# Patient Record
Sex: MALE | Race: WHITE | NOT HISPANIC OR LATINO | Employment: OTHER | URBAN - METROPOLITAN AREA
[De-identification: names, ages, dates, MRNs, and addresses within clinical notes are randomized per-mention and may not be internally consistent; named-entity substitution may affect disease eponyms.]

---

## 2019-08-15 ENCOUNTER — OFFICE VISIT (OUTPATIENT)
Dept: GASTROENTEROLOGY | Facility: CLINIC | Age: 69
End: 2019-08-15
Payer: OTHER GOVERNMENT

## 2019-08-15 ENCOUNTER — PREP FOR PROCEDURE (OUTPATIENT)
Dept: GASTROENTEROLOGY | Facility: CLINIC | Age: 69
End: 2019-08-15

## 2019-08-15 VITALS
SYSTOLIC BLOOD PRESSURE: 132 MMHG | WEIGHT: 219.6 LBS | BODY MASS INDEX: 34.47 KG/M2 | DIASTOLIC BLOOD PRESSURE: 86 MMHG | HEIGHT: 67 IN | HEART RATE: 91 BPM

## 2019-08-15 DIAGNOSIS — K21.9 GASTROESOPHAGEAL REFLUX DISEASE, ESOPHAGITIS PRESENCE NOT SPECIFIED: ICD-10-CM

## 2019-08-15 DIAGNOSIS — R19.5 OCCULT BLOOD IN STOOLS: Primary | ICD-10-CM

## 2019-08-15 PROCEDURE — 99203 OFFICE O/P NEW LOW 30 MIN: CPT | Performed by: PHYSICIAN ASSISTANT

## 2019-08-15 RX ORDER — AMLODIPINE BESYLATE AND BENAZEPRIL HYDROCHLORIDE 2.5; 1 MG/1; MG/1
1 CAPSULE ORAL DAILY
COMMUNITY

## 2019-08-15 RX ORDER — FLUOXETINE HYDROCHLORIDE 20 MG/5ML
LIQUID ORAL DAILY
COMMUNITY

## 2019-08-15 RX ORDER — SIMETHICONE 80 MG
80 TABLET,CHEWABLE ORAL EVERY 6 HOURS PRN
COMMUNITY

## 2019-08-15 RX ORDER — OMEPRAZOLE 40 MG/1
40 CAPSULE, DELAYED RELEASE ORAL DAILY
COMMUNITY

## 2019-08-15 RX ORDER — AMOXICILLIN 500 MG/1
CAPSULE ORAL
Refills: 0 | COMMUNITY
Start: 2019-08-13 | End: 2019-08-27

## 2019-08-15 NOTE — PROGRESS NOTES
[facility] - Outpatient Consultation  Debora Hernandez 71 y o  male MRN: [id number]  Encounter: [id number]          ASSESSMENT AND PLAN:      1  Occult blood in stools  Will plan EGD and colonoscopy for further investigation  He does suffer from acid reflux for which she takes omeprazole 40 mg daily with reasonable relief  He has never had either test in the past   ______________________________________________________________________    HPI:  31-year-old male presents for evaluation of heme-positive stools  He denies any overt rectal bleeding  He denies any abdominal pain, diarrhea, constipation or unexpected weight loss  He has never had an EGD or colonoscopy  He also reports that he suffers from acid reflux for which he takes omeprazole 40 mg daily with some relief in symptoms  He admits that he still occasionally gets heartburn symptoms  REVIEW OF SYSTEMS:    CONSTITUTIONAL: Denies any fever, chills, rigors, and weight loss  HEENT: No earache or tinnitus  Denies hearing loss or visual disturbances  CARDIOVASCULAR: No chest pain or palpitations  RESPIRATORY: Denies any cough, hemoptysis, shortness of breath or dyspnea on exertion  GASTROINTESTINAL: As noted in the History of Present Illness  GENITOURINARY: No problems with urination  Denies any hematuria or dysuria  NEUROLOGIC: No dizziness or vertigo, denies headaches  MUSCULOSKELETAL: Denies any muscle or joint pain  SKIN: Denies skin rashes or itching  ENDOCRINE: Denies excessive thirst  Denies intolerance to heat or cold  PSYCHOSOCIAL: Denies depression or anxiety  Denies any recent memory loss         Historical Information   Past Medical History:   Diagnosis Date    Hypertension      Past Surgical History:   Procedure Laterality Date    KNEE SURGERY       Social History   Social History     Substance and Sexual Activity   Alcohol Use Not Currently     Social History     Substance and Sexual Activity Drug Use Never     Social History     Tobacco Use   Smoking Status Former Smoker   Smokeless Tobacco Never Used     Family History   Problem Relation Age of Onset    Heart disease Mother     Heart disease Father        Meds/Allergies       Current Outpatient Medications:     amLODIPine-benazepril (LOTREL 2 5-10) 2 5-10 MG per capsule    FLUoxetine (PROzac) 20 mg/5 mL solution    omeprazole (PriLOSEC) 40 MG capsule    simethicone (MYLICON) 80 mg chewable tablet    amoxicillin (AMOXIL) 500 mg capsule    No Known Allergies        Objective     Blood pressure 132/86, pulse 91, height 5' 7" (1 702 m), weight 99 6 kg (219 lb 9 6 oz)  Body mass index is 34 39 kg/m²  PHYSICAL EXAM:      General Appearance:   Alert, cooperative, no distress   HEENT:   Normocephalic, atraumatic, anicteric      Neck:  Supple, symmetrical, trachea midline   Lungs:   Clear to auscultation bilaterally; no rales, rhonchi or wheezing; respirations unlabored    Heart[de-identified]   Regular rate and rhythm; no murmur, rub, or gallop  Abdomen:   Soft, non-tender, non-distended; normal bowel sounds; no masses, no organomegaly    Genitalia:   Deferred    Rectal:   Deferred    Extremities:  No cyanosis, clubbing or edema    Pulses:  2+ and symmetric    Skin:  No jaundice, rashes, or lesions    Lymph nodes:  No palpable cervical lymphadenopathy        Lab Results:   No visits with results within 1 Day(s) from this visit  Latest known visit with results is:   No results found for any previous visit  Radiology Results:   No results found

## 2019-08-26 ENCOUNTER — ANESTHESIA EVENT (OUTPATIENT)
Dept: GASTROENTEROLOGY | Facility: HOSPITAL | Age: 69
End: 2019-08-26

## 2019-08-27 ENCOUNTER — ANESTHESIA (OUTPATIENT)
Dept: GASTROENTEROLOGY | Facility: HOSPITAL | Age: 69
End: 2019-08-27

## 2019-08-27 ENCOUNTER — HOSPITAL ENCOUNTER (OUTPATIENT)
Dept: GASTROENTEROLOGY | Facility: HOSPITAL | Age: 69
Setting detail: OUTPATIENT SURGERY
Discharge: HOME | End: 2019-08-27
Attending: INTERNAL MEDICINE | Admitting: INTERNAL MEDICINE
Payer: OTHER GOVERNMENT

## 2019-08-27 VITALS
OXYGEN SATURATION: 94 % | TEMPERATURE: 97.8 F | DIASTOLIC BLOOD PRESSURE: 78 MMHG | HEART RATE: 72 BPM | RESPIRATION RATE: 18 BRPM | SYSTOLIC BLOOD PRESSURE: 116 MMHG

## 2019-08-27 DIAGNOSIS — R19.5 OCCULT BLOOD IN STOOLS: ICD-10-CM

## 2019-08-27 DIAGNOSIS — K21.9 GASTROESOPHAGEAL REFLUX DISEASE, ESOPHAGITIS PRESENCE NOT SPECIFIED: ICD-10-CM

## 2019-08-27 PROCEDURE — 45381 COLONOSCOPY SUBMUCOUS NJX: CPT | Performed by: INTERNAL MEDICINE

## 2019-08-27 PROCEDURE — 45385 COLONOSCOPY W/LESION REMOVAL: CPT | Performed by: INTERNAL MEDICINE

## 2019-08-27 PROCEDURE — 43235 EGD DIAGNOSTIC BRUSH WASH: CPT | Performed by: INTERNAL MEDICINE

## 2019-08-27 PROCEDURE — 88305 TISSUE EXAM BY PATHOLOGIST: CPT | Performed by: PATHOLOGY

## 2019-08-27 PROCEDURE — NC001 PR NO CHARGE: Performed by: INTERNAL MEDICINE

## 2019-08-27 RX ORDER — PROPOFOL 10 MG/ML
INJECTION, EMULSION INTRAVENOUS AS NEEDED
Status: DISCONTINUED | OUTPATIENT
Start: 2019-08-27 | End: 2019-08-27 | Stop reason: SURG

## 2019-08-27 RX ORDER — LIDOCAINE HYDROCHLORIDE 10 MG/ML
INJECTION, SOLUTION INFILTRATION; PERINEURAL AS NEEDED
Status: DISCONTINUED | OUTPATIENT
Start: 2019-08-27 | End: 2019-08-27 | Stop reason: SURG

## 2019-08-27 RX ORDER — SODIUM CHLORIDE, SODIUM LACTATE, POTASSIUM CHLORIDE, CALCIUM CHLORIDE 600; 310; 30; 20 MG/100ML; MG/100ML; MG/100ML; MG/100ML
125 INJECTION, SOLUTION INTRAVENOUS CONTINUOUS
Status: DISCONTINUED | OUTPATIENT
Start: 2019-08-27 | End: 2019-08-31 | Stop reason: HOSPADM

## 2019-08-27 RX ADMIN — PROPOFOL 50 MG: 10 INJECTION, EMULSION INTRAVENOUS at 12:25

## 2019-08-27 RX ADMIN — PROPOFOL 100 MG: 10 INJECTION, EMULSION INTRAVENOUS at 12:16

## 2019-08-27 RX ADMIN — PROPOFOL 25 MG: 10 INJECTION, EMULSION INTRAVENOUS at 12:33

## 2019-08-27 RX ADMIN — PROPOFOL 50 MG: 10 INJECTION, EMULSION INTRAVENOUS at 12:21

## 2019-08-27 RX ADMIN — PROPOFOL 50 MG: 10 INJECTION, EMULSION INTRAVENOUS at 12:19

## 2019-08-27 RX ADMIN — LIDOCAINE HYDROCHLORIDE 70 MG: 10 INJECTION, SOLUTION INFILTRATION; PERINEURAL at 12:16

## 2019-08-27 RX ADMIN — PROPOFOL 50 MG: 10 INJECTION, EMULSION INTRAVENOUS at 12:29

## 2019-08-27 RX ADMIN — SODIUM CHLORIDE, SODIUM LACTATE, POTASSIUM CHLORIDE, AND CALCIUM CHLORIDE 125 ML/HR: .6; .31; .03; .02 INJECTION, SOLUTION INTRAVENOUS at 11:27

## 2019-08-27 NOTE — ANESTHESIA PREPROCEDURE EVALUATION
Review of Systems/Medical History  Patient summary reviewed        Cardiovascular  Hypertension ,    Pulmonary  Negative pulmonary ROS        GI/Hepatic    GERD ,        Negative  ROS        Endo/Other  Negative endo/other ROS      GYN  Negative gynecology ROS          Hematology  Negative hematology ROS      Musculoskeletal  Negative musculoskeletal ROS        Neurology  Negative neurology ROS      Psychology   Psychiatric history,                   Anesthesia Plan  ASA Score- 2     Anesthesia Type- IV sedation with anesthesia with ASA Monitors  Additional Monitors:   Airway Plan:         Plan Factors-    Induction- intravenous  Postoperative Plan-     Informed Consent- Anesthetic plan and risks discussed with patient  I personally reviewed this patient with the CRNA  Discussed and agreed on the Anesthesia Plan with the CRNA  Charlie Calderon

## 2019-08-27 NOTE — ANESTHESIA POSTPROCEDURE EVALUATION
Post-Op Assessment Note    CV Status:  Stable  Pain Score: 0    Pain management: adequate     Mental Status:  Sleepy   Hydration Status:  Euvolemic   PONV Controlled:  Controlled   Airway Patency:  Patent   Post Op Vitals Reviewed: Yes      Staff: CRNA   Comments: vss sv nonobstructed uneventful          BP 97/52 (08/27/19 1238)    Temp 97 8 °F (36 6 °C) (08/27/19 1238)    Pulse 77 (08/27/19 1238)   Resp 18 (08/27/19 1238)    SpO2 96 % (08/27/19 1238)

## 2019-08-27 NOTE — DISCHARGE INSTRUCTIONS

## 2019-08-27 NOTE — H&P
History and Physical -  [facility]  Haidee Sever 71 y o  male MRN: [id number]      HPI: Haidee Sever is a 71y o  year old male who presents for  Hemoccult-positive stool      REVIEW OF SYSTEMS: Per the HPI, and otherwise unremarkable  Historical Information   Past Medical History:   Diagnosis Date    Hypertension      Past Surgical History:   Procedure Laterality Date    KNEE SURGERY       Social History   Social History     Substance and Sexual Activity   Alcohol Use Not Currently     Social History     Substance and Sexual Activity   Drug Use Never     Social History     Tobacco Use   Smoking Status Former Smoker   Smokeless Tobacco Never Used     Family History   Problem Relation Age of Onset    Heart disease Mother     Heart disease Father        Meds/Allergies       (Not in a hospital admission)    No Known Allergies    Objective     Blood pressure 126/86, pulse 83, temperature 97 6 °F (36 4 °C), temperature source Oral, resp  rate 17, SpO2 94 %  PHYSICAL EXAM    Gen: NAD  CV: RRR  CHEST: Clear  ABD: soft, NT/ND  EXT: no edema      ASSESSMENT/PLAN:  This is a 71y o  year old male here for esophagogastroduodenoscopy, colonoscopy, and he is stable and optimized for his procedure

## 2019-09-12 ENCOUNTER — TELEPHONE (OUTPATIENT)
Dept: GASTROENTEROLOGY | Facility: CLINIC | Age: 69
End: 2019-09-12

## 2020-02-21 ENCOUNTER — TRANSCRIBE ORDERS (OUTPATIENT)
Dept: ADMINISTRATIVE | Facility: HOSPITAL | Age: 70
End: 2020-02-21

## 2020-02-21 DIAGNOSIS — G57.31 LESION OF RIGHT LATERAL POPLITEAL NERVE: Primary | ICD-10-CM

## 2020-02-21 DIAGNOSIS — N50.9 LESION OF SCROTUM: Primary | ICD-10-CM

## 2020-02-24 ENCOUNTER — HOSPITAL ENCOUNTER (OUTPATIENT)
Dept: RADIOLOGY | Facility: MEDICAL CENTER | Age: 70
Discharge: HOME | End: 2020-02-24
Payer: COMMERCIAL

## 2020-02-24 DIAGNOSIS — G57.31 LESION OF RIGHT LATERAL POPLITEAL NERVE: ICD-10-CM

## 2020-02-24 PROCEDURE — 76705 ECHO EXAM OF ABDOMEN: CPT

## 2020-03-16 ENCOUNTER — TELEPHONE (OUTPATIENT)
Dept: SURGERY | Facility: CLINIC | Age: 70
End: 2020-03-16

## 2020-03-16 NOTE — TELEPHONE ENCOUNTER
Called pt to see if he had a referral with him to hand in tomorrow- no answer  Left detailed msg for pt to call back re: [place] Referral

## 2020-03-17 ENCOUNTER — CONSULT (OUTPATIENT)
Dept: SURGERY | Facility: CLINIC | Age: 70
End: 2020-03-17
Payer: COMMERCIAL

## 2020-03-17 VITALS
SYSTOLIC BLOOD PRESSURE: 126 MMHG | WEIGHT: 219.8 LBS | DIASTOLIC BLOOD PRESSURE: 82 MMHG | TEMPERATURE: 97.8 F | HEART RATE: 82 BPM | HEIGHT: 67 IN | BODY MASS INDEX: 34.5 KG/M2

## 2020-03-17 DIAGNOSIS — M79.89 SOFT TISSUE MASS: Primary | ICD-10-CM

## 2020-03-17 PROCEDURE — 99203 OFFICE O/P NEW LOW 30 MIN: CPT | Performed by: SURGERY

## 2020-03-17 RX ORDER — SODIUM CHLORIDE, SODIUM LACTATE, POTASSIUM CHLORIDE, CALCIUM CHLORIDE 600; 310; 30; 20 MG/100ML; MG/100ML; MG/100ML; MG/100ML
125 INJECTION, SOLUTION INTRAVENOUS
Status: CANCELLED | OUTPATIENT
Start: 2020-03-17 | End: 2020-03-18

## 2020-03-17 NOTE — PATIENT INSTRUCTIONS
Deep Vein Thrombosis Prevention   WHAT YOU NEED TO KNOW:   Deep vein thrombosis (DVT) is a blood clot that forms in a deep vein of the body  The deep veins in the legs, thighs, and hips are the most common sites for DVT  DVT can also occur in your arms  The clot prevents the normal flow of blood in the vein  The blood backs up and causes pain and swelling  The DVT can break into smaller pieces and travel to your lungs and cause a blockage called a pulmonary embolism  A pulmonary embolism can become life-threatening  DISCHARGE INSTRUCTIONS:   Call 911 for any of the following:   · You feel lightheaded, short of breath, and have chest pain  · You cough up blood  Return to the emergency department if:   · Your arm or leg feels warm, tender, and painful  It may look swollen and red  Contact your healthcare provider if:   · You have questions or concerns about your condition or care  Risk factors for DVT:  A DVT can happen to anybody, but certain things can increase your risk  You may be at higher risk if you have had DVT in the past  You may also be at risk if you have a family member who has had blood clots  The following conditions also increase your risk:  · Limited activity caused by bed rest, a leg cast, or sitting for long periods    · Injury to a deep vein, or surgery    · A blood disorder that makes your blood clot faster than normal, such as factor V Leiden mutation    · Age older than 60 years    · Use of hormone replacement therapy or some types of birth control medicine    · Pregnancy, and for 6 weeks after childbirth     · Cancer or heart failure     · A catheter placed in a large vein    · Smoking    · Obesity or varicose veins  Prevent DVT:   · Guidelines for everyone:      ¨ Maintain a healthy weight  Ask your healthcare provider how much you should weigh  Ask him to help you create a weight loss plan if you are overweight  ¨ Do not smoke    Nicotine and other chemicals in cigarettes and cigars can damage blood vessels and increase your risk for a DVT  Ask your healthcare provider for information if you currently smoke and need help to quit  E-cigarettes or smokeless tobacco still contain nicotine  Talk to your healthcare provider before you use these products  ¨ Move regularly if you sit for long periods of time  If you travel by car or work at a desk, move and stretch in your seat several times each hour  In an airplane, get up and walk every hour  Exercise your legs while sitting by raising and lowering your heels  Keep your toes on the floor while you do this  You can also raise and lower your toes while keeping your heels on the floor  Also tighten and release your leg muscles while sitting  ¨ Exercise regularly  to help increase your blood flow  Walking is a good low-impact exercise  Talk to your healthcare provider about the best exercise plan for you  · Guidelines for people at high risk for DVT:      ¨ Take blood thinner medicines as directed  Your healthcare provider may recommend blood thinners and other medicines to help prevent blood clots  ¨ Wear pressure stockings as directed  The stockings are tight and put pressure on your legs  This improves blood flow and helps prevent clots  Wear the stockings during the day  Do not wear them when you sleep  ¨ Elevate your legs  above the level of your heart as often as you can  This will help decrease swelling and pain  Prop your legs on pillows or blankets to keep them elevated comfortably  ¨ Get up and move as directed after surgery or an injury, or during an illness  Early and regular movement can help decrease your risk for DVT by helping to increase your blood flow  Ask your healthcare provider what type of activity you need and how often you should do it  ¨ Change body positions often if you are bedridden  Ask for help to change your position every 1 to 2 hours    Follow up with your healthcare provider as directed:  Write down your questions so you remember to ask them during your visits  © 2017 [employer] Information is for End User's use only and may not be sold, redistributed or otherwise used for commercial purposes  All illustrations and images included in CareNotes® are the copyrighted property of A [employer]  or [employer]  The above information is an  only  It is not intended as medical advice for individual conditions or treatments  Talk to your doctor, nurse or pharmacist before following any medical regimen to see if it is safe and effective for you

## 2020-03-17 NOTE — PROGRESS NOTES
[address] Garvin 71 y o  male MRN: [id number]  Unit/Bed#:  Encounter: [id number]    Assessment/Plan     Assessment:  Soft tissue mass right buttock  Hypertension  Plan:  I advised the patient to undergo excision of soft tissue mass from the right buttock in an appropriate time due to the corona virus  I discussed the operative procedure, risks, benefits and alternatives with the patient, he understood and agreed to proceed  History of Present Illness     HPI:  Emilia Hobbs is a 71 y o  male who presents to my office for evaluation of soft tissue mass from the right buttock  The patient had had the lump for quite some time, he stated having it for over a year, has increased in size, causing occasional discomfort, no drainage, no skin color changes or any other constitutional symptoms  He went to see his primary care physician an ultrasound was performed, CC report below  He denied having any chills, fever or any other constitutional symptoms  Review of Systems   Constitutional: Negative for chills, fatigue and unexpected weight change  HENT: Negative for nosebleeds and sore throat  Eyes: Negative for pain, discharge and redness  Respiratory: Negative for cough and shortness of breath  Cardiovascular: Negative for chest pain and palpitations  Gastrointestinal: Negative for abdominal pain, blood in stool, constipation, diarrhea and nausea  Endocrine: Negative for cold intolerance and heat intolerance  Genitourinary: Negative for dysuria and hematuria  Neurological: Negative for seizures and headaches  Hematological: Negative for adenopathy  Does not bruise/bleed easily  Psychiatric/Behavioral: Negative for confusion  The patient is not nervous/anxious          Historical Information   Past Medical History:   Diagnosis Date    Hypertension      Past Surgical History:   Procedure Laterality Date    KNEE SURGERY       Social History   Social History     Substance and Sexual Activity   Alcohol Use Not Currently     Social History     Substance and Sexual Activity   Drug Use Never     Social History     Tobacco Use   Smoking Status Former Smoker   Smokeless Tobacco Never Used     Family History: non-contributory    Meds/Allergies   all medications and allergies reviewed     Current Outpatient Medications:     amLODIPine-benazepril (LOTREL 2 5-10) 2 5-10 MG per capsule, Take 1 capsule by mouth daily, Disp: , Rfl:     FLUoxetine (PROzac) 20 mg/5 mL solution, Take by mouth daily, Disp: , Rfl:     omeprazole (PriLOSEC) 40 MG capsule, Take 40 mg by mouth daily, Disp: , Rfl:     simethicone (MYLICON) 80 mg chewable tablet, Chew 80 mg every 6 (six) hours as needed for flatulence, Disp: , Rfl:   No Known Allergies    Objective     Current Vitals:   Blood Pressure: 126/82 (03/17/20 1008)  Pulse: 82 (03/17/20 1008)  Temperature: 97 8 °F (36 6 °C) (03/17/20 1008)  Temp Source: Oral (03/17/20 1008)  Height: 5' 7" (170 2 cm) (03/17/20 1008)  Weight - Scale: 99 7 kg (219 lb 12 8 oz) (03/17/20 1008)      Invasive Devices     None                 Physical Exam   Constitutional: He is oriented to person, place, and time  He appears well-developed and well-nourished  No distress  HENT:   Head: Normocephalic  Mouth/Throat: No oropharyngeal exudate  Cardiovascular: Normal rate and regular rhythm  No murmur heard  Pulmonary/Chest: Effort normal and breath sounds normal  No respiratory distress  Abdominal: Soft  He exhibits no mass  There is no tenderness  Neurological: He is alert and oriented to person, place, and time  No cranial nerve deficit  Skin: No erythema  No pallor  There is a 2 5 x 1 cm soft tissue mass on the right buttock, not adhered to deeper structures, no skin color changes  Psychiatric: He has a normal mood and affect  His behavior is normal    Nursing note and vitals reviewed  Imaging: I have personally reviewed pertinent reports      Procedure: Us Superficial Lump (non Extremity)    Result Date: 2/28/2020  Narrative: SUPERFICIAL SOFT TISSUE ULTRASOUND INDICATION:   G57 31: Lesion of lateral popliteal nerve, right lower limb  Right gluteal lump for last year  The lump has increased in size  COMPARISON:  None TECHNIQUE:   Real-time ultrasound of the right gluteal palpable abnormality was performed with a linear transducer with both volumetric sweeps and still imaging techniques  FINDINGS:  In the superficial subcutaneous soft tissues in the region of palpable abnormality in the right gluteal region there is a 1 3 x 1 1 x 0 8 cm subcutaneous lesion in the superficial soft tissues approximately a half a centimeter to 1 cm deep to the skin surface, having indeterminant sonographic characteristics  There is a predominantly solid lesion with a few small internal cystic spaces  No definite internal vascularity  Impression: Indeterminant 1 3 cm mixed solid and partially cystic mass in the subcutaneous soft tissues in the right gluteal region  Differential diagnosis includes an abnormal appearing lymph node versus perhaps sebaceous cyst   Other mass lesions not excluded  Further clinical assessment recommended  Consider tissue sampling/excision given the history of enlargement   Workstation performed: [id number]     EKG, Pathology, and Other Studies: I have personally reviewed pertinent films in PACS

## 2020-03-17 NOTE — PROGRESS NOTES
Assessment/Plan:    No problem-specific Assessment & Plan notes found for this encounter  Subjective: Consult Abscess on right buttox     Patient ID: Jaun Ignacio is a 71 y o  male  HPI        Review of Systems      Objective: There were no vitals taken for this visit           Physical Exam

## 2020-05-21 ENCOUNTER — TELEPHONE (OUTPATIENT)
Dept: SURGERY | Facility: CLINIC | Age: 70
End: 2020-05-21

## 2020-05-21 DIAGNOSIS — Z01.818 PRE-OPERATIVE CLEARANCE: Primary | ICD-10-CM

## 2020-05-22 ENCOUNTER — ANESTHESIA EVENT (OUTPATIENT)
Dept: PERIOP | Facility: HOSPITAL | Age: 70
End: 2020-05-22
Payer: COMMERCIAL

## 2020-05-22 DIAGNOSIS — Z01.818 PRE-OPERATIVE CLEARANCE: ICD-10-CM

## 2020-05-22 PROCEDURE — U0003 INFECTIOUS AGENT DETECTION BY NUCLEIC ACID (DNA OR RNA); SEVERE ACUTE RESPIRATORY SYNDROME CORONAVIRUS 2 (SARS-COV-2) (CORONAVIRUS DISEASE [COVID-19]), AMPLIFIED PROBE TECHNIQUE, MAKING USE OF HIGH THROUGHPUT TECHNOLOGIES AS DESCRIBED BY CMS-2020-01-R: HCPCS

## 2020-05-22 RX ORDER — ASPIRIN 81 MG/1
81 TABLET, CHEWABLE ORAL DAILY
COMMUNITY
End: 2021-09-15

## 2020-05-22 RX ORDER — ROSUVASTATIN CALCIUM 10 MG/1
5 TABLET, COATED ORAL DAILY
COMMUNITY

## 2020-05-23 LAB — SARS-COV-2 RNA SPEC QL NAA+PROBE: NOT DETECTED

## 2020-05-27 ENCOUNTER — HOSPITAL ENCOUNTER (OUTPATIENT)
Facility: HOSPITAL | Age: 70
Setting detail: OUTPATIENT SURGERY
Discharge: HOME | End: 2020-05-27
Attending: SURGERY | Admitting: SURGERY
Payer: COMMERCIAL

## 2020-05-27 ENCOUNTER — ANESTHESIA (OUTPATIENT)
Dept: PERIOP | Facility: HOSPITAL | Age: 70
End: 2020-05-27
Payer: COMMERCIAL

## 2020-05-27 VITALS
WEIGHT: 220.9 LBS | BODY MASS INDEX: 34.67 KG/M2 | RESPIRATION RATE: 20 BRPM | HEIGHT: 67 IN | SYSTOLIC BLOOD PRESSURE: 120 MMHG | HEART RATE: 64 BPM | OXYGEN SATURATION: 94 % | TEMPERATURE: 97.6 F | DIASTOLIC BLOOD PRESSURE: 75 MMHG

## 2020-05-27 DIAGNOSIS — M79.89 SOFT TISSUE MASS: ICD-10-CM

## 2020-05-27 PROBLEM — L72.3 SEBACEOUS CYST: Chronic | Status: ACTIVE | Noted: 2020-05-27

## 2020-05-27 PROCEDURE — NC001 PR NO CHARGE: Performed by: SURGERY

## 2020-05-27 PROCEDURE — 88304 TISSUE EXAM BY PATHOLOGIST: CPT | Performed by: PATHOLOGY

## 2020-05-27 PROCEDURE — 11402 EXC TR-EXT B9+MARG 1.1-2 CM: CPT | Performed by: SURGERY

## 2020-05-27 PROCEDURE — 11042 DBRDMT SUBQ TIS 1ST 20SQCM/<: CPT | Performed by: CLINICAL NURSE SPECIALIST

## 2020-05-27 RX ORDER — ACETAMINOPHEN 325 MG/1
975 TABLET ORAL EVERY 8 HOURS PRN
Status: DISCONTINUED | OUTPATIENT
Start: 2020-05-27 | End: 2020-05-27 | Stop reason: HOSPADM

## 2020-05-27 RX ORDER — SODIUM CHLORIDE, SODIUM LACTATE, POTASSIUM CHLORIDE, CALCIUM CHLORIDE 600; 310; 30; 20 MG/100ML; MG/100ML; MG/100ML; MG/100ML
50 INJECTION, SOLUTION INTRAVENOUS CONTINUOUS
Status: DISCONTINUED | OUTPATIENT
Start: 2020-05-27 | End: 2020-05-27 | Stop reason: HOSPADM

## 2020-05-27 RX ORDER — CEFAZOLIN SODIUM 2 G/50ML
2000 SOLUTION INTRAVENOUS
Status: COMPLETED | OUTPATIENT
Start: 2020-05-27 | End: 2020-05-27

## 2020-05-27 RX ORDER — PROMETHAZINE HYDROCHLORIDE 25 MG/ML
12.5 INJECTION, SOLUTION INTRAMUSCULAR; INTRAVENOUS ONCE AS NEEDED
Status: CANCELLED | OUTPATIENT
Start: 2020-05-27

## 2020-05-27 RX ORDER — SODIUM CHLORIDE, SODIUM LACTATE, POTASSIUM CHLORIDE, CALCIUM CHLORIDE 600; 310; 30; 20 MG/100ML; MG/100ML; MG/100ML; MG/100ML
125 INJECTION, SOLUTION INTRAVENOUS
Status: COMPLETED | OUTPATIENT
Start: 2020-05-27 | End: 2020-05-27

## 2020-05-27 RX ORDER — HYDROMORPHONE HCL/PF 1 MG/ML
0.4 SYRINGE (ML) INJECTION
Status: CANCELLED | OUTPATIENT
Start: 2020-05-27

## 2020-05-27 RX ORDER — FENTANYL CITRATE/PF 50 MCG/ML
50 SYRINGE (ML) INJECTION
Status: CANCELLED | OUTPATIENT
Start: 2020-05-27

## 2020-05-27 RX ORDER — KETAMINE HYDROCHLORIDE 50 MG/ML
INJECTION, SOLUTION, CONCENTRATE INTRAMUSCULAR; INTRAVENOUS AS NEEDED
Status: DISCONTINUED | OUTPATIENT
Start: 2020-05-27 | End: 2020-05-27 | Stop reason: SURG

## 2020-05-27 RX ORDER — BUPIVACAINE HYDROCHLORIDE 2.5 MG/ML
INJECTION, SOLUTION EPIDURAL; INFILTRATION; INTRACAUDAL AS NEEDED
Status: DISCONTINUED | OUTPATIENT
Start: 2020-05-27 | End: 2020-05-27 | Stop reason: HOSPADM

## 2020-05-27 RX ORDER — ALBUTEROL SULFATE 2.5 MG/3ML
2.5 SOLUTION RESPIRATORY (INHALATION) ONCE AS NEEDED
Status: CANCELLED | OUTPATIENT
Start: 2020-05-27

## 2020-05-27 RX ORDER — PROPOFOL 10 MG/ML
INJECTION, EMULSION INTRAVENOUS CONTINUOUS PRN
Status: DISCONTINUED | OUTPATIENT
Start: 2020-05-27 | End: 2020-05-27 | Stop reason: SURG

## 2020-05-27 RX ORDER — ONDANSETRON 2 MG/ML
4 INJECTION INTRAMUSCULAR; INTRAVENOUS ONCE AS NEEDED
Status: CANCELLED | OUTPATIENT
Start: 2020-05-27

## 2020-05-27 RX ORDER — MIDAZOLAM HYDROCHLORIDE 2 MG/2ML
INJECTION, SOLUTION INTRAMUSCULAR; INTRAVENOUS AS NEEDED
Status: DISCONTINUED | OUTPATIENT
Start: 2020-05-27 | End: 2020-05-27 | Stop reason: SURG

## 2020-05-27 RX ORDER — METOCLOPRAMIDE HYDROCHLORIDE 5 MG/ML
10 INJECTION INTRAMUSCULAR; INTRAVENOUS ONCE AS NEEDED
Status: CANCELLED | OUTPATIENT
Start: 2020-05-27

## 2020-05-27 RX ORDER — PROPOFOL 10 MG/ML
INJECTION, EMULSION INTRAVENOUS AS NEEDED
Status: DISCONTINUED | OUTPATIENT
Start: 2020-05-27 | End: 2020-05-27 | Stop reason: SURG

## 2020-05-27 RX ADMIN — CEFAZOLIN SODIUM 2000 MG: 2 SOLUTION INTRAVENOUS at 10:52

## 2020-05-27 RX ADMIN — KETAMINE HYDROCHLORIDE 20 MG: 50 INJECTION INTRAMUSCULAR; INTRAVENOUS at 11:04

## 2020-05-27 RX ADMIN — SODIUM CHLORIDE, SODIUM LACTATE, POTASSIUM CHLORIDE, AND CALCIUM CHLORIDE: .6; .31; .03; .02 INJECTION, SOLUTION INTRAVENOUS at 10:52

## 2020-05-27 RX ADMIN — KETAMINE HYDROCHLORIDE 20 MG: 50 INJECTION INTRAMUSCULAR; INTRAVENOUS at 10:58

## 2020-05-27 RX ADMIN — MIDAZOLAM HYDROCHLORIDE 2 MG: 1 INJECTION, SOLUTION INTRAMUSCULAR; INTRAVENOUS at 10:58

## 2020-05-27 RX ADMIN — PROPOFOL 40 MG: 10 INJECTION, EMULSION INTRAVENOUS at 10:58

## 2020-05-27 RX ADMIN — PROPOFOL 80 MCG/KG/MIN: 10 INJECTION, EMULSION INTRAVENOUS at 10:58

## 2020-06-09 ENCOUNTER — OFFICE VISIT (OUTPATIENT)
Dept: SURGERY | Facility: CLINIC | Age: 70
End: 2020-06-09

## 2020-06-09 VITALS
TEMPERATURE: 97.9 F | HEART RATE: 69 BPM | HEIGHT: 67 IN | BODY MASS INDEX: 34.97 KG/M2 | SYSTOLIC BLOOD PRESSURE: 128 MMHG | DIASTOLIC BLOOD PRESSURE: 72 MMHG | WEIGHT: 222.8 LBS

## 2020-06-09 DIAGNOSIS — Z48.89 POSTOPERATIVE VISIT: Primary | ICD-10-CM

## 2020-06-09 PROCEDURE — 99024 POSTOP FOLLOW-UP VISIT: CPT | Performed by: SURGERY

## 2021-05-21 ENCOUNTER — TELEPHONE (OUTPATIENT)
Dept: DERMATOLOGY | Facility: CLINIC | Age: 71
End: 2021-05-21

## 2021-05-21 NOTE — TELEPHONE ENCOUNTER
Referral received from the [facility]   Pt scheduled with Dr Beverly Valverde on 6/14/21 @ 12:50PM

## 2021-06-14 ENCOUNTER — CONSULT (OUTPATIENT)
Dept: DERMATOLOGY | Facility: CLINIC | Age: 71
End: 2021-06-14
Payer: COMMERCIAL

## 2021-06-14 VITALS — TEMPERATURE: 97.3 F | HEIGHT: 67 IN | WEIGHT: 227 LBS | BODY MASS INDEX: 35.63 KG/M2

## 2021-06-14 DIAGNOSIS — L72.0 EPIDERMAL INCLUSION CYST: Primary | ICD-10-CM

## 2021-06-14 PROCEDURE — 99203 OFFICE O/P NEW LOW 30 MIN: CPT | Performed by: DERMATOLOGY

## 2021-06-14 NOTE — PROGRESS NOTES
[facility] Note     Patient Name: Felipa Lewis  Encounter Date: 6/14/2021     Have you been cared for by a [facility] Dermatologist in the last 3 years and, if so, which one? No    · Have you traveled outside of the [address] [place] [address] in the past 3 months or outside of the [facility] area in the last 2 weeks? No     May we call your Preferred Phone number to discuss your specific medical information? Yes     May we leave a detailed message that includes your specific medical information? Yes      Today's Chief Concerns:   Concern #1:  Spot of concern   Concern #2:      Past Medical History:  Have you personally ever had or currently have any of the following? · Skin cancer (such as Melanoma, Basal Cell Carcinoma, Squamous Cell Carcinoma? (If Yes, please provide more detail)- No  · Eczema: No  · Psoriasis: No  · HIV/AIDS: No  · Hepatitis B or C: No  · Tuberculosis: No  · Systemic Immunosuppression such as Diabetes, Biologic or Immunotherapy, Chemotherapy, Organ Transplantation, Bone Marrow Transplantation (If YES, please provide more detail): No  · Radiation Treatment (If YES, please provide more detail): No  · Any other major medical conditions/concerns? (If Yes, which types)- YES, PTSD    Social History:     What is/was your primary occupation? Retired     What are your hobbies/past-times? Family History:  Have any of your "first degree relatives" (parent, brother, sister, or child) had any of the following       · Skin cancer such as Melanoma or Merkel Cell Carcinoma or Pancreatic Cancer? No  · Eczema, Asthma, Hay Fever or Seasonal Allergies: No  · Psoriasis or Psoriatic Arthritis: No  · Do any other medical conditions seem to run in your family? If Yes, what condition and which relatives?   YES, History of heart and repository problems    Current Medications:   (please update all dermatological medications before printing patient's AVS!)      Current Outpatient Medications:     amLODIPine-benazepril (LOTREL 2 5-10) 2 5-10 MG per capsule, Take 1 capsule by mouth daily, Disp: , Rfl:     aspirin 81 mg chewable tablet, Chew 81 mg daily, Disp: , Rfl:     FLUoxetine (PROzac) 20 mg/5 mL solution, Take by mouth daily, Disp: , Rfl:     omeprazole (PriLOSEC) 40 MG capsule, Take 40 mg by mouth daily, Disp: , Rfl:     rosuvastatin (CRESTOR) 10 MG tablet, Take 5 mg by mouth daily, Disp: , Rfl:     simethicone (MYLICON) 80 mg chewable tablet, Chew 80 mg every 6 (six) hours as needed for flatulence, Disp: , Rfl:       Review of Systems:  Have you recently had or currently have any of the following? If YES, what are you doing for the problem? · Fever, chills or unintended weight loss: No  · Sudden loss or change in your vision: No  · Nausea, vomiting or blood in your stool: No  · Painful or swollen joints: No  · Wheezing or cough: No  · Changing mole or non-healing wound: YES, Right clavicle  · Nosebleeds: No  · Excessive sweating: No  · Easy or prolonged bleeding? YES, 81 mg ASA  · Over the last 2 weeks, how often have you been bothered by the following problems? · Taking little interest or pleasure in doing things: 2 - More than half the days  On medication  · Feeling down, depressed, or hopeless: 2 - More than half the days  On medication  · Rapid heartbeat with epinephrine:  No    · FEMALES ONLY:    · Are you pregnant or planning to become pregnant? N/A  · Are you currently or planning to be nursing or breast feeding? N/A    · Any known allergies? · No Known Allergies      Physical Exam:     Was a chaperone (Derm Clinical Assistant) present throughout the entire Physical Exam? Yes     Did the Dermatology Team specifically  the patient on the importance of a Full Skin Exam to be sure that nothing is missed clinically?  Yes}  o Did the patient ultimately request or accept a Full Skin Exam?  NO  o Did the patient specifically refuse to have the areas "under-the-bra" examined by the Dermatologist? No  o Did the patient specifically refuse to have the areas "under-the-underwear" examined by the Dermatologist? No    CONSTITUTIONAL:   Vitals:    06/14/21 1242   Temp: (!) 97 3 °F (36 3 °C)   Weight: 103 kg (227 lb)   Height: 5' 7" (1 702 m)         PSYCH: Normal mood and affect  EYES: Normal conjunctiva  ENT: Normal lips and oral mucosa  CARDIOVASCULAR: No edema  RESPIRATORY: Normal respirations  HEME/LYMPH/IMMUNO:  No regional lymphadenopathy except as noted below in "ASSESSMENT AND PLAN BY DIAGNOSIS"    SKIN:  FULL ORGAN SYSTEM EXAM  Hair, Scalp, Ears, Face Normal except as noted below in Assessment   Neck, Cervical Chain Nodes Normal except as noted below in Assessment   Right Arm/Hand/Fingers Normal except as noted below in Assessment   Left Arm/Hand/Fingers Normal except as noted below in Assessment   Chest/Breasts/Axillae Viewed areas Normal except as noted below in Assessment   Abdomen, Umbilicus Normal except as noted below in Assessment   Back/Spine Normal except as noted below in Assessment   Groin/Genitalia/Buttocks NOT EXAMINED   Right Leg, Foot, Toes    Left Leg, Foot, Toes         Assessment and Plan by Diagnosis:    History of Present Condition:     Duration:  How long has this been an issue for you? o  6 months   Location Affected:  Where on the body is this affecting you?    o  right clavicle   Quality:  Is there any bleeding, pain, itch, burning/irritation, or redness associated with the skin lesion?    o  Changes color   Severity:  Describe any bleeding, pain, itch, burning/irritation, or redness on a scale of 1 to 10 (with 10 being the worst)    o  N/A   Timing:  Does this condition seem to be there pretty constantly or do you notice it more at specific times throughout the day?    o  Constantly   Context:  Have you ever noticed that this condition seems to be associated with specific activities you do?    o  No   Modifying Factors: o Anything that seems to make the condition worse?    -  Picking at it  o What have you tried to do to make the condition better?    -  No   Associated Signs and Symptoms:  Does this skin lesion seem to be associated with any of the following:  o  changes color    1  EPIDERMAL INCLUSION CYST    Physical Exam:   Anatomic Location Affected:  Right clavicle   Morphological Description:  Subcutaneous nodule with punctum and overlaying brown pigment    Pertinent Positives:   Pertinent Negatives: Additional History of Present Condition:  Present for 6 months  Does not bother patient, no pain  Patient does pick at and it will get irritated and some white matter will come out  Assessment and Plan:  Based on a thorough discussion of this condition and the management approach to it (including a comprehensive discussion of the known risks, side effects and potential benefits of treatment), the patient (family) agrees to implement the following specific plan:   Monitor for any changes   Benign, assurance provided  What are epidermal inclusion cysts? Epidermal inclusion cysts are the most common, benign cutaneous cysts  There are many different names for epidermal inclusion cysts, including epidermoid cyst, epidermal cyst, infundibular cyst, inclusion cyst, and keratin cyst  These cysts can occur anywhere on the body and typically present as nodules directly underneath the skin  There is often a visible pore or opening in the center  The cysts are freely moveable and can range from a few millimeters to several centimeters in diameter  The center of epidermoid cysts almost always contains keratin, which has a cheesy appearance, and not sebum  They also do not originate from sebaceous glands  Therefore, epidermal inclusion cysts are not the same as sebaceous cysts  Cysts may remain stable or progressively enlarge over time   There are no reliable predictive factors to tell if an epidermal inclusion cyst will enlarge, become inflamed, or remain quiescent  Infected cysts tend to become larger, turn red, and are more noticeable to the patient  There may be accompanying pain and discomfort  What causes epidermal inclusion cysts? Epidermal inclusion cysts often appear out of the blue and are not contagious  They are due to a proliferation of epidermal cells within the dermis and are more common in men than women  They occur more frequently in patients in their 20s to 45s  Epidermal inclusion cysts by themselves are usually not inherited, but they can be hereditary in rare syndromes such as Muñoz syndrome, nodular elastosis with cysts and comedones (Favre-Racouchot syndrome), and basal cell nevus syndrome (Gorlin syndrome)  Elderly patients with chronic sun-damaged skin areas have a higher likelihood of developing epidermoid cysts  They often occur in areas where hair follicles have been inflamed or repeatedly irritated are more frequent in patients with acne vulgaris  In the  period, they are called milia  Patients on BRAF inhibitors such as imiquimod and cyclosporine have a higher incidence of epidermoid cysts of the face  How do we diagnose an epidermal inclusion cyst?  Epidermoid inclusion cysts are often diagnosed by history and physical exam  There is usually no need for biopsy prior to removal   Radiographic and laboratory exams, such as ultrasound studies, are unnecessary and not typically ordered unless the practitioner suspects a genetic condition  What is the treatment for an epidermal inclusion cyst?  Inflamed, uninfected epidermal inclusion cysts rarely resolve spontaneously without therapy or surgical intervention  Treatment is not emergent unless desired by the patient  Definitive treatment is via surgical excision with walls intact  This method will prevent recurrence  This is best done when the cyst is not inflamed, to decrease the probability of rupture during surgery     - A local anesthetic will be injected around the cyst  - A small incision is made in the skin overlying the cyst, and contents are expressed  - The incision is repaired with sutures    Another option is to use a 4mm punch biopsy with cyst extraction through the defect  Incision and drainage is often needed if the cyst is infected or inflamed  If there is surrounding cellulitis, oral antibiotic therapy may be necessary  The common agents used target methicillin sensitive Staphylococcal aureus and methicillin resistant S aureus in areas of high prevalence               Scribe Attestation    I,:  Anderson Beckham am acting as a scribe while in the presence of the attending physician :       I,:  Edy Andrade MD personally performed the services described in this documentation    as scribed in my presence :

## 2021-06-14 NOTE — PATIENT INSTRUCTIONS
EPIDERMAL INCLUSION CYST      Assessment and Plan:  Based on a thorough discussion of this condition and the management approach to it (including a comprehensive discussion of the known risks, side effects and potential benefits of treatment), the patient (family) agrees to implement the following specific plan:   Monitor for any changes   Benign, assurance provided  What are epidermal inclusion cysts? Epidermal inclusion cysts are the most common, benign cutaneous cysts  There are many different names for epidermal inclusion cysts, including epidermoid cyst, epidermal cyst, infundibular cyst, inclusion cyst, and keratin cyst  These cysts can occur anywhere on the body and typically present as nodules directly underneath the skin  There is often a visible pore or opening in the center  The cysts are freely moveable and can range from a few millimeters to several centimeters in diameter  The center of epidermoid cysts almost always contains keratin, which has a cheesy appearance, and not sebum  They also do not originate from sebaceous glands  Therefore, epidermal inclusion cysts are not the same as sebaceous cysts  Cysts may remain stable or progressively enlarge over time  There are no reliable predictive factors to tell if an epidermal inclusion cyst will enlarge, become inflamed, or remain quiescent  Infected cysts tend to become larger, turn red, and are more noticeable to the patient  There may be accompanying pain and discomfort  What causes epidermal inclusion cysts? Epidermal inclusion cysts often appear out of the blue and are not contagious  They are due to a proliferation of epidermal cells within the dermis and are more common in men than women  They occur more frequently in patients in their 20s to 45s   Epidermal inclusion cysts by themselves are usually not inherited, but they can be hereditary in rare syndromes such as Muñoz syndrome, nodular elastosis with cysts and comedones (Favre-Racouchot syndrome), and basal cell nevus syndrome (Gorlin syndrome)  Elderly patients with chronic sun-damaged skin areas have a higher likelihood of developing epidermoid cysts  They often occur in areas where hair follicles have been inflamed or repeatedly irritated are more frequent in patients with acne vulgaris  In the  period, they are called milia  Patients on BRAF inhibitors such as imiquimod and cyclosporine have a higher incidence of epidermoid cysts of the face  How do we diagnose an epidermal inclusion cyst?  Epidermoid inclusion cysts are often diagnosed by history and physical exam  There is usually no need for biopsy prior to removal   Radiographic and laboratory exams, such as ultrasound studies, are unnecessary and not typically ordered unless the practitioner suspects a genetic condition  What is the treatment for an epidermal inclusion cyst?  Inflamed, uninfected epidermal inclusion cysts rarely resolve spontaneously without therapy or surgical intervention  Treatment is not emergent unless desired by the patient  Definitive treatment is via surgical excision with walls intact  This method will prevent recurrence  This is best done when the cyst is not inflamed, to decrease the probability of rupture during surgery  - A local anesthetic will be injected around the cyst  - A small incision is made in the skin overlying the cyst, and contents are expressed  - The incision is repaired with sutures    Another option is to use a 4mm punch biopsy with cyst extraction through the defect  Incision and drainage is often needed if the cyst is infected or inflamed  If there is surrounding cellulitis, oral antibiotic therapy may be necessary  The common agents used target methicillin sensitive Staphylococcal aureus and methicillin resistant S aureus in areas of high prevalence

## 2021-09-15 ENCOUNTER — OFFICE VISIT (OUTPATIENT)
Dept: DERMATOLOGY | Facility: CLINIC | Age: 71
End: 2021-09-15
Payer: COMMERCIAL

## 2021-09-15 VITALS — BODY MASS INDEX: 34.37 KG/M2 | HEIGHT: 67 IN | WEIGHT: 219 LBS | TEMPERATURE: 97.3 F

## 2021-09-15 DIAGNOSIS — L70.9 ACNE, UNSPECIFIED ACNE TYPE: ICD-10-CM

## 2021-09-15 DIAGNOSIS — L82.1 SEBORRHEIC KERATOSIS: Primary | ICD-10-CM

## 2021-09-15 PROCEDURE — 11900 INJECT SKIN LESIONS </W 7: CPT | Performed by: DERMATOLOGY

## 2021-09-15 PROCEDURE — 99214 OFFICE O/P EST MOD 30 MIN: CPT | Performed by: DERMATOLOGY

## 2021-09-15 RX ORDER — CLINDAMYCIN PHOSPHATE 11.9 MG/ML
SOLUTION TOPICAL 2 TIMES DAILY
Qty: 30 ML | Refills: 2 | Status: SHIPPED | OUTPATIENT
Start: 2021-09-15

## 2021-09-20 DIAGNOSIS — L70.9 ACNE, UNSPECIFIED ACNE TYPE: ICD-10-CM

## 2021-09-20 NOTE — TELEPHONE ENCOUNTER
Patient called again asking if he could have his prescription sent to [address] in [facility] instead of [facility]

## 2021-09-20 NOTE — TELEPHONE ENCOUNTER
Patient called stating that his prescription from his appointment on 9/15 was not sent to pharmacy  Called pharmacy and they confirmed it was not sent  Prescription was faxed 9/20 to [address] [place] [address] in [facility]

## 2021-09-21 NOTE — TELEPHONE ENCOUNTER
Please call and let him know that we faxed to the [facility] last week as he requested  Was not sent to [facility]

## 2021-09-22 RX ORDER — CLINDAMYCIN PHOSPHATE 11.9 MG/ML
SOLUTION TOPICAL 2 TIMES DAILY
Qty: 30 ML | Refills: 2 | OUTPATIENT
Start: 2021-09-22

## 2023-08-17 ENCOUNTER — TELEPHONE (OUTPATIENT)
Dept: DERMATOLOGY | Facility: CLINIC | Age: 73
End: 2023-08-17

## 2023-08-17 NOTE — TELEPHONE ENCOUNTER
Received referral from the [place]. Called and spoke with pt. Pt is scheduled 8/25 with Dr. Luci Gordon.  Thanks!!

## 2023-08-17 NOTE — TELEPHONE ENCOUNTER
Patient is an est pt. Sees Dr Julio Mendez. [facility] said [facility], I dont see it in the chart or under media. Would you be able to reach out to the [place] for referral.  Pt stated he could "die" or he will cut it out himself. I advised pt against this.

## 2023-08-25 ENCOUNTER — OFFICE VISIT (OUTPATIENT)
Dept: DERMATOLOGY | Facility: CLINIC | Age: 73
End: 2023-08-25
Payer: COMMERCIAL

## 2023-08-25 VITALS — TEMPERATURE: 97.8 F | BODY MASS INDEX: 34.53 KG/M2 | HEIGHT: 67 IN | WEIGHT: 220 LBS

## 2023-08-25 DIAGNOSIS — L71.9 ROSACEA: ICD-10-CM

## 2023-08-25 DIAGNOSIS — L82.1 SEBORRHEIC KERATOSIS: ICD-10-CM

## 2023-08-25 DIAGNOSIS — D18.01 CHERRY ANGIOMA: ICD-10-CM

## 2023-08-25 DIAGNOSIS — L29.9 PRURITUS: ICD-10-CM

## 2023-08-25 DIAGNOSIS — D48.5 NEOPLASM OF UNCERTAIN BEHAVIOR OF SKIN: ICD-10-CM

## 2023-08-25 DIAGNOSIS — L28.1 PRURIGO NODULARIS: ICD-10-CM

## 2023-08-25 DIAGNOSIS — D22.9 NEVUS: Primary | ICD-10-CM

## 2023-08-25 PROCEDURE — 99214 OFFICE O/P EST MOD 30 MIN: CPT | Performed by: DERMATOLOGY

## 2023-08-25 PROCEDURE — 11102 TANGNTL BX SKIN SINGLE LES: CPT | Performed by: DERMATOLOGY

## 2023-08-25 PROCEDURE — 88305 TISSUE EXAM BY PATHOLOGIST: CPT | Performed by: PATHOLOGY

## 2023-08-25 RX ORDER — TRIAMCINOLONE ACETONIDE 1 MG/G
CREAM TOPICAL
Qty: 80 G | Refills: 1 | Status: SHIPPED | OUTPATIENT
Start: 2023-08-25

## 2023-08-25 RX ORDER — METRONIDAZOLE 7.5 MG/G
GEL TOPICAL 2 TIMES DAILY
Qty: 45 G | Refills: 2 | Status: SHIPPED | OUTPATIENT
Start: 2023-08-25

## 2023-08-25 RX ORDER — METRONIDAZOLE 7.5 MG/G
GEL TOPICAL 2 TIMES DAILY
Qty: 45 G | Refills: 2 | Status: SHIPPED | OUTPATIENT
Start: 2023-08-25 | End: 2023-08-25

## 2023-08-25 NOTE — PATIENT INSTRUCTIONS
MELANOCYTIC NEVI ("Moles")      Assessment and Plan:  Based on a thorough discussion of this condition and the management approach to it (including a comprehensive discussion of the known risks, side effects and potential benefits of treatment), the patient (family) agrees to implement the following specific plan:  Provided handout with information regarding the ABCDE's of moles   Recommend routine skin exams every year   Sun avoidance, protective clothing (known as UPF clothing), and the use of at least SPF 30 sunscreens is advised. Sunscreen should be reapplied every two hours when outside. SEBORRHEIC KERATOSIS; NON-INFLAMED    Assessment and Plan:  Based on a thorough discussion of this condition and the management approach to it (including a comprehensive discussion of the known risks, side effects and potential benefits of treatment), the patient (family) agrees to implement the following specific plan:  Reassured benign        ANGIOMA ("SCHREIBER ANGIOMA")    . Assessment and Plan:  Reassured benign    NEOPLASM OF UNCERTAIN BEHAVIOR OF SKIN    Assessment and Plan:  I have discussed with the patient that a sample of skin via a "skin biopsy” would be potentially helpful to further make a specific diagnosis under the microscope. Based on a thorough discussion of this condition and the management approach to it (including a comprehensive discussion of the known risks, side effects and potential benefits of treatment), the patient (family) agrees to implement the following specific plan:    Procedure:  Skin Biopsy. After a thorough discussion of treatment options and risk/benefits/alternatives (including but not limited to local pain, scarring, dyspigmentation, blistering, possible superinfection, and inability to confirm a diagnosis via histopathology), verbal and written consent were obtained and portion of the rash was biopsied for tissue sample.   See below for consent that was obtained from patient and subsequent Procedure Note. PROCEDURE TANGENTIAL (SHAVE) BIOPSY NOTE:        After obtaining informed consent  at which time there was a discussion about the purpose of biopsy  and low risks of infection and bleeding. The area was prepped and draped in the usual fashion. Anesthesia was obtained with 1% lidocaine with epinephrine. A shave biopsy to an appropriate sampling depth was obtained by Shave (Dermablade or 15 blade) The resulting wound was covered with surgical ointment and bandaged appropriately. The patient tolerated the procedure well without complications and was without signs of functional compromise. Specimen has been sent for review by Dermatopathology. Standard post-procedure care has been explained and has been included in written form within the patient's copy of Informed Consent. INFORMED CONSENT DISCUSSION AND POST-OPERATIVE INSTRUCTIONS FOR PATIENT    I.  RATIONALE FOR PROCEDURE  I understand that a skin biopsy allows the Dermatologist to test a lesion or rash under the microscope to obtain a diagnosis. It usually involves numbing the area with numbing medication and removing a small piece of skin; sometimes the area will be closed with sutures. In this specific procedure, sutures are not usually needed. If any sutures are placed, then they are usually need to be removed in 2 weeks or less. I understand that my Dermatologist recommends that a skin "shave" biopsy be performed today. A local anesthetic, similar to the kind that a dentist uses when filling a cavity, will be injected with a very small needle into the skin area to be sampled. The injected skin and tissue underneath "will go to sleep” and become numb so no pain should be felt afterwards.   An instrument shaped like a tiny "razor blade" (shave biopsy instrument) will be used to cut a small piece of tissue and skin from the area so that a sample of tissue can be taken and examined more closely under the microscope. A slight amount of bleeding will occur, but it will be stopped with direct pressure and a pressure bandage and any other appropriate methods. I understands that a scar will form where the wound was created. Surgical ointment will be applied to help protect the wound. Sutures are not usually needed. II.  RISKS AND POTENTIAL COMPLICATIONS   I understand the risks and potential complications of a skin biopsy include but are not limited to the following:  Bleeding  Infection  Pain  Scar/keloid  Skin discoloration  Incomplete Removal  Recurrence  Nerve Damage/Numbness/Loss of Function  Allergic Reaction to Anesthesia  Biopsies are diagnostic procedures and based on findings additional treatment or evaluation may be required  Loss or destruction of specimen resulting in no additional findings    My Dermatologist has explained to me the nature of the condition, the nature of the procedure, and the benefits to be reasonably expected compared with alternative approaches. My Dermatologist has discussed the likelihood of major risks or complications of this procedure including the specific risks listed above, such as bleeding, infection, and scarring/keloid. I understand that a scar is expected after this procedure. I understand that my physician cannot predict if the scar will form a "keloid," which extends beyond the borders of the wound that is created. A keloid is a thick, painful, and bumpy scar. A keloid can be difficult to treat, as it does not always respond well to therapy, which includes injecting cortisone directly into the keloid every few weeks. While this usually reduces the pain and size of the scar, it does not eliminate it. I understand that photographs may be taken before and after the procedure. These will be maintained as part of the medical providers confidential records and may not be made available to me.   I further authorize the medical provider to use the photographs for teaching purposes or to illustrate scientific papers, books, or lectures if in his/her judgment, medical research, education, or science may benefit from its use. I have had an opportunity to fully inquire about the risks and benefits of this procedure and its alternatives. I have been given ample time and opportunity to ask questions and to seek a second opinion if I wished to do so. I acknowledge that there have specifically been no guarantees as to the cosmetic results from the procedure. I am aware that with any procedure there is always the possibility of an unexpected complication. III. POST-PROCEDURAL CARE (WHAT YOU WILL NEED TO DO "AFTER THE BIOPSY" TO OPTIMIZE HEALING)    Keep the area clean and dry. Try NOT to remove the bandage or get it wet for the first 24 hours. Gently clean the area and apply surgical ointment (such as Vaseline petrolatum ointment, which is available "over the counter" and not a prescription) to the biopsy site for up to 2 weeks straight. This acts to protect the wound from the outside world. Sutures are not usually placed in this procedure. If any sutures were placed, return for suture removal as instructed (generally 1 week for the face, 2 weeks for the body). Take Acetaminophen (Tylenol) for discomfort, if no contraindications. Ibuprofen or aspirin could make bleeding worse. Call our office immediately for signs of infection: fever, chills, increased redness, warmth, tenderness, discomfort/pain, or pus or foul smell coming from the wound. WHAT TO DO IF THERE IS ANY BLEEDING? If a small amount of bleeding is noticed, place a clean cloth over the area and apply firm pressure for ten minutes. Check the wound after 10 minutes of direct pressure. If bleeding persists, try one more time for an additional 10 minutes of direct pressure on the area.   If the bleeding becomes heavier or does not stop after the second attempt, or if you have any other questions about this procedure, then please call your [facility] by calling [phone] (SKIN). I hereby acknowledge that I have reviewed and verified the site with my Dermatologist and have requested and authorized my Dermatologist to proceed with the procedure. ROSACEA      Assessment and Plan:  Based on a thorough discussion of this condition and the management approach to it (including a comprehensive discussion of the known risks, side effects and potential benefits of treatment), the patient (family) agrees to implement the following specific plan:      Rosacea is a chronic rash affecting the mid-face including the nose, cheeks, chin, forehead, and eyelids. The incidence is usually greatest between the ages of 30-60 years and is more common in people with fair skin. Common characteristics include redness, telangiectasias, papules and pustules over affected areas. Rosacea may look similar to acne, but there is a lack of comedones. Occasionally the eyes may also be involved in ocular rosacea. In advanced disease, enlargement of the sebaceous glands in the nose, termed rhinophyma, may be present. Rosacea results in red spots (papules) and sometimes pustules over the face, but unlike acne there are no blackheads, whiteheads, or cystic nodules. Patients often experience increased facial flushing with prominent blood vessels (erythematotelangiectatic rosacea) and dry, sensitive skin. These symptoms are exacerbated by sun exposure, hot or spicy foods, topical steroids and oil-based facial products. In ocular rosacea, eyelids may be red and sore due to conjunctivitis, keratitis, and episcleritis. If rhinophyma develops due to enlargement of sebaceous glands, the patient may have an enlarged and irregularly shaped nose with prominent pores.  In rosacea that is refractory to treatment, patients can develop persistent redness and swelling of the face due to lymphatic obstruction Pat Bishop disease). Distribution around the cheeks may be confused with the malar or “butterfly rash” of lupus. However, the rash of lupus spares the nasal creases and lacks papules and pustules. If signs of photosensitivity, oral ulcers, arthritis, and kidney dysfunction are present then consider referral to a rheumatologist.     There are many potential causes of rosacea including genetic, environmental, vascular, and inflammatory factors.  These include, but are not limited to:  Chronic exposure to ultraviolet radiation   Increased immune responses in the form of cathelicidins that promote vessel dilation and infiltration with white blood cells (neutrophils) into the dermis  Increased matrix metalloproteinases such as collagen and elastase that remodel normal tissue may contribute to inflammation of the skin making it thicker and harder  There is some evidence to suggest that increased numbers of demodex mites on patient skin may contribute to rosacea papules     General Treatment Approach   Avoid exacerbating factors such as heat, spicy foods, and alcohol   Use daily SPF30+ sunscreen and other methods of coverage for sun protection  Use water-based make-up   Avoid applying topical steroids to affected areas as they can cause perioral dermatitis and exacerbate rosacea     Topical Treatment Approach  Metronidazole cream or gel by itself or in combination with oral antibiotics for more severe cases  Azelaic acid cream or lotion is effective for mild inflammatory rosacea when applied twice daily to affected areas  Brimonidine gel and oxymetazoline hydrochloride cream can reduce facial redness temporarily   Ivermectin cream can treat papulopustular rosacea by controlling demodex mites and inflammation   Pimecrolimus cream or tacrolimus ointment twice a day for 2-3 months can help reduce inflammation    Oral Treatment Approach  Antibiotics such as doxycycline, minocycline, or erythromycin for 1-3 months  Clonidine and carvedilol can help reduce facial flushing and are generally well tolerated. Common side effects include low blood pressure, gastrointestinal upset, dry eyes, blurred vision and low heart rate. Isotretinoin at low doses can be effective for long term treatment when antibiotics fail. Side effects may make it unsuitable for some patients. NSAIDs such as diclofenac can help reduce discomfort and redness in the skin. Procedural/Surgical Treatment Approach   Vascular lasers or intense pulsed light treatment may be used to treat persistent telangiectasia and papulopustular rosacea  Plastic surgery and carbon dioxide lasers may be used to treat rhinophyma     PRURITUS            What is pruritus? Pruritus is the medical term for itch. Itch is an unpleasant sensation on the skin that provokes the desire to rub or scratch the area to obtain relief. Itch can cause discomfort and frustration; in severe cases it can lead to disturbed sleep, anxiety and depression. Constant scratching to obtain relief can damage the skin (excoriation, lichenification) and reduce its effectiveness as a major protective barrier. Pruritus is often a symptom of an underlying disease process such as a skin problem, a systemic disease, or abnormal nerve impulses. What are skin signs of pruritus? There are no specific skin signs associated with pruritus, apart from scratch marks (excoriations) and signs of the underlying condition. Persistent scratching over a period of time may lead to:  Lichenification (thickened skin, lichen simplex)   Prurigo papules and nodules. Who gets pruritus? The epidemiology of pruritus depends on its underlying cause or causes. However, in general, the incidence of chronic pruritus increases with age, it is more common in women, and in those of  background.      Mechanisms underlying pruritus  Itch, like pain, can originate anywhere along the neural itch pathway, from the central nervous system (brain and spinal cord) to the peripheral nervous system and the skin. Mechanisms underlying pruritus are complex. The itch signal is transmitted mainly through small, itch-selective C-fibers in the skin in addition to histamine-triggered and non-histaminergic neurons. These connect with secondary neurons which cross the opposite side of the spinothalamic tract and ascend to parts of the brain involved in sensation, emotion, reward and memory. These areas overlap with those activated by pain. Patients with chronic pruritus usually have both peripheral and central hypersensitization (heightened reaction) which means they tend to overreact to noxious stimuli which normally inhibit itch (such as heat and scratching) and also misinterpret non-noxious stimuli as an itch (eg, light touch)    The way scratching stops itching has been explained by an interaction with pain pathways within the dorsal horn of the spinal cord. Localized pruritus  Localized pruritus is pruritus that is confined to a certain part of the body. It can occur in association with a primary rash (eg dermatitis) or may occur because of hypersensitive nerves in the skin (neuropathic pruritus). Neuropathic pruritus is due to compression or degeneration of nerves in the skin, on route to the spine or in the spine itself. Neuropathic itch is sometimes associated with reduced or absent sweating in the affected area of skin.   Typical causes of localized itchy rashes  Scalp: seborrhoeic dermatitis, head lice   Back: Chris disease   Hands: pompholyx, irritant and/or allergic contact dermatitis   Genitals: vulvovaginal Candida albicans infection, lichen sclerosus   Legs: venous eczema   Feet: tinea pedis    Neuropathic causes of localized pruritus without primary rash  Face: trigeminal trophic syndrome   Hand: cheiralgia paraesthetica   Arm: brachioradial pruritus   Back: notalgia paraesthetica/topics/brachioradial-pruritus/   Genital: pruritus vulvae, pruritus ani   Dermatomal: herpes zoster (shingles) during recovery phase  Scratching a localised itch may lead to lichen simplex, prurigo or prurigo nodularis. Systemic causes of pruritus  Sytemic diseases may cause generalised pruritus. This is sometimes called metabolic itch. There is nothing wrong with the skin itself, at least until it's been scratched. Metabolic disorders include chronic renal failure (dialysis) and liver disease (with or without cholestasis). Uraemic pruritus arises in patients undergoing dialysis is due to a combination of xerosis (dry skin), secondary hyperparathyroidism, peripheral neuropathy (nerve changes) and inflammation. Secondary hyperparathyroidism which also occurs in dialysis patients leads to microprecipitation (deposition) of calcium and magnesium salts in the skin, triggering mast cell degeneration, releasing serotonin and histamine. Once chronic pruritus has occurred, there may be secondary changes in the nerves in the skin and central nervous system which heighten the sensation of itch. Hepatogenic pruritus is more common in intrahepatic than extrahepatic cholestasis. Examples of intrahepatic cholestasis is associated with chronic viral hepatitis, primary biliary cirrhosis, pregnancy-related cholestasis. Extra-hepatic cholestasis is associated with pressure on the bile ducts, eg from pancreatic tumours or pseudocysts. Cholestasis is thought to release toxic substances from the liver, which stimulates neural itch fibres in the skin. Characteristically, cholestatic pruritus is most severe at night; it tends to affect the hands, feet and areas where clothes are rubbing on the skin. Haematological disorders include iron deficiency anaemia and polycythaemia vera. Generalized pruritus along with glossitis (tongue inflammation) and angular cheilitis (inflammation of mouth corners) are seen in iron deficiency anaemia.    In polycythaemia vera, itch is usually precipitated by contact with water (aquagenic pruritus), eg after a shower. This is thought to be mediated by the effect of platelets, serotonin and prostaglandins. Endocrine disorders include thyroid disease and diabetes mellitus. In Graves' disease (thyrotoxicosis), increased blood flow, skin temperature and decreased itch threshold mediated by the increase in thyroid hormones, lead to the itch. Pruritus associated with myxoedema and hypothyroidism is rare, and if present, is more likely the result of xerosis (dry skin). In diabetes mellitus, localized itch tends to occur in the perianal/genital region usually due to Candida albicans or dermatophyte infections. It is unclear if metabolic abnormalities such as renal impairment, autonomic failure or diabetic neuropathy contribute to this. Paraneoplastic itch is associated with lymphoma, especially Hodgkin lymphoma, leukemia or a solid organ tumor (eg lung, colon, brain). In Hodgkin lymphoma, pruritus is thought to be caused by histamine release, which may be related to eosinophilia. Infections causing itch include human immunodeficiency virus infection (HIV) and hepatitis C virus. Patients with HIV commonly complain of itch. This may be associated with skin infections/infestations, dry skin, drug reactions, hyperoesinophilia (increased eosinophil levels) and cutaneous T cell lymphoma. There is a possible correlation between intractable pruritus and increased HIV viral load. In chronic hepatitis C infection, the mechanisms responsible for itch remain unclear. In the absence of cholestasis, pruritus may be related to antiviral therapy; it has been noted to occur in patients treated with combination therapy (interferon pascual and ribavirin). Pruritic skin diseases  Pruritus is often a symptom of many skin diseases.  Some of these are included in the following list.  Allergic contact dermatitis   Dry skin   Urticaria   Psoriasis   Atopic dermatitis Folliculitis   Dermatitis herpetiformis   Lichen simplex   Lichen planus   Bullous pemphigoid   Lice   Scabies   Miliaria   Sunburn   Pityriasis rosea   Mycosis fungoides    Exposure-related pruritus  Pruritus may arise as a result of exposure to certain external factors. Allergens or irritants   Cold, which can cause 'winter itch'   A physical urticaria, such as dermographism   Aquagenic pruritus (itch on exposure to water)   Insects and infestations, eg scabies   Medications (topical or systemic) eg opioids, aspirin    Hormonal reasons for pruritus  About 2% of pregnant women have pruritus without any obvious dermatological cause. In some cases the itch is due to cholestasis (pooling of bile in the gall bladder and liver). It usually occurs in the 3rd trimester and is relieved after giving birth. Generalized itch is also a common symptom of menopause. How is pruritus diagnosed? The first steps of evaluation of an itchy patient are medical history and examination. A thorough history can identify constitutional symptoms that may point towards an underlying systemic disease. Drug triggers such as opioids may be identified, especially if the commencement of the drug relates to the itch. A careful examination can identify dermatological causes for the itch (eg scabies, lichen simplex, pemphigoid) or evidence of chronic skin changes related to the itch. In dermatological causes of pruritus, primary skin lesions will usually suggest the diagnosis. Patients without primary skin lesions and little evidence of chronic scratching should be investigated for systemic, neuropathic and psychogenic causes. The panel of investigations could include:  Full/complete blood count   Creatinine and renal function tests   Liver function tests   Thyroid function tests   Erythrocyte sedimentation rate   Chest radiography   HIV serology    What treatment is available for itch?   The management of pruritus relies on establishing the cause and then either removing or treating the cause to prevent further itching. In many cases, tests are necessary to determine the cause; while these are in progress, treatment to provide symptomatic relief of pruritus may be given. Topical treatments  In addition to specific therapy for any underlying skin or internal disease, topical treatment may include:  Wet dressings or tepid shower to cool the skin   Calamine lotion (contains phenol, which cools the skin): avoid on dry skin and limit use to a few days   Menthol/camphor lotion: gives a chilling sensation   Local anesthetics, such as pramoxine (also called pramocaine), applied to small itchy spots such as insect bites   Regular use of emollients, especially if skin is dry   Mild topical corticosteroids for short periods   Topical calcineurin inhibitors are also used to reduce itch associated with inflammatory skin conditions   Topical doxepin, a tricyclic antidepressant and antihistamine, is an antipruritic used in eczema. Other measures that can be useful in preventing pruritus include avoiding precipitating factors such as rough clothing or fabrics, overheating, and vasodilators if they provoke itching (eg, caffeine, alcohol, spices). Fingernails should be kept short and clean. If the urge to scratch is irresistible then rub the area with your palm. Topical antihistamines should not be used for chronic itch, as they may sensitize the skin and result in allergic contact dermatitis. Systemic therapy  If pruritus is severe and sleep is disturbed, then treatment with oral medication may be necessary. Some drugs may help to relieve the itch whilst others are given solely for their sedative effects. Antihistamines are most useful in urticaria, in which histamine is released. Use for other pruritic conditions is not supported by randomized control trials. Sedating antihistamines may be used for their sedative effects.    Doxepin and amitriptyline are tricyclic antidepressants have antipruritic action and act on the central and peripheral nervous systems. Tetracyclic antidepressants such as mirtazepine and selective serotonin reuptake inhibitors (paroxetine, sertraline, fluoxetine) may also help some patients with severe itch including when it is caused by cholestasis, T-cell lymphoma, malignancy or a neuropathic condition. Anti-epileptic drugs such as sodium valproate, gabapentin and pregabalin may also be of benefit to some patients, e.g., those with itch associated with renal failure or neuropathic itch. The mechanism of action is uncertain. Opioid antagonists such as butorphanol intranasal spray, naltrexone tablets, and naloxone have been effective in patients suffering from intractable pruritus in association with liver disease, atopic eczema and chronic urticaria. Nalfurafine, which is a kappa-opioid agonist has also been studied and shown to reduce itch associated with chronic renal impairment, however it is not widely available. Aspirin is sometimes effective if pruritus is mediated by kinins or prostaglandins and is noted to be effective in patients with pruritus due to polycythaemia vera. Note: aspirin may cause or aggravate itch in some patients. Thalidomide has been successful in treating nodular prurigo and chronic pruritus of various kinds but is rarely used because of serious adverse effects and expense. Rifampicin is effective for patients with pruritus associated with cholestasis (some forms of liver disease). Isolated case reports in severe itch associated with malignancy have reported success with the NKR1 antagonist, aprepitant (normally used short-term for postoperative or chemotherapy-induced nausea). This is under investigation for neuropathic itch and nodular prurigo.     Phototherapy  Broadband ultraviolet B or narrow-band UVB phototherapy alone, or in conjunction with UVA, has been shown to be helpful for pruritus associated with chronic kidney disease, psoriasis, atopic eczema and cutaneous T-cell lymphoma. Behavioral therapy  Behavioral therapy may be used in conjunction with pharmacotherapy to modify behaviours such as coping mechanisms and stress reduction, which help interrupt the itch-scratch cycle. One randomized controlled trial showed short-term benefits in reduction in itch frequency and scratching as well as improvement in coping mechanisms. What is the outcome for pruritus? The management of chronic severe itch is difficult and often requires the use of combination therapy over a long period of time. Identification and treatment of underlying conditions causing pruritus may help in this process. The symptom may quickly disappear or persist for long periods of time.

## 2023-08-25 NOTE — PROGRESS NOTES
[facility] Note     Patient Name: Angela Pizano  Encounter Date: 8/25/23     Have you been cared for by a [facility] Dermatologist in the last 3 years and, if so, which description applies to you? Yes. I have been here within the last 3 years, and my medical history has NOT changed since that time. I am MALE/not capable of bearing children. REVIEW OF SYSTEMS:  Have you recently had or currently have any of the following? · No changes in my recent health. PAST MEDICAL HISTORY:  Have you personally ever had or currently have any of the following? If "YES," then please provide more detail. · No changes in my medical history. FAMILY HISTORY:  Any "first degree relatives" (parent, brother, sister, or child) with the following? • No changes in my family's known health. PATIENT EXPERIENCE:    • Do you want the Dermatologist to perform a COMPLETE skin exam today including a clinical examination under the "bra and underwear" areas? Yes  • If necessary, do we have your permission to call and leave a detailed message on your Preferred Phone number that includes your specific medical information? Yes      Allergies   Allergen Reactions   • Atorvastatin Other (See Comments)   • Simvastatin Other (See Comments)      Current Outpatient Medications:   •  amLODIPine-benazepril (LOTREL 2.5-10) 2.5-10 MG per capsule, Take 1 capsule by mouth daily, Disp: , Rfl:   •  clindamycin (CLEOCIN T) 1 % external solution, Apply topically 2 (two) times a day To face.  Angela Pizano, [facility], # [id number], Disp: 30 mL, Rfl: 2  •  FLUoxetine (PROzac) 20 mg/5 mL solution, Take by mouth daily, Disp: , Rfl:   •  omeprazole (PriLOSEC) 40 MG capsule, Take 40 mg by mouth daily, Disp: , Rfl:   •  rosuvastatin (CRESTOR) 10 MG tablet, Take 5 mg by mouth daily, Disp: , Rfl:   •  simethicone (MYLICON) 80 mg chewable tablet, Chew 80 mg every 6 (six) hours as needed for flatulence, Disp: , Rfl:           • Whom besides the patient is providing clinical information about today's encounter?   o NO ADDITIONAL HISTORIAN (patient alone provided history)    Physical Exam and Assessment/Plan by Diagnosis:  MELANOCYTIC NEVI ("Moles")    Physical Exam:  • Anatomic Location Affected: Mostly on sun-exposed areas of the trunk and extremeties  • Morphological Description:  Scattered, 1-4mm round to ovoid, symmetrical-appearing, even bordered, skin colored to dark brown macules/papules, mostly in sun-exposed areas  • Pertinent Positives:  • Pertinent Negatives:    Assessment and Plan:  Based on a thorough discussion of this condition and the management approach to it (including a comprehensive discussion of the known risks, side effects and potential benefits of treatment), the patient (family) agrees to implement the following specific plan:  • Provided handout with information regarding the ABCDE's of moles   • Recommend routine skin exams every year   • Sun avoidance, protective clothing (known as UPF clothing), and the use of at least SPF 30 sunscreens is advised. Sunscreen should be reapplied every two hours when outside. SEBORRHEIC KERATOSIS; NON-INFLAMED    Physical Exam:  • Anatomic Location Affected:  scattered across trunk, extremities  • Morphological Description:  Flat and raised, waxy, smooth to warty textured, yellow to brownish-grey to dark brown to blackish, discrete, "stuck-on" appearing papules. • Pertinent Positives:  • Pertinent Negatives: Additional History of Present Condition:  Patient reports new bumps on the skin. Denies itch, burn, pain, bleeding or ulceration. Present constantly; nothing seems to make it worse or better. No prior treatment.       Assessment and Plan:  Based on a thorough discussion of this condition and the management approach to it (including a comprehensive discussion of the known risks, side effects and potential benefits of treatment), the patient (family) agrees to implement the following specific plan:  • Reassured benign    ANGIOMA ("CHERRY ANGIOMA")    Physical Exam:  • Anatomic Location: scattered across sun exposed areas of the trunk and extremities   • Morphologic Description: Firm red to reddish-blue discrete papules  • Pertinent Positives:  • Pertinent Negatives: Additional History of Present Condition:  Present on exam.     Assessment and Plan:  • Reassured benign    NEOPLASM OF UNCERTAIN BEHAVIOR OF SKIN    Physical Exam:  • (Anatomic Location); (Size and Morphological Description); (Differential Diagnosis):  o Left upper back; 7x8 mm red macule ddx-superficial basal cell carcinoma vs lichenoid keratosis  o   o   o   • Pertinent Positives:  • Pertinent Negatives: Additional History of Present Condition:  Found on skin check    Assessment and Plan:  • I have discussed with the patient that a sample of skin via a "skin biopsy” would be potentially helpful to further make a specific diagnosis under the microscope. • Based on a thorough discussion of this condition and the management approach to it (including a comprehensive discussion of the known risks, side effects and potential benefits of treatment), the patient (family) agrees to implement the following specific plan:    o Procedure:  Skin Biopsy. After a thorough discussion of treatment options and risk/benefits/alternatives (including but not limited to local pain, scarring, dyspigmentation, blistering, possible superinfection, and inability to confirm a diagnosis via histopathology), verbal and written consent were obtained and portion of the rash was biopsied for tissue sample. See below for consent that was obtained from patient and subsequent Procedure Note.     PROCEDURE TANGENTIAL (SHAVE) BIOPSY NOTE:    • Performing Physician: Dr.[place] Dr. Usha Sullivan  • Anatomic Location; Clinical Description with size (cm); Pre-Op Diagnosis:   Left upper back; skin; shave biopsy 7x 8 mm red macule ddx-superficial basal cell carcinoma vs lichenoid keratosis  • Post-op diagnosis: Same     • Local anesthesia: 1% xylocaine with epi      • Topical anesthesia: None    • Hemostasis: Aluminum chloride       After obtaining informed consent  at which time there was a discussion about the purpose of biopsy  and low risks of infection and bleeding. The area was prepped and draped in the usual fashion. Anesthesia was obtained with 1% lidocaine with epinephrine. A shave biopsy to an appropriate sampling depth was obtained by Shave (Dermablade or 15 blade) The resulting wound was covered with surgical ointment and bandaged appropriately. The patient tolerated the procedure well without complications and was without signs of functional compromise. Specimen has been sent for review by Dermatopathology. Standard post-procedure care has been explained and has been included in written form within the patient's copy of Informed Consent. ROSACEA    Physical Exam:  • Anatomic Location Affected:   face  • Morphological Description:  Pink patches and papules  • Pertinent Positives:  • Pertinent Negatives: Additional History of Present Condition: Present for a long time     Assessment and Plan:  Based on a thorough discussion of this condition and the management approach to it (including a comprehensive discussion of the known risks, side effects and potential benefits of treatment), the patient (family) agrees to implement the following specific plan:  • Metronidazole gel 0.75% apply topically on face daily    PRURIGO NODULARIS    Physical Exam:2  • Anatomic Location Affected:  Left shoulder and left forearm  • Morphological Description:  Purple 0.4cm papule (2)  • Pertinent Positives:  • Pertinent Negatives:     Additional History of Present Condition:  Present for months to years, picks at spots    Assessment and Plan:  Based on a thorough discussion of this condition and the management approach to it (including a comprehensive discussion of the known risks, side effects and potential benefits of treatment), the patient (family) agrees to implement the following specific plan:  • Triamicinolone 1% apply topically to affected spots twice a day for two weeks then off 1 week.   • Avoid scratching or picking    Scribe Attestation    I,:  Raleigh Lovelady, MA am acting as a scribe while in the presence of the attending physician.:       I,:  Dori Shetty MD personally performed the services described in this documentation    as scribed in my presence.:

## 2023-08-30 PROCEDURE — 88305 TISSUE EXAM BY PATHOLOGIST: CPT | Performed by: PATHOLOGY

## 2023-09-01 ENCOUNTER — TELEPHONE (OUTPATIENT)
Dept: DERMATOLOGY | Facility: CLINIC | Age: 73
End: 2023-09-01

## 2023-09-01 NOTE — TELEPHONE ENCOUNTER
----- Message from Rhianna Douglas MD sent at 8/31/2023  2:30 PM EDT -----  DERMATOPATHOLOGY RESULT NOTE    Results reviewed by ordering physician. Called patient to personally discuss results. Discussed results with patient. Pt agreeable to [address]. Instructions for Clinical Derm Team:   (remember to route Result Note to appropriate staff):    Call patient and schedule for [address] with Dr. Lefty Casper in resident excision clinic with Dr. Mo Kim by Specimen:    Specimen A: malignant, bcc superficial  Plan: electrodessication and curretage    Tissue Exam: [id number]  Order: [id number]  Status: Final result     Visible to patient: No (inaccessible in [address] [place] [address])     Dx: Neoplasm of uncertain behavior of skin     1 Result Note     Component   Case Report  Surgical Pathology Report                         Case: [id number]                                   Authorizing Provider: Jaden Eldridge MD              Collected:           08/25/2023 [facility]              Ordering Location:     [facility]      Received:            08/25/2023 [address] [place] [address]                                                                       Pathologist:           Cuauhtemoc Castellanos MD                                                             Specimen:    Skin, Other, Specimen A-left upper back                                                  Final Diagnosis  A.  Skin, left upper back, shave biopsy:  Basal cell carcinoma, superficial type.     Electronically signed by Cuauhtemoc Castellanos MD on 8/30/2023 at  2:22 PM  Additional Information   All reported additional testing was performed with appropriately reactive controls.  These tests were developed and their performance characteristics determined by [facility] or appropriate performing facility, though some tests may be performed on tissues which have not been validated for performance characteristics (such as staining performed on alcohol exposed cell blocks and decalcified tissues).  Results should be interpreted with caution and in the context of the patients' clinical condition. These tests may not be cleared or approved by the U.S. Food and Drug Administration, though the FDA has determined that such clearance or approval is not necessary. These tests are used for clinical purposes and they should not be regarded as investigational or for research. This laboratory has been approved by [facility], designated as a high-complexity laboratory and is qualified to perform these tests. Marlen Fernandez Description   A. The specimen is received in formalin, labeled with the patient's name and hospital number, and is designated " left upper back". The specimen consists of a 1.1 x 1.0 x 0.1 cm shave biopsy of tan-pink skin. The presumed epithelial surface appears keratotic and is inked red and the margin of resection is inked green. The specimen is serially sectioned revealing grossly unremarkable cut surfaces. The specimen is entirely submitted between sponges, 1 cassette.     Note: The estimated total formalin fixation time based upon information provided by the submitting clinician and the standard processing schedule is under 72 hours. -[facility]  Clinical Information   ATTENTION: [facility]     SPECIMEN A; Skin;  Anatomic Location: left upper back; Procedure shave biopsy   68y.o. year old  Male with a Morphological Description:Left upper back; 7x 8 mm red macule dxx-superficial basal cell carcinoma vs lichenoid keratosis

## 2023-09-20 ENCOUNTER — PROCEDURE VISIT (OUTPATIENT)
Dept: DERMATOLOGY | Facility: CLINIC | Age: 73
End: 2023-09-20
Payer: COMMERCIAL

## 2023-09-20 VITALS — BODY MASS INDEX: 34.46 KG/M2 | HEIGHT: 67 IN

## 2023-09-20 DIAGNOSIS — C44.619 BASAL CELL CARCINOMA (BCC) OF SKIN OF LEFT UPPER EXTREMITY INCLUDING SHOULDER: Primary | ICD-10-CM

## 2023-09-20 PROCEDURE — 88305 TISSUE EXAM BY PATHOLOGIST: CPT | Performed by: STUDENT IN AN ORGANIZED HEALTH CARE EDUCATION/TRAINING PROGRAM

## 2023-09-20 PROCEDURE — 11603 EXC TR-EXT MAL+MARG 2.1-3 CM: CPT | Performed by: DERMATOLOGY

## 2023-09-20 PROCEDURE — 12032 INTMD RPR S/A/T/EXT 2.6-7.5: CPT | Performed by: DERMATOLOGY

## 2023-09-20 NOTE — PROGRESS NOTES
[facility] Note     Patient Name: Jennifer Delacruz  Encounter Date: 09/20/2023     Have you been cared for by a [facility] in the last 3 years and, if so, which description applies to you? Yes. I have been here within the last 3 years, and my medical history has NOT changed since that time. I am MALE/not capable of bearing children. REVIEW OF SYSTEMS:  Have you recently had or currently have any of the following? · No changes in my recent health. PAST MEDICAL HISTORY:  Have you personally ever had or currently have any of the following? If "YES," then please provide more detail. · No changes in my medical history. FAMILY HISTORY:  Any "first degree relatives" (parent, brother, sister, or child) with the following? • No changes in my family's known health. PATIENT EXPERIENCE:    • Do you want the Dermatologist to perform a COMPLETE skin exam today including a clinical examination under the "bra and underwear" areas? NO  • If necessary, do we have your permission to call and leave a detailed message on your Preferred Phone number that includes your specific medical information? Yes      Allergies   Allergen Reactions   • Atorvastatin Other (See Comments)   • Simvastatin Other (See Comments)      Current Outpatient Medications:   •  amLODIPine-benazepril (LOTREL 2.5-10) 2.5-10 MG per capsule, Take 1 capsule by mouth daily, Disp: , Rfl:   •  clindamycin (CLEOCIN T) 1 % external solution, Apply topically 2 (two) times a day To face.  Jennifer Delacruz, [address] [facility] [id number], Disp: 30 mL, Rfl: 2  •  FLUoxetine (PROzac) 20 mg/5 mL solution, Take by mouth daily, Disp: , Rfl:   •  metroNIDAZOLE (METROGEL) 0.75 % gel, Apply topically 2 (two) times a day, Disp: 45 g, Rfl: 2  •  omeprazole (PriLOSEC) 40 MG capsule, Take 40 mg by mouth daily, Disp: , Rfl:   •  rosuvastatin (CRESTOR) 10 MG tablet, Take 5 mg by mouth daily, Disp: , Rfl:   •  simethicone (MYLICON) 80 mg chewable tablet, Chew 80 mg every 6 (six) hours as needed for flatulence, Disp: , Rfl:   •  triamcinolone (KENALOG) 0.1 % cream, Apply to affected spots twice a day for 2 weeks on the 1 week off., Disp: 80 g, Rfl: 1          • Whom besides the patient is providing clinical information about today's encounter?   o NO ADDITIONAL HISTORIAN (patient alone provided history)    Physical Exam and Assessment/Plan by Diagnosis:    PROCEDURE:  [address]     Attending: Aracelis Parker  Assistant: Marty Fuentes    Pre-Op Diagnosis: basal cell carcinoma  Post-Op Diagnosis: Same   Benign versus Malignant malignant      Lesion Anatomic Location: left upper back (Previous Accession Number: [id number])  Pre-op size: 1.5 cm x 0.9  Size of defect:  2.5 cm x 1.9 cm (with 0.5 centimeter margins)  Final repaired wound length:  5.7 cm    Written and verbal, witnessed informed consent was obtained. I discussed that excision is a method of removing lesions both benign and malignant lesions. A portion of normal skin is often taken to ensure completeness of removal.  I reviewed that procedure will include numbing the area,  cutting around and under defect, undermining tissue, and closing the wound with sutures both inside and out. These sutures are usually removed in 7 to 14 days. Risks (bleeding, pain, infection, scarring, recurrence) and benefits discussed. It was discussed with patient that every effort is made to minimize scar, but scarring is influenced also by extrinsic factor such as location, age and genetics. Time Out: performed:  yes  Correct patient: yes. Correct site per Clinic Report: yes  Correct site per Patient Report: yes    LOCAL ANESTHESIA: 3:1 1% xylocaine with epi and 1-100,000 buffered    DESCRIPTION OF PROCEDURE: The patient was brought back into the procedure room, anesthetized locally, prepped and draped in the usual fashion.  Using a #15 blade with a scalpel, the lesion was excised in elliptical fashion. The wound was  undermined in the  fascial plane. Hemostasis was achieved with light electrocoagulation. Purpose of undermining was to decrease wound tension and facilitate closure. The wound was closed with subcutaneous sutures as follows:    Deep suture:3-0 Vicryl      Epidermal edge closure was accomplished with superficial sutures as follows:    Superficial suture: 3-0 Prolene  Superficial suture type: interrupted    Estimated blood loss less than 3ml. The patient tolerated the procedure well without any complications. The wound was cleaned with sterile saline, dried off, surgical vaseline ointment was applied, and the wound was covered. A pressure dressing was applied for stabilization and light pressure. The patient was given detailed oral and written instructions on postoperative care as detailed in consent. The patient left in good medical condition. POSTOP DISCUSSION DISCUSSION AND INSTRUCTIONS FOR PATIENT      Rationale for Procedure  A skin excision allows the dermatologist to remove a lesion. The procedure involves a local numbing medication and removing the entire lesion. Typically, the lesion is being removed because it is atypical, traumatized, or for significant appearance reasons. The area will be open like a brush burn and allowed to heal.   There will be no sutures. Tissue is sent to pathologist who will reconfirm diagnosis and verify completeness of lesion removal.    Description of Procedure  We would like to perform a skin excision today. A local anesthetic, similar to the kind that a dentist uses when filling a cavity, will be injected with a very small needle into the skin area to be sampled. The injected skin and tissue underneath should “go to sleep” and become numbed so that no further pain should be felt. A scalpel will be used to cut around the lesion and tissue will be submitted to pathology for examination.   Depending on the diagnosis the lesion will be excised with a certain amount of normal skin to help assure completeness of lesion removal.  The physician will discuss in advance the anticipated size and extent of removal.   Bleeding will occur, but it will stopped with direct pressure, sutures, and electrocautery. Surgical “Vaseline-type” ointment will also applied after the procedure to help create a barrier between the wound and the outside world. Risks and Potential Complications  The advantage of a skin excision is that it allows us to remove a problem lesion quickly. Although this usually permits the lesion to heal as soon as possible with the least scarring, there are some risks and potential complications that include but are not limited to the following:  - Some bleeding is normal at time of procedure and some bleeding on gauze is normal  the first few days after surgery. Profuse bleeding and bleeding with swelling and pain should be reported as detailed  below  - Infection is uncommon in skin surgery. Infection should be reported and is indicated by pain, redness, and discharge of purulent material.  - Some dull to at time sharp pain could occur initially the day after surgery. Persistent pain or increasing pain days after surgery is not expected and should be reported. - Every effort is made to minimize scar, but location, size, and genetics do play a role in scar appearance. A surgical wound does not achieve its optimal appearance until 6 months. There are several treatments available if scarring would be problematic including scar creams, silicone pad, laser and scar revision.  - Skin discoloration can occur especially in people of color. Its important to avoid sun on wound in first 6 months after surgery.   Treatment is available if pigment is problematic.  - Incomplete removal of the lesion or recurrence of lesion can occur and this would then require further treatment and more surgery.  - Nerve Damage/Numbness/Loss of Function is very rare, but is most likely to occur if lesion is large or if it is in a high risk location  - Allergic Reaction to lidocaine is rare. More commonly,  epinephrine is used  with the lidocaine. Occasionally, epinephrine (aka adrenalin) may cause a brief  feeling of anxiety or jitteriness. - The person at the microscope  (pathologist) may provide additional information that was unexpected. This unexpected finding could provoke the need for additional treatment or evaluation. What You Will Need to Do After the Procedure  1. Keep the area clean and dry the first day. Try NOT to remove the bandage for the first day. 2. Gently clean the area with soap and water and apply Vaseline ointment (this is over the counter and not a prescription) to the excision  site for up to 2 weeks. 3. Apply a clean appropriately sized bandage to area. Gauze and paper tape are recommended for sensitive skin. 4. Return for suture removal as instructed (generally 1 week for the face, 2 weeks for the body). 5. Take Acetaminophen (Tylenol) for discomfort, if no contraindications. Do NOT take Ibuprofen or aspirin unless specifically told to do so by your Dermatologist because these medications can make bleeding worse. 6. Call our office immediately for signs of infection: fever, chills, increased redness, warmth, tenderness, discomfort/pain, or pus or foul smell coming from the wound. If bleeding is noticed, place a clean cloth over the area and apply firm pressure for thirty minutes. Check the wound ONLY after 30 minutes of direct pressure; do not cheat and sneak a peak, as that does not count. If bleeding persists after 30 minutes of legitimate direct pressure, then try one more round of direct pressure for an additional 10 minutes to the area.   Should the bleeding become heavier or not stop after the second attempt, call [facility] directly at [phone] (SKIN) or, if after hours, go to your local Emergency Room/Emergency Department. BCC excised with 0.5cm margins for 2.5cm excision and 5.7cm closure. Well tolerated. S/R in 2 weeks.   Scribe Attestation    I,:  Jesenia Barrow am acting as a scribe while in the presence of the attending physician.:       I,:  Selene Moralez MD personally performed the services described in this documentation    as scribed in my presence.:

## 2023-09-20 NOTE — PATIENT INSTRUCTIONS
POSTOP DISCUSSION DISCUSSION AND INSTRUCTIONS FOR PATIENT      Rationale for Procedure  A skin excision allows the dermatologist to remove a lesion. The procedure involves a local numbing medication and removing the entire lesion. Typically, the lesion is being removed because it is atypical, traumatized, or for significant appearance reasons. The area will be open like a brush burn and allowed to heal.   There will be no sutures. Tissue is sent to pathologist who will reconfirm diagnosis and verify completeness of lesion removal.    Description of Procedure  We would like to perform a skin excision today. A local anesthetic, similar to the kind that a dentist uses when filling a cavity, will be injected with a very small needle into the skin area to be sampled. The injected skin and tissue underneath should “go to sleep” and become numbed so that no further pain should be felt. A scalpel will be used to cut around the lesion and tissue will be submitted to pathology for examination. Depending on the diagnosis the lesion will be excised with a certain amount of normal skin to help assure completeness of lesion removal.  The physician will discuss in advance the anticipated size and extent of removal.   Bleeding will occur, but it will stopped with direct pressure, sutures, and electrocautery. Surgical “Vaseline-type” ointment will also applied after the procedure to help create a barrier between the wound and the outside world. Risks and Potential Complications  The advantage of a skin excision is that it allows us to remove a problem lesion quickly. Although this usually permits the lesion to heal as soon as possible with the least scarring, there are some risks and potential complications that include but are not limited to the following:  Some bleeding is normal at time of procedure and some bleeding on gauze is normal  the first few days after surgery.   Profuse bleeding and bleeding with swelling and pain should be reported as detailed  below  Infection is uncommon in skin surgery. Infection should be reported and is indicated by pain, redness, and discharge of purulent material.  Some dull to at time sharp pain could occur initially the day after surgery. Persistent pain or increasing pain days after surgery is not expected and should be reported. Every effort is made to minimize scar, but location, size, and genetics do play a role in scar appearance. A surgical wound does not achieve its optimal appearance until 6 months. There are several treatments available if scarring would be problematic including scar creams, silicone pad, laser and scar revision. Skin discoloration can occur especially in people of color. Its important to avoid sun on wound in first 6 months after surgery. Treatment is available if pigment is problematic. Incomplete removal of the lesion or recurrence of lesion can occur and this would then require further treatment and more surgery. Nerve Damage/Numbness/Loss of Function is very rare, but is most likely to occur if lesion is large or if it is in a high risk location  Allergic Reaction to lidocaine is rare. More commonly,  epinephrine is used  with the lidocaine. Occasionally, epinephrine (aka adrenalin) may cause a brief  feeling of anxiety or jitteriness. The person at the microscope  (pathologist) may provide additional information that was unexpected. This unexpected finding could provoke the need for additional treatment or evaluation. What You Will Need to Do After the Procedure  Keep the area clean and dry the first day. Try NOT to remove the bandage for the first day. Gently clean the area with soap and water and apply Vaseline ointment (this is over the counter and not a prescription) to the excision  site for up to 2 weeks. Apply a clean appropriately sized bandage to area. Gauze and paper tape are recommended for sensitive skin.   Return for suture removal as instructed (generally 1 week for the face, 2 weeks for the body). Take Acetaminophen (Tylenol) for discomfort, if no contraindications. Do NOT take Ibuprofen or aspirin unless specifically told to do so by your Dermatologist because these medications can make bleeding worse. Call our office immediately for signs of infection: fever, chills, increased redness, warmth, tenderness, discomfort/pain, or pus or foul smell coming from the wound. If bleeding is noticed, place a clean cloth over the area and apply firm pressure for thirty minutes. Check the wound ONLY after 30 minutes of direct pressure; do not cheat and sneak a peak, as that does not count. If bleeding persists after 30 minutes of legitimate direct pressure, then try one more round of direct pressure for an additional 10 minutes to the area. Should the bleeding become heavier or not stop after the second attempt, call [facility] directly at [phone] (SKIN) or, if after hours, go to your local Emergency Room/Emergency Department.

## 2023-09-25 PROCEDURE — 88305 TISSUE EXAM BY PATHOLOGIST: CPT | Performed by: STUDENT IN AN ORGANIZED HEALTH CARE EDUCATION/TRAINING PROGRAM

## 2023-09-27 ENCOUNTER — TELEPHONE (OUTPATIENT)
Age: 73
End: 2023-09-27

## 2023-09-27 NOTE — TELEPHONE ENCOUNTER
Patient calling to ask if he can stop applying the bandage to his biopsy site. He is becoming itchy and he cant reach to change the bandage so he has to have his neighbor assist him. I advised the patient to continue applying a fresh bandage until after his sutures are removed. The sutures are on his left upper back and we do not want to risk having his shirt get caught on the sutures and ripping them out which can then lead to an infection. Patient verbally understood and agreed to keep applying a fresh a bandage after his shower.

## 2023-10-04 ENCOUNTER — OFFICE VISIT (OUTPATIENT)
Dept: DERMATOLOGY | Facility: CLINIC | Age: 73
End: 2023-10-04

## 2023-10-04 DIAGNOSIS — Z48.02 ENCOUNTER FOR REMOVAL OF SUTURES: Primary | ICD-10-CM

## 2023-10-04 PROCEDURE — RECHECK: Performed by: STUDENT IN AN ORGANIZED HEALTH CARE EDUCATION/TRAINING PROGRAM

## 2023-10-04 NOTE — PATIENT INSTRUCTIONS
Patient instructed to follow up in 6 months for a full body skin exam. Pt is scheduled 2/2/2024. POST-SURGERY SCAR CARE    We advise you start silicone scar gel to scar lines with firm massage to the scar after 2 weeks. [employer] brand makes a gel that you can buy and is available over the counter. You can find this in the band-aid aisle. Please remember to use sunscreen daily after your wound has healed.  We recommend a broad spectrum sunscreen with SPF of at least 30 and sun protective clothing, shade, etc.

## 2023-10-04 NOTE — PROGRESS NOTES
Suture removal    Date/Time: 10/4/2023 11:00 AM    Performed by: Kristin Gaston RN  Authorized by: Kellie Puentes MD  Universal Protocol:  Consent: Verbal consent obtained. Risks and benefits: risks, benefits and alternatives were discussed  Consent given by: patient  Time out: Immediately prior to procedure a "time out" was called to verify the correct patient, procedure, equipment, support staff and site/side marked as required. Timeout called at: 10/4/2023 10:48 AM.  Patient understanding: patient states understanding of the procedure being performed  Patient consent: the patient's understanding of the procedure matches consent given  Procedure consent: procedure consent matches procedure scheduled  Relevant documents: relevant documents present and verified  Test results: test results available and properly labeled  Site marked: the operative site was marked  Radiology Images displayed and confirmed. If images not available, report reviewed: imaging studies not available  Patient identity confirmed: verbally with patient        Patient location:  Clinic  Location:     Laterality:  Left    Location:  Trunk    Trunk location:  Back (Left upper back)  Procedure details: Tools used:  Suture removal kit    Wound appearance:  No sign(s) of infection, good wound healing, nontender and pink (Erythema around the area from the adhesive)    Number of sutures removed:  10  Post-procedure details:     Post-removal:  No dressing applied (Vaseline applied)    Patient tolerance of procedure: Tolerated well, no immediate complications  Comments:      Patient instructed to follow up in 6 months for a full body skin exam. Pt scheduled 2/2/2024. POST-SURGERY SCAR CARE    We advise you start silicone scar gel to scar lines with firm massage to the scar after 2 weeks. [employer] brand makes a gel that you can buy and is available over the counter. You can find this in the band-aid aisle.      Please remember to use sunscreen daily after your wound has healed.  We recommend a broad spectrum sunscreen with SPF of at least 30 and sun protective clothing, shade, etc.

## 2024-02-02 ENCOUNTER — OFFICE VISIT (OUTPATIENT)
Dept: DERMATOLOGY | Facility: CLINIC | Age: 74
End: 2024-02-02
Payer: COMMERCIAL

## 2024-02-02 VITALS — WEIGHT: 222 LBS | BODY MASS INDEX: 34.84 KG/M2 | TEMPERATURE: 97.8 F | HEIGHT: 67 IN

## 2024-02-02 DIAGNOSIS — L57.8 OTHER SKIN CHANGES DUE TO CHRONIC EXPOSURE TO NONIONIZING RADIATION: ICD-10-CM

## 2024-02-02 DIAGNOSIS — Z85.828 HISTORY OF BASAL CELL CARCINOMA: Primary | ICD-10-CM

## 2024-02-02 DIAGNOSIS — D18.01 CHERRY ANGIOMA: ICD-10-CM

## 2024-02-02 DIAGNOSIS — L82.1 SEBORRHEIC KERATOSIS: ICD-10-CM

## 2024-02-02 DIAGNOSIS — L81.4 SOLAR LENTIGO: ICD-10-CM

## 2024-02-02 DIAGNOSIS — L28.1 PRURIGO NODULARIS: ICD-10-CM

## 2024-02-02 DIAGNOSIS — D22.72 MULTIPLE BENIGN MELANOCYTIC NEVI OF UPPER AND LOWER EXTREMITIES AND TRUNK: ICD-10-CM

## 2024-02-02 DIAGNOSIS — D22.5 MULTIPLE BENIGN MELANOCYTIC NEVI OF UPPER AND LOWER EXTREMITIES AND TRUNK: ICD-10-CM

## 2024-02-02 DIAGNOSIS — D22.71 MULTIPLE BENIGN MELANOCYTIC NEVI OF UPPER AND LOWER EXTREMITIES AND TRUNK: ICD-10-CM

## 2024-02-02 DIAGNOSIS — D22.62 MULTIPLE BENIGN MELANOCYTIC NEVI OF UPPER AND LOWER EXTREMITIES AND TRUNK: ICD-10-CM

## 2024-02-02 DIAGNOSIS — D22.61 MULTIPLE BENIGN MELANOCYTIC NEVI OF UPPER AND LOWER EXTREMITIES AND TRUNK: ICD-10-CM

## 2024-02-02 PROCEDURE — 17110 DESTRUCTION B9 LES UP TO 14: CPT | Performed by: DERMATOLOGY

## 2024-02-02 PROCEDURE — 99213 OFFICE O/P EST LOW 20 MIN: CPT | Performed by: DERMATOLOGY

## 2024-02-02 NOTE — PATIENT INSTRUCTIONS
PRURIGO NODULARIS    Based on a thorough discussion of this condition and the management approach to it (including a comprehensive discussion of the known risks, side effects and potential benefits of treatment), the patient (family) agrees to implement the following specific plan:  Cryotherapy performed in the office. Consent obtained      What is nodular prurigo?  Nodular prurigo is a skin condition characterised by very itchy firm lumps. It is the most severe form of prurigo. It is not known why these lumps appear. It is also called ‘prurigo nodularis’.  Nodular prurigo is very difficult to treat effectively.    Who gets nodular prurigo?  Nodular prurigo can occur at all ages but mainly in adults aged 20-60 years. Both sexes are equally affected.  Up to 80% of patients have a personal or family history of atopic dermatitis, asthma or hay fever (compared to about 25% of the normal population). It has been associated with internal disease including iron deficiency anaemia, chronic renal failure, gluten enteropathy, HIV infection and many other diverse conditions.    What is the cause of nodular prurigo?  The cause of nodular prurigo is unknown. It is uncertain whether scratching leads to the nodules or if the nodules appear before they are scratched. The reason for the nodules, the inflammation and the increased activity and size of nerves in the skin is under investigation but remains unknown.  Nodular prurigo may commence as an insect bite reaction or another form of dermatitis.     In some cases, nodular prurigo has been associated with brachioradial pruritus, which is due to compression or traction of spinal nerves. This theory may explain why local treatment is not always successful. It has been speculated that widespread nodular prurigo may also follow sensitisation of the spinal nerves and that the nodules appear because of scratching.    What are the clinical features of nodular prurigo?  The individual  prurigo nodule is a firm lump, 1-3 cm in diameter, often with a raised warty surface. The early lesion may start as a smaller red itchy bump. Crusting and scaling may cover recently scratched lesions. Older lesions may be darker or paler than surrounding skin. The skin in between the nodules is often dry. The itch is often very intense, often for hours on end, leading to vigorous scratching and sometimes secondary infection.    Nodular prurigo lesions are usually grouped and numerous but may vary in number from 2-200. Nodular prurigo tends to be symmetrically distributed. They usually start on the lower arms and legs and are worse on the outer aspects. The trunk, face and even palms can also be affected. Sometimes the prurigo nodules are most obvious on the cape area (neck, shoulders and upper arms).    New nodules appear from time to time, but existing nodules may regress spontaneously to leave scars. Nodular prurigo often runs a long course and can lead to significant stress and depression.    How is nodular prurigo diagnosed?  In most cases, the diagnosis is made clinically due to the degree of itch and the typical appearance of the nodules.  A skin biopsy may be useful to confirm the diagnosis of nodular prurigo. Under the microscope, the skin is enormously thickened and may appear quite abnormal, sometimes resembling squamous cell skin cancer. The nerve fibres and nerve endings in the skin are markedly increased in size. The skin is inflamed and there is an increased number of neural mediators known to cause itching and nerve growth.    Direct immunofluorescence looking for antibody deposition in the skin is usually negative. Rarely, the blistering disease bullous pemphigoid can present as nodular prurigo (pemphigoid nodularis). In this case, immunofluorescence reveals immunoglobulins in the basement membrane zone below the epidermis. The prurigo nodules can be present for weeks or months before any blisters  appear.    It is important to identify underlying diseases that are associated with nodular prurigo; blood tests may include full blood count, liver, kidney and thyroid function tests. Patch testing may be worthwhile if contact allergy is considered possible.    What is the treatment for nodular prurigo?  Unfortunately, nodular prurigo is one of the more resistant conditions skin specialists are called upon to treat. Local treatments that may be tried include:  Emollients applied liberally and frequently to cool and soothe itchy skin - menthol or phenol may be added.   Oral antihistamines at night to reduce itch and allow sleep.   Ultrapotent topical steroid creams. To enhance their effect, apply under occlusion; cover with a plastic or hydrocolloid adhesive dressing and leave this in place for several days.   Corticosteroid injections (triamcinolone acetonide 10-40 mg/mL) into thicker nodules.   Coal tar ointment as a steroid alternative.   Calcipotriol ointment (topical vitamin D3), usually used for psoriasis, can be applied twice daily.   Capsaicin cream, which induces itching and burning until eventually, the itch stops completely -- it requires repeated applications four to six times daily.   Cryotherapy, which may shrink the nodules and reduce their itch.   Treatments with pulsed dye laser, which may reduce the vascularity of individual lesions.    Antiseptic or antibiotic ointment may be used on infected nodules, and oral antibiotics (usually flucloxacillin) are indicated for significant secondary bacterial infection (cellulitis).    Systemic treatments that may be helpful for more severe disease are listed below, in no particular order. Combination treatment is frequently recommended.  Phototherapy (UVB and PUVA)   Tricyclic antidepressants such as amitriptyline or doxepin   Anticonvulsants used for neuropathic pain and itch, such as gabapentin or pregabalin   Naltrexone, an opiate antagonist (this  counteracts the narcotic effect of morphine, heroin and similar drugs), has been reported to reduce itching in some subjects.   Oral steroids   Methotrexate   Thalidomide, which is reserved for very severe cases and may be difficult to access.   Ciclosporin, which may reduce the lumps and the itching but its use is limited by side effects.   Systemic retinoids such as acitretin or isotretinoin, which may shrink the nodules and reduce the severity of the itch.         PROCEDURE:  DESTRUCTION OF BENIGN LESIONS WITH CRYOTHERAPY  After a thorough discussion of treatment options and risk/benefits/alternatives (including but not limited to local pain, scarring, dyspigmentation, blistering, and possible superinfection), verbal and written consent were obtained and the aforementioned lesions were treated on with cryotherapy using liquid nitrogen x 1 cycle for 5-10 seconds.      The patient tolerated the procedure well, and after-care instructions were provided.      What is skin cancer?  Skin cancer is unfortunately very common. That's why we are here to help you on your journey to healthy happy skin! There are two main types of skin cancer: melanoma and non-melanoma skin cancer. Melanoma is a form of skin cancer that often arises within an existing nevus or mole. However, this is not always the case. Melanoma can arise anywhere (not only where you have moles right now). Melanoma can run in families, so letting us know about your family history is important. Non-melanoma skin cancer is the most common type of cancer in the [place]. The two main types of non-melanoma skin cancers are basal cell carcinomas (BCC) and squamous cell carcinoma (SCC). These cancers tend to be less aggressive than melanomas but are still important to look for and treat.    What can I do to prevent skin cancer?  One of the largest risk factors for skin cancer is sun exposure or UV radiation. Therefore, sun protection is key! Here are some  great tips for protecting yourself!  Try to avoid direct sun exposure during peak sun hours (10 AM to 2 PM)  Remember you get A LOT of sun even under cloud coverage and through care windows!  When choosing a sunscreen, look for one that says “broad spectrum” sunscreen. This means it protects you from more of the harmful UV rays.   Choose a sunscreen that is SPF 30 or greater for best protection.   Apply sunscreen to all sun-exposed skin and reapply every 2 hours.   Consider sun protective clothing! Great additions to your sun protective clothing wardrobe include broad brimmed hats, sunglasses, UPF clothing.  Avoid tanning salons. These have been shown to be very harmful in terms of your risk of skin cancer.   Avoid “base tans”. We now know that tans are dangerous (not just sun burns). If you want to have a tan for a trip, consider a spray tan!    Should I check my skin at home between my dermatology appointments?  Yes! It's always a great idea to look at your skin on a regular basis. Here are some things to look for when monitoring your skin.   For melanoma, look for the ABCDE's!  A = Asymmetry. Look for a spot where one half does not match the other!  B = Borders. Look for a spot that has jagged, ragged or irregular borders.  C = Color. Look for a spot that is not evenly colored and often includes multiple colors, especially true black, red, white, blue, grey.   D = Diameter. Look for a spot that is larger than the size of a pencil eraser.  E = Evolution. If you ever have a spot that is changing in shape, color, size or symptoms (becomes itchy, painful or starts to bleed), always call us!  For non-melanoma skin cancers, look for a new, pink spot that is not going away, especially one that is itchy, painful or bleeding.     What should I do if I see a spot that is concerning for melanoma or non-melanoma skin cancer?  If you are ever concerned, call us! Do not wait for your next appointment. We want to help!

## 2024-02-02 NOTE — PROGRESS NOTES
"[facility] Note     Patient Name: Melchor Adrian  Encounter Date: 2024     Have you been cared for by a [facility] Dermatologist in the last 3 years and, if so, which description applies to you?    Yes.  I have been here within the last 3 years, and my medical history has NOT changed since that time.  I am MALE/not capable of bearing children.    REVIEW OF SYSTEMS:  Have you recently had or currently have any of the following? No changes in my recent health.   PAST MEDICAL HISTORY:  Have you personally ever had or currently have any of the following?  If \"YES,\" then please provide more detail. No changes in my medical history.   HISTORY OF IMMUNOSUPPRESSION: Do you have a history of any of the following:  Systemic Immunosuppression such as Diabetes, Biologic or Immunotherapy, Chemotherapy, Organ Transplantation, Bone Marrow Transplantation?  No     Answering \"YES\" requires the addition of the dotphrase \"IMMUNOSUPPRESSED\" as the first diagnosis of the patient's visit.   FAMILY HISTORY:  Any \"first degree relatives\" (parent, brother, sister, or child) with the following?    No changes in my family's known health.   PATIENT EXPERIENCE:    Do you want the Dermatologist to perform a COMPLETE skin exam today including a clinical examination under the \"bra and underwear\" areas?  Yes  If necessary, do we have your permission to call and leave a detailed message on your Preferred Phone number that includes your specific medical information?  Yes      Allergies   Allergen Reactions    Atorvastatin Other (See Comments)    Simvastatin Other (See Comments)      Current Outpatient Medications:     amLODIPine-benazepril (LOTREL 2.5-10) 2.5-10 MG per capsule, Take 1 capsule by mouth daily, Disp: , Rfl:     clindamycin (CLEOCIN T) 1 % external solution, Apply topically 2 (two) times a day To face. Melchor Adrian,  1950, # [id number], Disp: 30 mL, Rfl: 2    FLUoxetine (PROzac) 20 mg/5 mL solution, Take by mouth " daily, Disp: , Rfl:     metroNIDAZOLE (METROGEL) 0.75 % gel, Apply topically 2 (two) times a day, Disp: 45 g, Rfl: 2    omeprazole (PriLOSEC) 40 MG capsule, Take 40 mg by mouth daily, Disp: , Rfl:     rosuvastatin (CRESTOR) 10 MG tablet, Take 5 mg by mouth daily, Disp: , Rfl:     simethicone (MYLICON) 80 mg chewable tablet, Chew 80 mg every 6 (six) hours as needed for flatulence, Disp: , Rfl:     triamcinolone (KENALOG) 0.1 % cream, Apply to affected spots twice a day for 2 weeks on the 1 week off., Disp: 80 g, Rfl: 1          Whom besides the patient is providing clinical information about today's encounter?   NO ADDITIONAL HISTORIAN (patient alone provided history)    Physical Exam and Assessment/Plan by Diagnosis:      HISTORY OF BASAL CELL CARCINOMA    Physical Exam:  Anatomic Location Affected:  Left Upper back  Morphological Description of scar:  Well healed surgical scar  Suspected Recurrence: No    Additional History of Present Condition:  History of basal cell carcinoma, excised by Dr. Lopez on 9/20/2023, with no sign of recurrence.    Assessment and Plan:  Based on a thorough discussion of this condition and the management approach to it (including a comprehensive discussion of the known risks, side effects and potential benefits of treatment), the patient (family) agrees to implement the following specific plan:  Monitor for recurrence  When outside we recommend using a wide brim hat, sunglasses, long sleeve and pants, sunscreen with SPF 30+ with reapplication every 2 hours, or SPF specific clothing       How can basal cell carcinoma be prevented?  The most important way to prevent BCC is to avoid sunburn. This is especially important in childhood and early life. Fair skinned individuals and those with a personal or family history of BCC should protect their skin from sun exposure daily, year-round and lifelong.  Stay indoors or under the shade in the middle of the day   Wear covering clothing   Apply  "high protection factor SPF50+ broad-spectrum sunscreens generously to exposed skin if outdoors   Avoid indoor tanning (sun beds, solaria)  Oral nicotinamide (vitamin B3) in a dose of 500 mg twice daily may reduce the number and severity of BCCs.    What is the outlook for basal cell carcinoma?  Most BCCs are cured by treatment. Cure is most likely if treatment is undertaken when the lesion is small.  About 50% of people with BCC develop a second one within 3 years of the first. They are also at increased risk of other skin cancers, especially melanoma. Regular self-skin examinations and long-term annual skin checks by an experienced health professional are recommended.     SCHREIBER ANGIOMAS     Physical Exam:  Anatomic Location Affected:  Trunk and extremities  Morphological Description:  Scattered cherry red papules  Denies pain, itch, bleeding. No treatments tried. Present for years. Present constantly; no modifying factors which make it worse or better.     Assessment and Plan:  Based on a thorough discussion of this condition and the management approach to it (including a comprehensive discussion of the known risks, side effects and potential benefits of treatment), the patient (family) agrees to implement the following specific plan:  Reassure benign        SEBORRHEIC KERATOSIS; NON-INFLAMED     Physical Exam:  Anatomic Location Affected:  Trunk and extremities  Morphological Description:  Waxy, smooth to warty textured, yellow to brownish-grey to dark brown to blackish, discrete, \"stuck-on\" appearing papules.  Present for years. Denies pain, itch, bleeding.      Additional History of Present Condition:  Present constantly; no modifying factors which make it worse or better. No prior treatment.       Assessment and Plan:  Based on a thorough discussion of this condition and the management approach to it (including a comprehensive discussion of the known risks, side effects and potential benefits of treatment), " "the patient (family) agrees to implement the following specific plan:  Reassure benign  Use sun protection.  Apply SPF 30 or higher at least three times a day.  Wear sun protecting clothing and hats.        SOLAR LENTIGINES   OTHER SKIN CHANGES DUE TO CHRONIC EXPOSURE TO NONIONIZING RADIATION     Physical Exam:  Anatomic Location Affected:  Sun exposed areas of back, chest, arms, legs  Morphological Description:  Multiple scattered brown to tan evenly pigmented macules   Denies pain, itch, bleeding. No treatments tried. Present for months - years. Reports getting newer lesions with sun exposure.         Assessment and Plan:  Based on a thorough discussion of this condition and the management approach to it (including a comprehensive discussion of the known risks, side effects and potential benefits of treatment), the patient (family) agrees to implement the following specific plan:  Reassure benign  Use sun protection.  Apply SPF 30 or higher at least three times a day.  Wear sun protecting clothing and hats.         MULTIPLE MELANOCYTIC NEVI (\"Moles\")     Physical Exam:  Anatomic Location Affected: Trunk and extremities  Morphological Description:  Scattered, round to ovoid, symmetrical-appearing, even bordered, skin colored to dark brown macules/papules  Denies pain, itch, bleeding. No treatments tried. Present for years. Present constantly; no modifying factors which make it worse or better. Denies actively changing or growing moles.      Assessment and Plan:  Based on a thorough discussion of this condition and the management approach to it (including a comprehensive discussion of the known risks, side effects and potential benefits of treatment), the patient (family) agrees to implement the following specific plan:  Reassure benign  Monitor for changes  Use sun protection.  Apply SPF 30 or higher at least three times a day.  Wear sun protecting clothing and hats.      PRURIGO NODULE    Physical Exam:  Anatomic " Location Affected:  Left anterolateral forearm  Morphological Description: scaly pink papule       Additional History of Present Condition:  Patient reports lesion has been present for about 6-7 months.    Assessment and Plan:  Based on a thorough discussion of this condition and the management approach to it (including a comprehensive discussion of the known risks, side effects and potential benefits of treatment), the patient (family) agrees to implement the following specific plan:  Cryotherapy performed in the office. Consent obtained.         PROCEDURE:  DESTRUCTION OF BENIGN LESIONS WITH CRYOTHERAPY  After a thorough discussion of treatment options and risk/benefits/alternatives (including but not limited to local pain, scarring, dyspigmentation, blistering, and possible superinfection), verbal and written consent were obtained and the aforementioned lesions were treated on with cryotherapy using liquid nitrogen x 1 cycle for 5-10 seconds.    TOTAL NUMBER of 1 lesions were treated today on the ANATOMIC LOCATION: Left anterolateral forearm.    The patient tolerated the procedure well, and after-care instructions were provided.          Scribe Attestation      I,:  Maic Bosch am acting as a scribe while in the presence of the attending physician.:       I,:  Tito Jaramillo MD personally performed the services described in this documentation    as scribed in my presence.:             Patient was seen and discussed with Dr. Jaramillo.   SHELLY Whalen 02/02/24

## 2024-11-09 ENCOUNTER — OFFICE VISIT (OUTPATIENT)
Dept: URGENT CARE | Facility: CLINIC | Age: 74
End: 2024-11-09
Payer: COMMERCIAL

## 2024-11-09 VITALS
OXYGEN SATURATION: 95 % | SYSTOLIC BLOOD PRESSURE: 142 MMHG | HEART RATE: 92 BPM | DIASTOLIC BLOOD PRESSURE: 86 MMHG | TEMPERATURE: 98.3 F | RESPIRATION RATE: 18 BRPM

## 2024-11-09 DIAGNOSIS — R19.7 DIARRHEA, UNSPECIFIED TYPE: Primary | ICD-10-CM

## 2024-11-09 PROCEDURE — 99213 OFFICE O/P EST LOW 20 MIN: CPT

## 2024-11-09 NOTE — PROGRESS NOTES
[facility]        NAME: Melchor Adrian is a 74 y.o. male  : 1950    MRN: [id number]  DATE: 2024  TIME: 2:56 PM    Assessment and Plan   Diarrhea, unspecified type [R19.7]  1. Diarrhea, unspecified type              Patient Instructions     Avoid use of loperimide or pepto bismol (do not use pepto bismol in children with fevers)  Kaley tea for nausea  Avoid dairy while symptoms persist  Over the counter probiotics   Plenty of fluids (water and pedialyte) and rest  Broths and clear soups  Progress to BRAT Diet (bananas, rice, applesauce, and toast)  Progress to normal diet as tolerated    See PCP in 3-5 days.  Go to ED if symptoms worsen.    Chief Complaint     Chief Complaint   Patient presents with    Fatigue     Verbalizes having [place] on Thurs. States th night started with  n/v/d, chills, and feeling sweaty.   States taking Gatorade, ice tea, and increased water.  Verbalizes not eating since [facility] as [facility] not [facility] for 2 days.          History of Present Illness       The patient presents today with complaints of n/v/d that started on Wed. Earlier in the day on Wed he ate at [employer]. That night he started with GI symptoms, and chills/sweats. The n/v lasted until Thurs and then resolved. He has had diarrhea up until yesterday. He had a BM here in the clinic which was formed. [facility] and was told not to [facility] anything but drink water, Gatorade, or ice tea. He is feeling weak as he has not eaten any food for the past few days, but feels as though his symptoms are improving.         Review of Systems   Review of Systems   Constitutional:  Negative for chills and fever.   HENT:  Negative for congestion.    Respiratory:  Negative for cough.    Gastrointestinal:  Positive for abdominal pain, diarrhea, nausea and vomiting. Negative for abdominal distention and blood in stool.   Genitourinary:  Negative for difficulty urinating.   Musculoskeletal:  Negative  for myalgias.   Skin:  Negative for rash.   Neurological:  Positive for weakness.         Current Medications       Current Outpatient Medications:     amLODIPine-benazepril (LOTREL 2.5-10) 2.5-10 MG per capsule, Take 1 capsule by mouth daily, Disp: , Rfl:     FLUoxetine (PROzac) 20 mg/5 mL solution, Take by mouth daily, Disp: , Rfl:     metroNIDAZOLE (METROGEL) 0.75 % gel, Apply topically 2 (two) times a day, Disp: 45 g, Rfl: 2    omeprazole (PriLOSEC) 40 MG capsule, Take 40 mg by mouth daily, Disp: , Rfl:     rosuvastatin (CRESTOR) 10 MG tablet, Take 5 mg by mouth daily, Disp: , Rfl:     simethicone (MYLICON) 80 mg chewable tablet, Chew 80 mg every 6 (six) hours as needed for flatulence, Disp: , Rfl:     clindamycin (CLEOCIN T) 1 % external solution, Apply topically 2 (two) times a day To face. Melchor Adrian,  1950, # [id number] (Patient not taking: Reported on 2024), Disp: 30 mL, Rfl: 2    triamcinolone (KENALOG) 0.1 % cream, Apply to affected spots twice a day for 2 weeks on the 1 week off. (Patient not taking: Reported on 2024), Disp: 80 g, Rfl: 1    Current Allergies     Allergies as of 2024 - Reviewed 2024   Allergen Reaction Noted    Atorvastatin Other (See Comments) 10/16/2015    Simvastatin Other (See Comments) 01/15/2013            The following portions of the patient's history were reviewed and updated as appropriate: allergies, current medications, past family history, past medical history, past social history, past surgical history and problem list.     Past Medical History:   Diagnosis Date    Basal cell carcinoma     Left Upper back    GERD (gastroesophageal reflux disease)     Hyperlipidemia     Hypertension     PTSD (post-traumatic stress disorder)        Past Surgical History:   Procedure Laterality Date    COLONOSCOPY      KNEE SURGERY      MASS EXCISION N/A 2020    Procedure: EXCISION BIOPSY TISSUE LESION/MASS RIGHT BUTTOCK;  Surgeon: Tez Erickson MD;  Location:  [facility];  Service: General       Family History   Problem Relation Age of Onset    Heart disease Mother     Heart disease Father          Medications have been verified.        Objective   /86   Pulse 92   Temp 98.3 °F (36.8 °C)   Resp 18   SpO2 95%        Physical Exam     Physical Exam  Vitals and nursing note reviewed.   Constitutional:       General: He is not in acute distress.     Appearance: Normal appearance.   HENT:      Head: Normocephalic and atraumatic.      Right Ear: External ear normal.      Left Ear: External ear normal.      Mouth/Throat:      Mouth: Mucous membranes are moist.   Eyes:      Pupils: Pupils are equal, round, and reactive to light.   Cardiovascular:      Rate and Rhythm: Normal rate and regular rhythm.      Heart sounds: Normal heart sounds. No murmur heard.  Pulmonary:      Effort: Pulmonary effort is normal. No respiratory distress.      Breath sounds: Normal breath sounds. No decreased air movement. No decreased breath sounds, wheezing, rhonchi or rales.   Abdominal:      General: There is no distension.      Palpations: Abdomen is soft.      Tenderness: There is generalized abdominal tenderness. There is no guarding or rebound.   Skin:     General: Skin is warm and dry.   Neurological:      Mental Status: He is alert and oriented to person, place, and time. Mental status is at baseline.   Psychiatric:         Mood and Affect: Mood normal.         Behavior: Behavior normal.

## 2024-11-09 NOTE — PATIENT INSTRUCTIONS
Avoid use of loperimide or pepto bismol (do not use pepto bismol in children with fevers)  Kaley tea for nausea  Avoid dairy while symptoms persist  Over the counter probiotics   Plenty of fluids (water and pedialyte) and rest  Broths and clear soups  Progress to BRAT Diet (bananas, rice, applesauce, and toast)  Progress to normal diet as tolerated    See PCP in 3-5 days.  Go to ED if symptoms worsen.    Nutrition Tips for Relief of Diarrhea   WHAT YOU NEED TO KNOW:   There are diet changes you can make to help relieve or stop diarrhea. These changes include limiting or avoiding foods and liquids that are high in sugar, fat, fiber, and lactose. Lactose is a sugar found in milk products. Milk products can cause diarrhea in people who are lactose intolerant. You should also drink extra liquids to replace fluids that are lost when you have diarrhea. Diarrhea can lead to dehydration.   DISCHARGE INSTRUCTIONS:   Foods to limit or avoid:   Dairy:      Whole milk    Half-and-half, cream, and sour cream    Regular (whole milk) ice cream    Grains:      Whole wheat and whole grain breads, pasta, cereals, and crackers    Brown and wild rice    Breads and cereals with seeds or nuts    Popcorn    Fruit and vegetables:      All raw fruits, except bananas and melon    Dried fruits, including prunes and raisins    Canned fruit in heavy syrup    Prune juice and any fruit juice with pulp    Raw vegetables, except lettuce     Fried vegetables    Corn, raw and cooked broccoli, cabbage, cauliflower, and kenroy greens    Protein:      Fried meat, poultry, and fish    High-fat luncheon meats, such as bologna    Fatty meats, such as sausage, cordoba, and hot dogs    Beans and nuts    Liquids:      Sodas and fruit-flavored drinks    Drinks that contain caffeine, such as energy drinks, coffee, and tea     Drinks that contain alcohol or sugar alcohol, such as sorbitol    Foods and liquids you may eat or drink:  Most people can tolerate the  foods and liquids listed below. If any of them make your symptoms worse, stop eating or drinking them until you feel better. If you are lactose intolerant, avoid milk products.  Dairy:      Skim or low-fat milk or evaporated milk    Soy milk or buttermilk     Low-fat, part-skim, and aged cheese    Yogurt, low-fat ice cream, or sherbert    Grains:  (Choose foods with less than 2 grams of dietary fiber per serving.)     White or refined flour breads, bagels, pasta, and crackers    Cold or hot cereals made from white or refined flour such as puffed rice, cornflakes, or cream of wheat    White rice    Fruit and vegetables:      Bananas or melon    Fruit juice without pulp, except prune juice    Canned fruit in juice or light syrup    Lettuce and most well-cooked vegetables without seeds or skins     Strained vegetable juice    Protein:      Tender, well-cooked meat, poultry, or fish    Well-cooked eggs or soy foods (cooked without added fat)    Smooth nut butters    Fats:  (Limit fats to less than 8 teaspoons a day)     Oil, butter, or margarine, or mayonnaise    Cream cheese or salad dressings    Liquids:      For infants, breast milk or formula    Oral rehydration solution     Decaffeinated coffee or caffeine-free teas    Soft drinks without caffeine    Other guidelines to follow:   Drink liquids as directed.  You may need to drink more liquids than usual to prevent dehydration. Ask how much liquid to drink each day and which liquids are best for you. You may need to drink an oral rehydration solution (ORS). An ORS helps replace fluids and electrolytes that you lose when you have diarrhea.    Eat small meals or snacks every 3 to 4 hours  instead of large meals. Continue eating even if you still have diarrhea. Your diarrhea will continue for a few days but should gradually go away.    © Copyright [employer] 2022 Information is for End User's use only and may not be sold, redistributed or otherwise used for  commercial purposes. All illustrations and images included in CareNotes® are the copyrighted property of [employer] or [employer]  The above information is an  only. It is not intended as medical advice for individual conditions or treatments. Talk to your doctor, nurse or pharmacist before following any medical regimen to see if it is safe and effective for you.

## 2024-11-14 ENCOUNTER — ANESTHESIA EVENT (EMERGENCY)
Dept: PERIOP | Facility: HOSPITAL | Age: 74
DRG: 417 | End: 2024-11-14
Payer: COMMERCIAL

## 2024-11-14 ENCOUNTER — APPOINTMENT (EMERGENCY)
Dept: RADIOLOGY | Facility: HOSPITAL | Age: 74
DRG: 417 | End: 2024-11-14
Payer: COMMERCIAL

## 2024-11-14 ENCOUNTER — APPOINTMENT (EMERGENCY)
Dept: CT IMAGING | Facility: HOSPITAL | Age: 74
DRG: 417 | End: 2024-11-14
Payer: COMMERCIAL

## 2024-11-14 ENCOUNTER — APPOINTMENT (EMERGENCY)
Dept: ULTRASOUND IMAGING | Facility: HOSPITAL | Age: 74
DRG: 417 | End: 2024-11-14
Payer: COMMERCIAL

## 2024-11-14 ENCOUNTER — ANESTHESIA (EMERGENCY)
Dept: PERIOP | Facility: HOSPITAL | Age: 74
DRG: 417 | End: 2024-11-14
Payer: COMMERCIAL

## 2024-11-14 ENCOUNTER — HOSPITAL ENCOUNTER (INPATIENT)
Facility: HOSPITAL | Age: 74
LOS: 2 days | Discharge: HOME | DRG: 417 | End: 2024-11-17
Admitting: STUDENT IN AN ORGANIZED HEALTH CARE EDUCATION/TRAINING PROGRAM
Payer: COMMERCIAL

## 2024-11-14 DIAGNOSIS — I10 HYPERTENSION, UNSPECIFIED TYPE: ICD-10-CM

## 2024-11-14 DIAGNOSIS — K81.0 GANGRENOUS CHOLECYSTITIS: ICD-10-CM

## 2024-11-14 DIAGNOSIS — K81.0 ACUTE EMPHYSEMATOUS CHOLECYSTITIS: Primary | ICD-10-CM

## 2024-11-14 DIAGNOSIS — R78.81 POSITIVE BLOOD CULTURE: ICD-10-CM

## 2024-11-14 LAB
ALBUMIN SERPL BCG-MCNC: 3 G/DL (ref 3.5–5)
ALP SERPL-CCNC: 121 U/L (ref 34–104)
ALT SERPL W P-5'-P-CCNC: 105 U/L (ref 7–52)
ANION GAP SERPL CALCULATED.3IONS-SCNC: 11 MMOL/L (ref 4–13)
AST SERPL W P-5'-P-CCNC: 103 U/L (ref 13–39)
ATRIAL RATE: 79 BPM
ATRIAL RATE: 85 BPM
BACTERIA UR QL AUTO: ABNORMAL /HPF
BASOPHILS # BLD MANUAL: 0 THOUSAND/UL (ref 0–0.1)
BASOPHILS NFR MAR MANUAL: 0 % (ref 0–1)
BILIRUB DIRECT SERPL-MCNC: 0.33 MG/DL (ref 0–0.2)
BILIRUB SERPL-MCNC: 1.92 MG/DL (ref 0.2–1)
BILIRUB UR QL STRIP: NEGATIVE
BUN SERPL-MCNC: 22 MG/DL (ref 5–25)
CALCIUM SERPL-MCNC: 7.8 MG/DL (ref 8.4–10.2)
CHLORIDE SERPL-SCNC: 99 MMOL/L (ref 96–108)
CLARITY UR: CLEAR
CO2 SERPL-SCNC: 25 MMOL/L (ref 21–32)
COLOR UR: YELLOW
CREAT SERPL-MCNC: 1.23 MG/DL (ref 0.6–1.3)
EOSINOPHIL # BLD MANUAL: 0.18 THOUSAND/UL (ref 0–0.4)
EOSINOPHIL NFR BLD MANUAL: 1 % (ref 0–6)
ERYTHROCYTE [DISTWIDTH] IN BLOOD BY AUTOMATED COUNT: 14.3 % (ref 11.6–15.1)
GFR SERPL CREATININE-BSD FRML MDRD: 57 ML/MIN/1.73SQ M
GLUCOSE SERPL-MCNC: 116 MG/DL (ref 65–140)
GLUCOSE UR STRIP-MCNC: NEGATIVE MG/DL
HCT VFR BLD AUTO: 45.8 % (ref 36.5–49.3)
HGB BLD-MCNC: 14.7 G/DL (ref 12–17)
HGB UR QL STRIP.AUTO: ABNORMAL
KETONES UR STRIP-MCNC: NEGATIVE MG/DL
LACTATE SERPL-SCNC: 1 MMOL/L (ref 0.5–2)
LEUKOCYTE ESTERASE UR QL STRIP: NEGATIVE
LG PLATELETS BLD QL SMEAR: PRESENT
LYMPHOCYTES # BLD AUTO: 12 % (ref 14–44)
LYMPHOCYTES # BLD AUTO: 2.7 THOUSAND/UL (ref 0.6–4.47)
MAGNESIUM SERPL-MCNC: 2.2 MG/DL (ref 1.9–2.7)
MCH RBC QN AUTO: 27.4 PG (ref 26.8–34.3)
MCHC RBC AUTO-ENTMCNC: 32.1 G/DL (ref 31.4–37.4)
MCV RBC AUTO: 85 FL (ref 82–98)
MONOCYTES # BLD AUTO: 1.08 THOUSAND/UL (ref 0–1.22)
MONOCYTES NFR BLD: 6 % (ref 4–12)
NEUTROPHILS # BLD MANUAL: 14.05 THOUSAND/UL (ref 1.85–7.62)
NEUTS BAND NFR BLD MANUAL: 2 % (ref 0–8)
NEUTS SEG NFR BLD AUTO: 76 % (ref 43–75)
NITRITE UR QL STRIP: NEGATIVE
NON-SQ EPI CELLS URNS QL MICRO: ABNORMAL /HPF
P AXIS: 11 DEGREES
P AXIS: 4 DEGREES
PH UR STRIP.AUTO: 6 [PH]
PLATELET # BLD AUTO: 367 THOUSANDS/UL (ref 149–390)
PLATELET BLD QL SMEAR: ADEQUATE
PMV BLD AUTO: 10.5 FL (ref 8.9–12.7)
POTASSIUM SERPL-SCNC: 3.4 MMOL/L (ref 3.5–5.3)
PR INTERVAL: 128 MS
PR INTERVAL: 136 MS
PROT SERPL-MCNC: 6 G/DL (ref 6.4–8.4)
PROT UR STRIP-MCNC: ABNORMAL MG/DL
QRS AXIS: 50 DEGREES
QRS AXIS: 52 DEGREES
QRSD INTERVAL: 76 MS
QRSD INTERVAL: 76 MS
QT INTERVAL: 228 MS
QT INTERVAL: 376 MS
QTC INTERVAL: 261 MS
QTC INTERVAL: 447 MS
RBC # BLD AUTO: 5.36 MILLION/UL (ref 3.88–5.62)
RBC #/AREA URNS AUTO: ABNORMAL /HPF
RBC MORPH BLD: NORMAL
SODIUM SERPL-SCNC: 135 MMOL/L (ref 135–147)
SP GR UR STRIP.AUTO: >=1.05 (ref 1–1.03)
T WAVE AXIS: -41 DEGREES
T WAVE AXIS: 257 DEGREES
UROBILINOGEN UR STRIP-ACNC: 4 MG/DL
VARIANT LYMPHS # BLD AUTO: 3 %
VENTRICULAR RATE: 79 BPM
VENTRICULAR RATE: 85 BPM
WBC # BLD AUTO: 18.01 THOUSAND/UL (ref 4.31–10.16)
WBC #/AREA URNS AUTO: ABNORMAL /HPF

## 2024-11-14 PROCEDURE — 87154 CUL TYP ID BLD PTHGN 6+ TRGT: CPT

## 2024-11-14 PROCEDURE — 0FT44ZZ RESECTION OF GALLBLADDER, PERCUTANEOUS ENDOSCOPIC APPROACH: ICD-10-PCS | Performed by: STUDENT IN AN ORGANIZED HEALTH CARE EDUCATION/TRAINING PROGRAM

## 2024-11-14 PROCEDURE — 87076 CULTURE ANAEROBE IDENT EACH: CPT

## 2024-11-14 PROCEDURE — 87181 SC STD AGAR DILUTION PER AGT: CPT

## 2024-11-14 PROCEDURE — 83735 ASSAY OF MAGNESIUM: CPT

## 2024-11-14 PROCEDURE — 85007 BL SMEAR W/DIFF WBC COUNT: CPT

## 2024-11-14 PROCEDURE — 80048 BASIC METABOLIC PNL TOTAL CA: CPT

## 2024-11-14 PROCEDURE — 96375 TX/PRO/DX INJ NEW DRUG ADDON: CPT

## 2024-11-14 PROCEDURE — 99285 EMERGENCY DEPT VISIT HI MDM: CPT

## 2024-11-14 PROCEDURE — 96368 THER/DIAG CONCURRENT INF: CPT

## 2024-11-14 PROCEDURE — 99223 1ST HOSP IP/OBS HIGH 75: CPT | Performed by: STUDENT IN AN ORGANIZED HEALTH CARE EDUCATION/TRAINING PROGRAM

## 2024-11-14 PROCEDURE — 74177 CT ABD & PELVIS W/CONTRAST: CPT

## 2024-11-14 PROCEDURE — 76705 ECHO EXAM OF ABDOMEN: CPT

## 2024-11-14 PROCEDURE — 99284 EMERGENCY DEPT VISIT MOD MDM: CPT

## 2024-11-14 PROCEDURE — 96366 THER/PROPH/DIAG IV INF ADDON: CPT

## 2024-11-14 PROCEDURE — 87040 BLOOD CULTURE FOR BACTERIA: CPT

## 2024-11-14 PROCEDURE — 81001 URINALYSIS AUTO W/SCOPE: CPT

## 2024-11-14 PROCEDURE — 36415 COLL VENOUS BLD VENIPUNCTURE: CPT

## 2024-11-14 PROCEDURE — 96365 THER/PROPH/DIAG IV INF INIT: CPT

## 2024-11-14 PROCEDURE — 88304 TISSUE EXAM BY PATHOLOGIST: CPT | Performed by: PATHOLOGY

## 2024-11-14 PROCEDURE — 47562 LAPAROSCOPIC CHOLECYSTECTOMY: CPT | Performed by: PHYSICIAN ASSISTANT

## 2024-11-14 PROCEDURE — 83605 ASSAY OF LACTIC ACID: CPT

## 2024-11-14 PROCEDURE — 80076 HEPATIC FUNCTION PANEL: CPT

## 2024-11-14 PROCEDURE — 71045 X-RAY EXAM CHEST 1 VIEW: CPT

## 2024-11-14 PROCEDURE — 93005 ELECTROCARDIOGRAM TRACING: CPT

## 2024-11-14 PROCEDURE — 87205 SMEAR GRAM STAIN: CPT | Performed by: STUDENT IN AN ORGANIZED HEALTH CARE EDUCATION/TRAINING PROGRAM

## 2024-11-14 PROCEDURE — 93010 ELECTROCARDIOGRAM REPORT: CPT | Performed by: INTERNAL MEDICINE

## 2024-11-14 PROCEDURE — 47562 LAPAROSCOPIC CHOLECYSTECTOMY: CPT | Performed by: STUDENT IN AN ORGANIZED HEALTH CARE EDUCATION/TRAINING PROGRAM

## 2024-11-14 PROCEDURE — 87076 CULTURE ANAEROBE IDENT EACH: CPT | Performed by: STUDENT IN AN ORGANIZED HEALTH CARE EDUCATION/TRAINING PROGRAM

## 2024-11-14 PROCEDURE — 85027 COMPLETE CBC AUTOMATED: CPT

## 2024-11-14 PROCEDURE — 80074 ACUTE HEPATITIS PANEL: CPT

## 2024-11-14 PROCEDURE — 87075 CULTR BACTERIA EXCEPT BLOOD: CPT | Performed by: STUDENT IN AN ORGANIZED HEALTH CARE EDUCATION/TRAINING PROGRAM

## 2024-11-14 PROCEDURE — 87070 CULTURE OTHR SPECIMN AEROBIC: CPT | Performed by: STUDENT IN AN ORGANIZED HEALTH CARE EDUCATION/TRAINING PROGRAM

## 2024-11-14 RX ORDER — CEFAZOLIN SODIUM 2 G/50ML
2000 SOLUTION INTRAVENOUS
Status: COMPLETED | OUTPATIENT
Start: 2024-11-15 | End: 2024-11-14

## 2024-11-14 RX ORDER — DEXAMETHASONE SODIUM PHOSPHATE 10 MG/ML
INJECTION, SOLUTION INTRAMUSCULAR; INTRAVENOUS AS NEEDED
Status: DISCONTINUED | OUTPATIENT
Start: 2024-11-14 | End: 2024-11-14

## 2024-11-14 RX ORDER — HEPARIN SODIUM 5000 [USP'U]/ML
5000 INJECTION, SOLUTION INTRAVENOUS; SUBCUTANEOUS EVERY 8 HOURS SCHEDULED
Status: DISCONTINUED | OUTPATIENT
Start: 2024-11-14 | End: 2024-11-17 | Stop reason: HOSPADM

## 2024-11-14 RX ORDER — METRONIDAZOLE 500 MG/100ML
500 INJECTION, SOLUTION INTRAVENOUS
Status: COMPLETED | OUTPATIENT
Start: 2024-11-15 | End: 2024-11-14

## 2024-11-14 RX ORDER — OXYCODONE HYDROCHLORIDE 5 MG/1
5 TABLET ORAL EVERY 4 HOURS PRN
Status: DISCONTINUED | OUTPATIENT
Start: 2024-11-14 | End: 2024-11-17 | Stop reason: HOSPADM

## 2024-11-14 RX ORDER — FENTANYL CITRATE/PF 50 MCG/ML
50 SYRINGE (ML) INJECTION
Status: DISCONTINUED | OUTPATIENT
Start: 2024-11-14 | End: 2024-11-14 | Stop reason: HOSPADM

## 2024-11-14 RX ORDER — MAGNESIUM HYDROXIDE 1200 MG/15ML
LIQUID ORAL AS NEEDED
Status: DISCONTINUED | OUTPATIENT
Start: 2024-11-14 | End: 2024-11-14 | Stop reason: HOSPADM

## 2024-11-14 RX ORDER — HYDROMORPHONE HCL/PF 1 MG/ML
SYRINGE (ML) INJECTION AS NEEDED
Status: DISCONTINUED | OUTPATIENT
Start: 2024-11-14 | End: 2024-11-14

## 2024-11-14 RX ORDER — PROPOFOL 10 MG/ML
INJECTION, EMULSION INTRAVENOUS AS NEEDED
Status: DISCONTINUED | OUTPATIENT
Start: 2024-11-14 | End: 2024-11-14

## 2024-11-14 RX ORDER — ONDANSETRON 2 MG/ML
4 INJECTION INTRAMUSCULAR; INTRAVENOUS ONCE
Status: COMPLETED | OUTPATIENT
Start: 2024-11-14 | End: 2024-11-14

## 2024-11-14 RX ORDER — BUPIVACAINE HYDROCHLORIDE 2.5 MG/ML
INJECTION, SOLUTION EPIDURAL; INFILTRATION; INTRACAUDAL AS NEEDED
Status: DISCONTINUED | OUTPATIENT
Start: 2024-11-14 | End: 2024-11-14 | Stop reason: HOSPADM

## 2024-11-14 RX ORDER — PANTOPRAZOLE SODIUM 40 MG/10ML
40 INJECTION, POWDER, LYOPHILIZED, FOR SOLUTION INTRAVENOUS
Status: DISCONTINUED | OUTPATIENT
Start: 2024-11-14 | End: 2024-11-15

## 2024-11-14 RX ORDER — ONDANSETRON 2 MG/ML
INJECTION INTRAMUSCULAR; INTRAVENOUS AS NEEDED
Status: DISCONTINUED | OUTPATIENT
Start: 2024-11-14 | End: 2024-11-14

## 2024-11-14 RX ORDER — FENTANYL CITRATE 50 UG/ML
INJECTION, SOLUTION INTRAMUSCULAR; INTRAVENOUS AS NEEDED
Status: DISCONTINUED | OUTPATIENT
Start: 2024-11-14 | End: 2024-11-14

## 2024-11-14 RX ORDER — ROCURONIUM BROMIDE 10 MG/ML
INJECTION, SOLUTION INTRAVENOUS AS NEEDED
Status: DISCONTINUED | OUTPATIENT
Start: 2024-11-14 | End: 2024-11-14

## 2024-11-14 RX ORDER — SODIUM CHLORIDE, SODIUM GLUCONATE, SODIUM ACETATE, POTASSIUM CHLORIDE, MAGNESIUM CHLORIDE, SODIUM PHOSPHATE, DIBASIC, AND POTASSIUM PHOSPHATE .53; .5; .37; .037; .03; .012; .00082 G/100ML; G/100ML; G/100ML; G/100ML; G/100ML; G/100ML; G/100ML
1000 INJECTION, SOLUTION INTRAVENOUS ONCE
Status: COMPLETED | OUTPATIENT
Start: 2024-11-14 | End: 2024-11-14

## 2024-11-14 RX ORDER — SODIUM CHLORIDE, SODIUM LACTATE, POTASSIUM CHLORIDE, CALCIUM CHLORIDE 600; 310; 30; 20 MG/100ML; MG/100ML; MG/100ML; MG/100ML
125 INJECTION, SOLUTION INTRAVENOUS CONTINUOUS
Status: DISCONTINUED | OUTPATIENT
Start: 2024-11-14 | End: 2024-11-15

## 2024-11-14 RX ORDER — HYDROMORPHONE HCL/PF 1 MG/ML
0.5 SYRINGE (ML) INJECTION
Status: DISCONTINUED | OUTPATIENT
Start: 2024-11-14 | End: 2024-11-14 | Stop reason: HOSPADM

## 2024-11-14 RX ORDER — HYDROMORPHONE HCL/PF 1 MG/ML
0.5 SYRINGE (ML) INJECTION
Status: DISCONTINUED | OUTPATIENT
Start: 2024-11-14 | End: 2024-11-14

## 2024-11-14 RX ORDER — ACETAMINOPHEN 325 MG/1
650 TABLET ORAL EVERY 6 HOURS PRN
Status: DISCONTINUED | OUTPATIENT
Start: 2024-11-14 | End: 2024-11-17 | Stop reason: HOSPADM

## 2024-11-14 RX ORDER — ONDANSETRON 2 MG/ML
4 INJECTION INTRAMUSCULAR; INTRAVENOUS ONCE AS NEEDED
Status: DISCONTINUED | OUTPATIENT
Start: 2024-11-14 | End: 2024-11-14 | Stop reason: HOSPADM

## 2024-11-14 RX ORDER — TRAMADOL HYDROCHLORIDE 50 MG/1
50 TABLET ORAL EVERY 6 HOURS PRN
Status: DISCONTINUED | OUTPATIENT
Start: 2024-11-14 | End: 2024-11-17 | Stop reason: HOSPADM

## 2024-11-14 RX ORDER — HYDROMORPHONE HCL/PF 1 MG/ML
0.2 SYRINGE (ML) INJECTION EVERY 2 HOUR PRN
Status: DISCONTINUED | OUTPATIENT
Start: 2024-11-14 | End: 2024-11-17 | Stop reason: HOSPADM

## 2024-11-14 RX ORDER — ONDANSETRON 2 MG/ML
4 INJECTION INTRAMUSCULAR; INTRAVENOUS EVERY 6 HOURS PRN
Status: DISCONTINUED | OUTPATIENT
Start: 2024-11-14 | End: 2024-11-17 | Stop reason: HOSPADM

## 2024-11-14 RX ORDER — LIDOCAINE HYDROCHLORIDE 10 MG/ML
INJECTION, SOLUTION EPIDURAL; INFILTRATION; INTRACAUDAL; PERINEURAL AS NEEDED
Status: DISCONTINUED | OUTPATIENT
Start: 2024-11-14 | End: 2024-11-14

## 2024-11-14 RX ADMIN — ROCURONIUM BROMIDE 10 MG: 10 INJECTION, SOLUTION INTRAVENOUS at 16:23

## 2024-11-14 RX ADMIN — PROPOFOL 150 MG: 10 INJECTION, EMULSION INTRAVENOUS at 15:40

## 2024-11-14 RX ADMIN — SUGAMMADEX 200 MG: 100 INJECTION, SOLUTION INTRAVENOUS at 17:05

## 2024-11-14 RX ADMIN — FENTANYL CITRATE 50 MCG: 50 INJECTION INTRAMUSCULAR; INTRAVENOUS at 17:17

## 2024-11-14 RX ADMIN — HEPARIN SODIUM 5000 UNITS: 5000 INJECTION INTRAVENOUS; SUBCUTANEOUS at 22:51

## 2024-11-14 RX ADMIN — PIPERACILLIN AND TAZOBACTAM 4.5 G: 36; 4.5 INJECTION, POWDER, FOR SOLUTION INTRAVENOUS at 11:32

## 2024-11-14 RX ADMIN — SODIUM CHLORIDE, SODIUM LACTATE, POTASSIUM CHLORIDE, AND CALCIUM CHLORIDE: .6; .31; .03; .02 INJECTION, SOLUTION INTRAVENOUS at 15:33

## 2024-11-14 RX ADMIN — METRONIDAZOLE: 500 INJECTION, SOLUTION INTRAVENOUS at 15:46

## 2024-11-14 RX ADMIN — ONDANSETRON 4 MG: 2 INJECTION INTRAMUSCULAR; INTRAVENOUS at 15:45

## 2024-11-14 RX ADMIN — SODIUM CHLORIDE, SODIUM LACTATE, POTASSIUM CHLORIDE, AND CALCIUM CHLORIDE: .6; .31; .03; .02 INJECTION, SOLUTION INTRAVENOUS at 17:00

## 2024-11-14 RX ADMIN — DEXAMETHASONE SODIUM PHOSPHATE 10 MG: 10 INJECTION INTRAMUSCULAR; INTRAVENOUS at 15:50

## 2024-11-14 RX ADMIN — IOHEXOL 100 ML: 350 INJECTION, SOLUTION INTRAVENOUS at 10:57

## 2024-11-14 RX ADMIN — ONDANSETRON 4 MG: 2 INJECTION INTRAMUSCULAR; INTRAVENOUS at 09:25

## 2024-11-14 RX ADMIN — PROPOFOL 50 MG: 10 INJECTION, EMULSION INTRAVENOUS at 17:00

## 2024-11-14 RX ADMIN — PROPOFOL 50 MG: 10 INJECTION, EMULSION INTRAVENOUS at 17:12

## 2024-11-14 RX ADMIN — LIDOCAINE HYDROCHLORIDE 50 MG: 10 INJECTION, SOLUTION EPIDURAL; INFILTRATION; INTRACAUDAL at 15:40

## 2024-11-14 RX ADMIN — FENTANYL CITRATE 50 MCG: 50 INJECTION INTRAMUSCULAR; INTRAVENOUS at 15:40

## 2024-11-14 RX ADMIN — HEPARIN SODIUM 5000 UNITS: 5000 INJECTION INTRAVENOUS; SUBCUTANEOUS at 15:53

## 2024-11-14 RX ADMIN — FENTANYL CITRATE 50 MCG: 50 INJECTION INTRAMUSCULAR; INTRAVENOUS at 17:12

## 2024-11-14 RX ADMIN — ROCURONIUM BROMIDE 50 MG: 10 INJECTION, SOLUTION INTRAVENOUS at 15:41

## 2024-11-14 RX ADMIN — PIPERACILLIN AND TAZOBACTAM 4.5 G: 36; 4.5 INJECTION, POWDER, FOR SOLUTION INTRAVENOUS at 22:45

## 2024-11-14 RX ADMIN — SODIUM CHLORIDE, SODIUM GLUCONATE, SODIUM ACETATE, POTASSIUM CHLORIDE, MAGNESIUM CHLORIDE, SODIUM PHOSPHATE, DIBASIC, AND POTASSIUM PHOSPHATE 1000 ML: .53; .5; .37; .037; .03; .012; .00082 INJECTION, SOLUTION INTRAVENOUS at 09:26

## 2024-11-14 RX ADMIN — SODIUM CHLORIDE, SODIUM LACTATE, POTASSIUM CHLORIDE, AND CALCIUM CHLORIDE 125 ML/HR: .6; .31; .03; .02 INJECTION, SOLUTION INTRAVENOUS at 20:50

## 2024-11-14 RX ADMIN — CEFAZOLIN SODIUM 2000 MG: 2 SOLUTION INTRAVENOUS at 15:35

## 2024-11-14 RX ADMIN — FENTANYL CITRATE 50 MCG: 50 INJECTION INTRAMUSCULAR; INTRAVENOUS at 15:55

## 2024-11-14 RX ADMIN — HYDROMORPHONE HYDROCHLORIDE 0.5 MG: 1 INJECTION, SOLUTION INTRAMUSCULAR; INTRAVENOUS; SUBCUTANEOUS at 16:10

## 2024-11-14 NOTE — ED PROVIDER NOTES
Time reflects when diagnosis was documented in both MDM as applicable and the Disposition within this note       Time User Action Codes Description Comment    11/14/2024 11:58 AM Serene, Law Kimball [K81.0] Acute emphysematous cholecystitis     11/14/2024 11:59 AM Serene Law Add [K81.0] Gangrenous cholecystitis     11/14/2024  3:12 PM Guera Toro Rehana [I10] Hypertension, unspecified type     11/14/2024  4:06 PM Keith Mueller [K81.0] Acute emphysematous cholecystitis     11/14/2024  4:06 PM Kieth Mueller [K81.0] Gangrenous cholecystitis           ED Disposition       ED Disposition   Admit    Condition   Stable    Date/Time   Thu Nov 14, 2024 11:58 AM    Comment   --             Assessment & Plan       Medical Decision Making  Patient is a 74-year-old male presenting to the emergency department with complaint of abdominal pain, lack of appetite, generalized fatigue, in the context of recent multiple episodes of nausea, vomiting, watery diarrhea after eating [employer] several days ago.  History clinical exam documented below.  Differential diagnosis includes but is not limited to dehydration related to recent gastroenteritis, biliary colic, gastritis, pancreatitis, colitis, arrhythmia, or other concerning abdominal pathology.    Labs ordered and reviewed with the patient.  He remains hemodynamically stable and is afebrile.  Patient offered pain medication but declined. Fluids administered.  Upon reviewing patient's CT, which was concerning for emphysematous cholecystitis, I consulted surgical service prior to the formal radiology read being resulted.  Surgery will evaluate the patient and likely take him to the OR at some point today. I started Zosyn therapy.  An ultrasound of the right upper quadrant was also ordered for completion.  A lactic acid and blood cultures were added upon learning emphysematous cholecystitis diagnosis to evaluate for sepsis.  I reviewed the patient's CT scan and  results in detail, and patient is agreeable with the plan for OR intervention likely today.  Surgical service evaluated the patient at bedside and will arrange for admission and subsequent intervention.    Amount and/or Complexity of Data Reviewed  Labs: ordered.  Radiology: ordered and independent interpretation performed.    Risk  Prescription drug management.  Decision regarding hospitalization.        ED Course as of 11/15/24 0943   Thu Nov 14, 2024   0904 EKG shows normal sinus rhythm at 79 bpm, normal axis, normal intervals, no acute ischemic changes as interpreted by me.   0951 CXR negative for any acute cardiopulmonary disease as interpreted by me      1110 Reviewed CT; concerning for emphysematous cholecystitis. Zosyn ordered. Added lactic acid and blood cultures. I contacted surgical service; awaiting response.     1145 IMPRESSION:     Findings consistent with emphysematous/gangrenous cholecystitis complicated by perforation as evidenced by a 5.6 x 5.5 x 3.8 cm collection contiguous with the medial gallbladder wall and pneumoperitoneum.        1150 Discussed CT findings with patient.  He remains hemodynamically stable at this time.  He does not have that much pain in his abdomen.  Offered pain medication but declined.  I discussed with him that he would likely have to have a procedure on his gallbladder given his CT finding.  Patient agreeable with the plan.  He will make phone calls to family members to inform them.       Medications   piperacillin-tazobactam (ZOSYN) IVPB (EXTENDED INFUSION) 4.5 g (has no administration in time range)   multi-electrolyte (ISOLYTE-S PH 7.4) bolus 1,000 mL ( Intravenous Restarted 11/14/24 1027)   ondansetron (ZOFRAN) injection 4 mg (4 mg Intravenous Given 11/14/24 0925)   iohexol (OMNIPAQUE) 350 MG/ML injection (MULTI-DOSE) 100 mL (100 mL Intravenous Given 11/14/24 1057)   piperacillin-tazobactam (ZOSYN) IVPB 4.5 g (0 g Intravenous Stopped 11/14/24 1202)       ED Risk Strat  "Scores                           SBIRT 22yo+      Flowsheet Row Most Recent Value   Initial Alcohol Screen: US AUDIT-C     1. How often do you have a drink containing alcohol? 0 Filed at: 11/14/2024 0903   2. How many drinks containing alcohol do you have on a typical day you are drinking?  0 Filed at: 11/14/2024 0903   3a. Male UNDER 65: How often do you have five or more drinks on one occasion? 0 Filed at: 11/14/2024 0903   Audit-C Score 0 Filed at: 11/14/2024 0903   MANUELA: How many times in the past year have you...    Used an illegal drug or used a prescription medication for non-medical reasons? Never Filed at: 11/14/2024 0903                            History of Present Illness       Chief Complaint   Patient presents with    Abdominal Pain     R side abdominal pain that shoots up to R shoulder along w/ N/V/D x4 days. PT states 'It hurts to breath when the pain comes on.\" Was seen recently for the same.        Past Medical History:   Diagnosis Date    Basal cell carcinoma     Left Upper back    GERD (gastroesophageal reflux disease)     Hyperlipidemia     Hypertension     PTSD (post-traumatic stress disorder)       Past Surgical History:   Procedure Laterality Date    CHOLECYSTECTOMY LAPAROSCOPIC N/A 11/14/2024    Procedure: CHOLECYSTECTOMY LAPAROSCOPIC;  Surgeon: Law Lew DO;  Location: [facility];  Service: General    COLONOSCOPY      KNEE SURGERY      MASS EXCISION N/A 5/27/2020    Procedure: EXCISION BIOPSY TISSUE LESION/MASS RIGHT BUTTOCK;  Surgeon: Tez Erickson MD;  Location: [facility];  Service: General      Family History   Problem Relation Age of Onset    Heart disease Mother     Heart disease Father       Social History     Tobacco Use    Smoking status: Former    Smokeless tobacco: Never   Vaping Use    Vaping status: Never Used   Substance Use Topics    Alcohol use: Not Currently    Drug use: Never      E-Cigarette/Vaping    E-Cigarette Use Never User       E-Cigarette/Vaping " Substances    Nicotine No     THC No     CBD No     Flavoring No     Other No     Unknown No       I have reviewed and agree with the history as documented.     Patient is a 74-year-old male with past medical history of hypertension, hyperlipidemia, GERD, presenting to the ED for evaluation of abdominal pain, generalized fatigue, and lack of appetite.  Patient states about 6 days ago he had a meal at [employer], which was closely followed by onset of nausea, vomiting, abdominal cramping and watery diarrhea.  Patient states that he had diarrhea for about 2 days before presenting to urgent care on 11/9/2024.  Patient was provided with instructions for supportive care for gastroenteritis.  Shortly after urgent care visit, patient states that the diarrhea did stop, but he did have continued abdominal pain.  Patient notes discomfort in the upper and lower abdomen, yesterday experiencing a sharp pain in the mid abdomen with radiation to the right upper quadrant and to the right shoulder area.  Patient notes that the pain is intermittent, coming and strong waves at times.  He has been unable to tolerate anything p.o. for the last 2 days secondary to nausea and the abdominal pain.  His last bowel movement was 2 days ago.  He denies any vomiting, fevers, skin color changes, headache.  At times, patient does feel dizzy and lightheaded with exertion, but also attributes this to dehydration as he has not really had anything to drink in 2 days.    Denies fevers, chills, cough or congestions, chest pain, difficulty breathing, vomiting, urinary complaints, bloody stools.        Review of Systems   All other systems reviewed and are negative.          Objective       ED Triage Vitals [11/14/24 0901]   Temperature Pulse Blood Pressure Respirations SpO2 Patient Position - Orthostatic VS   98.3 °F (36.8 °C) 80 126/80 20 96 % Lying      Temp Source Heart Rate Source BP Location FiO2 (%) Pain Score    Oral Monitor Right arm -- 5       Vitals      Date and Time Temp Pulse SpO2 Resp BP Pain Score FACES Pain Rating User   11/15/24 0918 -- -- -- -- -- No Pain -- MB   11/15/24 0659 97.4 °F (36.3 °C) 64 91 % -- 112/70 -- -- DII   11/15/24 0452 -- 66 91 % -- 126/72 -- -- DII   11/14/24 2216 97.3 °F (36.3 °C) 65 94 % 17 119/73 -- -- DII   11/14/24 2044 -- -- 92 % -- -- 2 discomfort -- VS   11/14/24 1912 97.4 °F (36.3 °C) 70 92 % -- 109/70 -- -- DII   11/14/24 1838 -- 68 91 % 15 120/75 -- -- DII   11/14/24 1815 -- 67 94 % 16 100/58 No Pain -- PH   11/14/24 1800 98.1 °F (36.7 °C) 69 93 % 17 100/62 No Pain -- PH   11/14/24 1745 -- 74 90 % 19 101/60 No Pain -- PH   11/14/24 1730 98.3 °F (36.8 °C) 78 90 % 18 110/62 No Pain -- PH   11/14/24 1407 97.4 °F (36.3 °C) 77 94 % 18 110/66 3 -- AM   11/14/24 1315 -- 73 94 % 18 -- -- -- AM   11/14/24 1300 -- 75 93 % 19 101/57 -- -- SG   11/14/24 1145 -- 77 95 % 22 122/60 -- -- AM   11/14/24 1030 -- 67 94 % 22 117/71 -- -- AM   11/14/24 1000 -- 77 92 % 18 119/77 -- -- AM   11/14/24 0930 -- 83 98 % 18 158/78 -- -- SN   11/14/24 0901 98.3 °F (36.8 °C) 80 96 % 20 126/80 5 -- SG            Physical Exam  Vitals and nursing note reviewed.   Constitutional:       General: He is not in acute distress.     Appearance: He is well-developed and normal weight. He is not ill-appearing, toxic-appearing or diaphoretic.   HENT:      Head: Normocephalic and atraumatic.      Mouth/Throat:      Mouth: Mucous membranes are moist.   Eyes:      General: No scleral icterus.     Conjunctiva/sclera: Conjunctivae normal.   Cardiovascular:      Rate and Rhythm: Normal rate and regular rhythm.      Heart sounds: Normal heart sounds. No murmur heard.  Pulmonary:      Effort: Pulmonary effort is normal. No respiratory distress.      Breath sounds: Normal breath sounds. No wheezing, rhonchi or rales.   Chest:      Chest wall: No tenderness.   Abdominal:      General: Abdomen is flat. Bowel sounds are normal.      Palpations: Abdomen is soft.  There is no mass.      Tenderness: There is abdominal tenderness in the right upper quadrant, periumbilical area and left lower quadrant. There is no guarding or rebound. Positive signs include Peres's sign. Negative signs include Rovsing's sign and McBurney's sign.      Hernia: No hernia is present.   Musculoskeletal:         General: No swelling.      Cervical back: Neck supple.   Skin:     General: Skin is warm and dry.      Capillary Refill: Capillary refill takes less than 2 seconds.      Coloration: Skin is not jaundiced or pale.   Neurological:      General: No focal deficit present.      Mental Status: He is alert and oriented to person, place, and time.   Psychiatric:         Mood and Affect: Mood normal.         Results Reviewed       Procedure Component Value Units Date/Time    Blood culture [[id number]] Collected: 11/14/24 1127    Lab Status: Preliminary result Specimen: Blood from Arm, Right Updated: 11/14/24 1731     Blood Culture Received in Microbiology Lab. Culture in Progress.    Blood culture [[id number]] Collected: 11/14/24 1132    Lab Status: Preliminary result Specimen: Blood from Arm, Left Updated: 11/14/24 1701     Blood Culture Received in Microbiology Lab. Culture in Progress.    Urine Microscopic [[id number]]  (Abnormal) Collected: 11/14/24 1202    Lab Status: Final result Specimen: Urine, Clean Catch Updated: 11/14/24 1224     RBC, UA 2-4 /hpf      WBC, UA 1-2 /hpf      Epithelial Cells Occasional /hpf      Bacteria, UA None Seen /hpf     UA w Reflex to Microscopic w Reflex to Culture [[id number]]  (Abnormal) Collected: 11/14/24 1202    Lab Status: Final result Specimen: Urine, Clean Catch Updated: 11/14/24 1221     Color, UA Yellow     Clarity, UA Clear     Specific Gravity, UA >=1.050     pH, UA 6.0     Leukocytes, UA Negative     Nitrite, UA Negative     Protein, UA 30 (1+) mg/dl      Glucose, UA Negative mg/dl      Ketones, UA Negative mg/dl      Urobilinogen, UA 4.0 mg/dl       Bilirubin, UA Negative     Occult Blood, UA Small    Lactic acid, plasma (w/reflex if result > 2.0) [[id number]]  (Normal) Collected: 11/14/24 1132    Lab Status: Final result Specimen: Blood from Arm, Right Updated: 11/14/24 1208     LACTIC ACID 1.0 mmol/L     Narrative:      Result may be elevated if tourniquet was used during collection.    Hepatitis panel, acute [[id number]] Collected: 11/14/24 1109    Lab Status: In process Specimen: Blood from Arm, Right Updated: 11/14/24 1111    Magnesium [[id number]]  (Normal) Collected: 11/14/24 1011    Lab Status: Final result Specimen: Blood from Arm, Right Updated: 11/14/24 1035     Magnesium 2.2 mg/dL     Basic metabolic panel [[id number]]  (Abnormal) Collected: 11/14/24 1011    Lab Status: Final result Specimen: Blood from Arm, Right Updated: 11/14/24 1035     Sodium 135 mmol/L      Potassium 3.4 mmol/L      Chloride 99 mmol/L      CO2 25 mmol/L      ANION GAP 11 mmol/L      BUN 22 mg/dL      Creatinine 1.23 mg/dL      Glucose 116 mg/dL      Calcium 7.8 mg/dL      eGFR 57 ml/min/1.73sq m     Narrative:      National Kidney Disease Foundation guidelines for Chronic Kidney Disease (CKD):     Stage 1 with normal or high GFR (GFR > 90 mL/min/1.73 square meters)    Stage 2 Mild CKD (GFR = 60-89 mL/min/1.73 square meters)    Stage 3A Moderate CKD (GFR = 45-59 mL/min/1.73 square meters)    Stage 3B Moderate CKD (GFR = 30-44 mL/min/1.73 square meters)    Stage 4 Severe CKD (GFR = 15-29 mL/min/1.73 square meters)    Stage 5 End Stage CKD (GFR <15 mL/min/1.73 square meters)  Note: GFR calculation is accurate only with a steady state creatinine    Hepatic function panel [[id number]]  (Abnormal) Collected: 11/14/24 1011    Lab Status: Final result Specimen: Blood from Arm, Right Updated: 11/14/24 1035     Total Bilirubin 1.92 mg/dL      Bilirubin, Direct 0.33 mg/dL      Alkaline Phosphatase 121 U/L       U/L       U/L      Total Protein 6.0 g/dL      Albumin 3.0 g/dL      Manual Differential(PHLEBS Do Not Order) [[id number]]  (Abnormal) Collected: 11/14/24 0931    Lab Status: Final result Specimen: Blood from Arm, Right Updated: 11/14/24 1003     Segmented % 76 %      Bands % 2 %      Lymphocytes % 12 %      Monocytes % 6 %      Eosinophils % 1 %      Basophils % 0 %      Atypical Lymphocytes % 3 %      Absolute Neutrophils 14.05 Thousand/uL      Absolute Lymphocytes 2.70 Thousand/uL      Absolute Monocytes 1.08 Thousand/uL      Absolute Eosinophils 0.18 Thousand/uL      Absolute Basophils 0.00 Thousand/uL      Total Counted --     RBC Morphology Normal     Platelet Estimate Adequate     Large Platelet Present    CBC and differential [[id number]]  (Abnormal) Collected: 11/14/24 0931    Lab Status: Final result Specimen: Blood from Arm, Right Updated: 11/14/24 0938     WBC 18.01 Thousand/uL      RBC 5.36 Million/uL      Hemoglobin 14.7 g/dL      Hematocrit 45.8 %      MCV 85 fL      MCH 27.4 pg      MCHC 32.1 g/dL      RDW 14.3 %      MPV 10.5 fL      Platelets 367 Thousands/uL     Narrative:      This is an appended report.  These results have been appended to a previously verified report.            US right upper quadrant   Final Interpretation by Xavier Cast MD (11/14 1222)      Emphysematous cholecystitis. Findings are concordant with the CT findings. Urgent surgical consultation is required.            Workstation performed: [id number]         CT abdomen pelvis with contrast   ED Interpretation by Xavier Cast DO (11/14 1114)   Air in GB wall, GB wall thickening        Final Interpretation by Usha Jones MD (11/14 1150)      Findings consistent with emphysematous/gangrenous cholecystitis complicated by perforation as evidenced by a 5.6 x 5.5 x 3.8 cm collection contiguous with the medial gallbladder wall and pneumoperitoneum.      I personally discussed this study with DR. XAVIER CAST on 11/14/2024 11:44 AM.               Workstation performed: [id number]          XR chest 1 view portable   Final Interpretation by Law Cast MD ( [id number])      No focal consolidation, pleural effusion, or pneumothorax.            Workstation performed: [id number]             Procedures    ED Medication and Procedure Management   Prior to Admission Medications   Prescriptions Last Dose Informant Patient Reported? Taking?   FLUoxetine (PROzac) 20 mg/5 mL solution 2024 Morning Self Yes Yes   Sig: Take by mouth daily   amLODIPine-benazepril (LOTREL 2.5-10) 2.5-10 MG per capsule 2024 Morning Self Yes Yes   Sig: Take 1 capsule by mouth daily   clindamycin (CLEOCIN T) 1 % external solution Not Taking Self No No   Sig: Apply topically 2 (two) times a day To face. Melchor Adrian, DEB 1950, # [id number]   Patient not taking: Reported on 2024   metroNIDAZOLE (METROGEL) 0.75 % gel  Self No No   Sig: Apply topically 2 (two) times a day   omeprazole (PriLOSEC) 40 MG capsule 2024 Morning Self Yes Yes   Sig: Take 40 mg by mouth daily   rosuvastatin (CRESTOR) 10 MG tablet 2024 Morning Self Yes Yes   Sig: Take 5 mg by mouth daily   simethicone (MYLICON) 80 mg chewable tablet Past Month Self Yes Yes   Sig: Chew 80 mg every 6 (six) hours as needed for flatulence   triamcinolone (KENALOG) 0.1 % cream Not Taking Self No No   Sig: Apply to affected spots twice a day for 2 weeks on the 1 week off.   Patient not taking: Reported on 2024      Facility-Administered Medications: None     Current Discharge Medication List        CONTINUE these medications which have NOT CHANGED    Details   amLODIPine-benazepril (LOTREL 2.5-10) 2.5-10 MG per capsule Take 1 capsule by mouth daily      FLUoxetine (PROzac) 20 mg/5 mL solution Take by mouth daily      omeprazole (PriLOSEC) 40 MG capsule Take 40 mg by mouth daily      rosuvastatin (CRESTOR) 10 MG tablet Take 5 mg by mouth daily      simethicone (MYLICON) 80 mg chewable tablet Chew 80 mg every 6 (six) hours as needed for flatulence       clindamycin (CLEOCIN T) 1 % external solution Apply topically 2 (two) times a day To face. Melchor Adrian,  1950, # [id number]  Qty: 30 mL, Refills: 2    Associated Diagnoses: Acne, unspecified acne type      metroNIDAZOLE (METROGEL) 0.75 % gel Apply topically 2 (two) times a day  Qty: 45 g, Refills: 2    Associated Diagnoses: Rosacea      triamcinolone (KENALOG) 0.1 % cream Apply to affected spots twice a day for 2 weeks on the 1 week off.  Qty: 80 g, Refills: 1    Associated Diagnoses: Prurigo nodularis           No discharge procedures on file.  ED SEPSIS DOCUMENTATION   Time reflects when diagnosis was documented in both MDM as applicable and the Disposition within this note       Time User Action Codes Description Comment    2024 11:58 AM Law Cast [K81.0] Acute emphysematous cholecystitis     2024 11:59 AM Law Cast [K81.0] Gangrenous cholecystitis     2024  3:12 PM Guera Toro [I10] Hypertension, unspecified type     2024  4:06 PM Keith Mueller [K81.0] Acute emphysematous cholecystitis     2024  4:06 PM Keith Mueller [K81.0] Gangrenous cholecystitis                  Law Cast DO  11/15/24 0943

## 2024-11-14 NOTE — ASSESSMENT & PLAN NOTE
74y M pw  abdominal pain, fatigue, nausea and vomiting x1 week  - initial symptoms started one week ago  - seen at  11/9/24 and felt likely food poisoning  - Started with more severe abdominal pain Sunday Monday and then extreme weakness  - WBC 18.01, Hgb 14.7, LFTs elevated    CT AP 11/14/24: Findings consistent with emphysematous/gangrenous cholecystitis complicated by perforation as evidenced by a 5.6 x 5.5 x 3.8 cm collection contiguous with the medial gallbladder wall and pneumoperitoneum.     Plan  Plan for OR today for laparoscopic cholecystectomy, possible open. This was discussed with the patient who is agreeable with the plan. Informed consent will be obtained by the surgeon  NPO/IVF for OR today, ok for diet post-operatively   IV antibiotics on-call to the OR and for microbial coverage  Trend labs, monitor WBC  Monitor vitals  Pain control PRN  Serial abdominal exams  IS post-operatively  Ambulation/OOB post-operatively  DVT prophylaxis   Consult [facility] for medical management given age and comorbidities

## 2024-11-14 NOTE — ANESTHESIA PREPROCEDURE EVALUATION
Procedure:  CHOLECYSTECTOMY LAPAROSCOPIC, possible open (Abdomen)    Relevant Problems   No relevant active problems      BMI 34.77  HTN  HLD  GERD    NPO >8hrs, last emesis 2 days ago. Denies nausea at this time.     Physical Exam    Airway    Mallampati score: II  TM Distance: >3 FB  Neck ROM: limited     Dental   Comment: Upper partial      Cardiovascular      Pulmonary      Other Findings        Anesthesia Plan  ASA Score- 3     Anesthesia Type- general with ASA Monitors.         Additional Monitors:     Airway Plan: ETT.           Plan Factors-    Chart reviewed.    Patient summary reviewed.                  Induction- intravenous.    Postoperative Plan- Plan for postoperative opioid use. Planned trial extubation    Perioperative Resuscitation Plan - Level 1 - Full Code.       Informed Consent- Anesthetic plan and risks discussed with patient.  I personally reviewed this patient with the CRNA. Discussed and agreed on the Anesthesia Plan with the CRNA..

## 2024-11-14 NOTE — H&P
H&P - Surgery-General   Name: Melchor Adrian 74 y.o. male I MRN: [id number]  Unit/Bed#: OR POOL I Date of Admission: 11/14/2024   Date of Service: 11/14/2024 I Hospital Day: 0     Assessment & Plan  Emphysematous cholecystitis  74y M pw  abdominal pain, fatigue, nausea and vomiting x1 week  - initial symptoms started one week ago  - seen at  11/9/24 and felt likely food poisoning  - Started with more severe abdominal pain Sunday Monday and then extreme weakness  - WBC 18.01, Hgb 14.7, LFTs elevated    CT AP 11/14/24: Findings consistent with emphysematous/gangrenous cholecystitis complicated by perforation as evidenced by a 5.6 x 5.5 x 3.8 cm collection contiguous with the medial gallbladder wall and pneumoperitoneum.     Plan  Plan for OR today for laparoscopic cholecystectomy, possible open. This was discussed with the patient who is agreeable with the plan. Informed consent will be obtained by the surgeon  NPO/IVF for OR today, ok for diet post-operatively   IV antibiotics on-call to the OR and for microbial coverage  Trend labs, monitor WBC  Monitor vitals  Pain control PRN  Serial abdominal exams  IS post-operatively  Ambulation/OOB post-operatively  DVT prophylaxis   Consult [facility] for medical management given age and comorbidities      History of Present Illness   Melchor Adrian is a 74 y.o. male who presents with abdominal pain, nausea, vomiting and generalized fatigue.  Patient states that after having a hamburger at [facility] last week he developed abdominal pain, nausea, vomiting and diarrhea.  He was seen in urgent care on 11/9/2024 and felt to have food poisoning.  He was sent home with supportive care.  He states that the next 2 days he developed more intense abdominal pain and on ability to tolerate p.o. intake.  He also noted more severe pain on his right side making it hard to breathe if he laid on that side.  He admits to generalized fatigue and weakness and finally prompted to come to the emergency  department for evaluation today.  Patient states he has not been able to tolerate anything p.o. for the last few days.  His last bowel movement was 2 days ago.  He is passing flatus.  He denies subjective fevers or chills.  He has no history of abdominal surgeries.  Most of his care is at [facility].    Review of Systems   Constitutional:  Negative for activity change, chills and fever.   HENT:  Negative for congestion, ear pain and sore throat.    Eyes:  Negative for pain and visual disturbance.   Respiratory:  Negative for cough and shortness of breath.    Cardiovascular:  Negative for chest pain and palpitations.   Gastrointestinal:  Positive for abdominal distention, abdominal pain, nausea and vomiting. Negative for diarrhea.   Genitourinary:  Negative for difficulty urinating, dysuria and hematuria.   Musculoskeletal:  Negative for arthralgias and back pain.   Skin:  Negative for color change and rash.   Neurological:  Negative for seizures and syncope.   Psychiatric/Behavioral: Negative.     All other systems reviewed and are negative.    I have reviewed the patient's PMH, PSH, Social History, Family History, Meds, and Allergies  Historical Information   Past Medical History:   Diagnosis Date    Basal cell carcinoma     Left Upper back    GERD (gastroesophageal reflux disease)     Hyperlipidemia     Hypertension     PTSD (post-traumatic stress disorder)      Past Surgical History:   Procedure Laterality Date    COLONOSCOPY      KNEE SURGERY      MASS EXCISION N/A 5/27/2020    Procedure: EXCISION BIOPSY TISSUE LESION/MASS RIGHT BUTTOCK;  Surgeon: Tez Erickson MD;  Location: [facility];  Service: General     Social History     Tobacco Use    Smoking status: Former    Smokeless tobacco: Never   Vaping Use    Vaping status: Never Used   Substance and Sexual Activity    Alcohol use: Not Currently    Drug use: Never    Sexual activity: Not on file     E-Cigarette/Vaping    E-Cigarette Use Never User       E-Cigarette/Vaping Substances    Nicotine No     THC No     CBD No     Flavoring No     Other No     Unknown No      Family history non-contributory  Social History     Tobacco Use    Smoking status: Former    Smokeless tobacco: Never   Vaping Use    Vaping status: Never Used   Substance and Sexual Activity    Alcohol use: Not Currently    Drug use: Never    Sexual activity: Not on file     Atorvastatin and Simvastatin    Objective :  Temp:  [97.4 °F (36.3 °C)-98.3 °F (36.8 °C)] 97.4 °F (36.3 °C)  HR:  [67-83] 77  BP: (101-158)/(57-80) 110/66  Resp:  [18-22] 18  SpO2:  [92 %-98 %] 94 %  O2 Device: None (Room air)  Nasal Cannula O2 Flow Rate (L/min):  [2 L/min] 2 L/min      Physical Exam  Vitals and nursing note reviewed.   Constitutional:       General: He is not in acute distress.     Appearance: He is well-developed. He is ill-appearing. He is not toxic-appearing.   HENT:      Head: Normocephalic and atraumatic.      Nose: Nose normal. No congestion.   Eyes:      General: No scleral icterus.     Conjunctiva/sclera: Conjunctivae normal.   Cardiovascular:      Rate and Rhythm: Normal rate and regular rhythm.      Pulses: Normal pulses.      Heart sounds: Normal heart sounds.   Pulmonary:      Effort: Pulmonary effort is normal. No respiratory distress.      Breath sounds: Normal breath sounds.   Abdominal:      General: Bowel sounds are normal. There is no distension.      Palpations: Abdomen is soft.      Tenderness: There is abdominal tenderness (RUQ focal tenderness with focal tendernes). There is no guarding.   Musculoskeletal:         General: No swelling.      Cervical back: Neck supple.      Right lower leg: No edema.      Left lower leg: No edema.   Skin:     General: Skin is warm and dry.      Capillary Refill: Capillary refill takes less than 2 seconds.      Coloration: Skin is not jaundiced.   Neurological:      General: No focal deficit present.      Mental Status: He is alert and oriented to person,  place, and time.   Psychiatric:         Mood and Affect: Mood normal.         Lab Results: I have reviewed the following results:  Recent Labs     11/14/24  0931 11/14/24  1011 11/14/24  1132   WBC 18.01*  --   --    HGB 14.7  --   --    HCT 45.8  --   --      --   --    BANDSPCT 2  --   --    SODIUM  --  135  --    K  --  3.4*  --    CL  --  99  --    CO2  --  25  --    BUN  --  22  --    CREATININE  --  1.23  --    GLUC  --  116  --    MG  --  2.2  --    AST  --  103*  --    ALT  --  105*  --    ALB  --  3.0*  --    TBILI  --  1.92*  --    ALKPHOS  --  121*  --    LACTICACID  --   --  1.0       Imaging Results Review: No pertinent imaging studies reviewed.  Other Study Results Review: No additional pertinent studies reviewed.    VTE Pharmacologic Prophylaxis:   Heparin  VTE Mechanical Prophylaxis: sequential compression device    Guera Toro  11/14/2024

## 2024-11-14 NOTE — ANESTHESIA POSTPROCEDURE EVALUATION
Post-Op Assessment Note    CV Status:  Stable  Pain Score: 0    Pain management: adequate       Mental Status:  Alert and awake   Hydration Status:  Euvolemic   PONV Controlled:  Controlled   Airway Patency:  Patent and adequate  Two or more mitigation strategies used for obstructive sleep apnea   Post Op Vitals Reviewed: Yes    No anethesia notable event occurred.    Staff: CRNA           Last Filed PACU Vitals:  Vitals Value Taken Time   Temp 98.3    Pulse 77 11/14/24 1731   /61 11/14/24 1730   Resp 17 11/14/24 1731   SpO2 86 % 11/14/24 1731   Vitals shown include unfiled device data.    Modified Vicente:  No data recorded

## 2024-11-14 NOTE — OP NOTE
OPERATIVE REPORT  PATIENT NAME: Melchor Adrian    :  1950  MRN: [id number]  Pt Location: [facility] OR ROOM 04    SURGERY DATE: 2024    Surgeons and Role:     * Law Lew,  - Primary     * Guera Toro PA-C - Assisting    Preop Diagnosis:  Acute emphysematous cholecystitis [K81.0]  Gangrenous cholecystitis [K81.0]    Post-Op Diagnosis Codes:     * Acute emphysematous cholecystitis [K81.0]     * Gangrenous cholecystitis [K81.0]    Procedure(s):  CHOLECYSTECTOMY LAPAROSCOPIC    Specimen(s):  ID Type Source Tests Collected by Time Destination   1 : GALLBLADDER AND CONTENTS Tissue Gallbladder TISSUE EXAM Law Lew,  2024 1616    A : GALLBLADDER ABSCESS CULTURE Body Fluid Gallbladder ANAEROBIC CULTURE AND GRAM STAIN, BODY FLUID CULTURE AND GRAM STAIN Lawdewayne Barragan Vipin, DO 2024 1605        Estimated Blood Loss:   Minimal    Drains:  Closed/Suction Drain Left LLQ Bulb 10 Fr. (Active)   Number of days: 0       Anesthesia Type:   General    Operative Indications:  Acute emphysematous cholecystitis [K81.0]  Gangrenous cholecystitis [K81.0]    Operative Findings:  Gangrenous acute cholecystitis with associated abscess    Complications:   None    Procedure and Technique:  The patient was seen again in Preoperative Holding.  All the risks, benefits, complications, treatment options, and expected outcomes were discussed with the patient and family at length. All questions were answered to satisfaction. There was concurrence with the proposed plan and informed consent was obtained. The site of surgery was properly noted/marked. The patient was taken to Operating Room, identified, and the procedure verified as laparoscopic cholecystectomy, possible open.     The patient was placed in the supine position on the operating room table.  The patient had received preoperative antibiotics and SCDs placed on the bilateral lower extremities.  Anesthesia was then induced and the patient was  intubated.    The abdomen was then prepped and draped in the usual sterile fashion using ChloraPrep.  A Time-Out was then performed with all involved present confirming the correct patient, procedure, antibiotics, and any additional concerns.  A 1.5 centimeter supraumbilical incision was made in a transverse fashion using a #15 blade and the umbilical stalk was grasped and elevated.  Using blunt dissection the fascia was exposed and was incised.  Using a large blunt Kell clamp the peritoneum was entered under direct visualization.  A 11 mm port was then placed in the fascial opening and pneumoperitoneum was established.  Initial pressure upon establishing pneumoperitoneum was noted to be low.  Additional 5 mm ports were placed under direct visualization in the following locations:  Subxiphoid, Right subcostal in the midclavicular line, Right subcostal and anterior axillary line.  The patient was then placed in reverse Trendelenburg and left lateral decubitus position.  The omentum was noted to be adhesed into the right upper quadrant completely covering the gallbladder and under the liver.  This was peeled off and purulent material was immediately seen.  There was an abscess cavity just medial to the gallbladder.  This was suctioned and sent for culture.  This was suctioned and copiously irrigated.  The gallbladder was noted to be acutely inflamed with a necrotic wall.  There were multiple stones within the gallbladder.  The gallbladder fundus was then grasped and retracted cephalad.  The gallbladder infundibulum was grasped and retracted cephalad and lateral.  Using a combination of blunt dissection using a [place] and the suction  both the cystic duct and cystic artery were exposed and skeletonized.  The proximal cystic duct was noted to be friable and  from the gallbladder.  Due to the amount of friability the decision was made to place an 0 PDS Endoloop on the cystic duct.  The cystic artery  was clipped using 5 mm clips, 1 clip distally and 2 clips proximally.  The cystic artery was then transected using laparoscopic scissors.  The gallbladder was then dissected off the liver bed using hook electrocautery.  The posterior wall of the gallbladder was noted to be necrotic and was left in place. Hemostasis was noted.  Both the cystic artery and cystic duct stumps were evaluated and no leakage of bile or blood was noted.  The gallbladder was placed in the Endo-Catch bag and removed via the umbilical port.  A 10 Macanese flat TAM drain was then brought out of the right lateral abdominal incision and placed in the gallbladder fossa.  This was stitched to the skin using interrupted 2-0 nylon.  The abdomen was desufflated and the supraumbilical fascial incision was closed with 0 Vicryl in an interrupted fashion using 5 stitches.  The abdomen was then reinsufflated and the fascial incision was inspected and noted to be hemostatic without any insufflation leakage.  Irrigation was instilled above the liver and in the infra hepatic area and was suctioned until clear. The remaining 5 mm ports were removed and the abdomen was desufflated.  Local anesthesia infiltrated in the port sites using 0.25% Marcaine. The port sites were then closed using 4-0 Monocryl.  The abdomen was then cleansed and dried and Steri-Strips, 2 x 2, Tegaderm were used to cover the sites.    All instrument, sponge, and needle counts were noted to be correct at the conclusion of the case.     I was present for the entire procedure., A qualified resident physician was not available., and A physician assistant was required during the procedure for retraction, tissue handling, dissection and suturing.    Patient Disposition:  PACU  and extubated and stable    SIGNATURE: Law Lew DO  DATE: November 14, 2024  TIME: 5:16 PM

## 2024-11-14 NOTE — ED NOTES
Placed Pt of 2L O2 due to his sat dipping into the 89 when he was asleep. Patient is now sat at 94 at this moment.             Griselda Naylor RN  11/14/24 3497

## 2024-11-15 PROBLEM — F43.10 POSTTRAUMATIC STRESS DISORDER: Status: ACTIVE | Noted: 2024-11-15

## 2024-11-15 PROBLEM — F43.10 POST-TRAUMATIC STRESS DISORDER, UNSPECIFIED: Status: ACTIVE | Noted: 2024-11-15

## 2024-11-15 PROBLEM — E78.2 MIXED HYPERLIPIDEMIA: Status: ACTIVE | Noted: 2024-11-15

## 2024-11-15 PROBLEM — I10 HYPERTENSION: Status: ACTIVE | Noted: 2024-11-15

## 2024-11-15 PROBLEM — E78.5 HYPERLIPIDEMIA: Status: ACTIVE | Noted: 2024-11-15

## 2024-11-15 PROBLEM — K21.9 GASTROESOPHAGEAL REFLUX DISEASE: Status: ACTIVE | Noted: 2024-11-15

## 2024-11-15 PROBLEM — E03.9 HYPOTHYROID: Status: ACTIVE | Noted: 2024-11-15

## 2024-11-15 LAB
ALBUMIN SERPL BCG-MCNC: 2.6 G/DL (ref 3.5–5)
ALP SERPL-CCNC: 123 U/L (ref 34–104)
ALT SERPL W P-5'-P-CCNC: 95 U/L (ref 7–52)
ANION GAP SERPL CALCULATED.3IONS-SCNC: 9 MMOL/L (ref 4–13)
AST SERPL W P-5'-P-CCNC: 106 U/L (ref 13–39)
BASOPHILS # BLD AUTO: 0.04 THOUSANDS/ÂΜL (ref 0–0.1)
BASOPHILS NFR BLD AUTO: 0 % (ref 0–1)
BILIRUB SERPL-MCNC: 1.54 MG/DL (ref 0.2–1)
BUN SERPL-MCNC: 19 MG/DL (ref 5–25)
CALCIUM ALBUM COR SERPL-MCNC: 9 MG/DL (ref 8.3–10.1)
CALCIUM SERPL-MCNC: 7.9 MG/DL (ref 8.4–10.2)
CHLORIDE SERPL-SCNC: 100 MMOL/L (ref 96–108)
CO2 SERPL-SCNC: 26 MMOL/L (ref 21–32)
CREAT SERPL-MCNC: 1.12 MG/DL (ref 0.6–1.3)
EOSINOPHIL # BLD AUTO: 0.01 THOUSAND/ÂΜL (ref 0–0.61)
EOSINOPHIL NFR BLD AUTO: 0 % (ref 0–6)
ERYTHROCYTE [DISTWIDTH] IN BLOOD BY AUTOMATED COUNT: 14.5 % (ref 11.6–15.1)
GFR SERPL CREATININE-BSD FRML MDRD: 64 ML/MIN/1.73SQ M
GLUCOSE SERPL-MCNC: 146 MG/DL (ref 65–140)
HAV IGM SER QL: NORMAL
HBV CORE IGM SER QL: NORMAL
HBV SURFACE AG SER QL: NORMAL
HCT VFR BLD AUTO: 37.9 % (ref 36.5–49.3)
HCV AB SER QL: NORMAL
HGB BLD-MCNC: 12.7 G/DL (ref 12–17)
IMM GRANULOCYTES # BLD AUTO: 0.38 THOUSAND/UL (ref 0–0.2)
IMM GRANULOCYTES NFR BLD AUTO: 2 % (ref 0–2)
LYMPHOCYTES # BLD AUTO: 1.03 THOUSANDS/ÂΜL (ref 0.6–4.47)
LYMPHOCYTES NFR BLD AUTO: 6 % (ref 14–44)
MAGNESIUM SERPL-MCNC: 2.3 MG/DL (ref 1.9–2.7)
MCH RBC QN AUTO: 28.2 PG (ref 26.8–34.3)
MCHC RBC AUTO-ENTMCNC: 33.5 G/DL (ref 31.4–37.4)
MCV RBC AUTO: 84 FL (ref 82–98)
MONOCYTES # BLD AUTO: 0.64 THOUSAND/ÂΜL (ref 0.17–1.22)
MONOCYTES NFR BLD AUTO: 3 % (ref 4–12)
NEUTROPHILS # BLD AUTO: 16.67 THOUSANDS/ÂΜL (ref 1.85–7.62)
NEUTS SEG NFR BLD AUTO: 89 % (ref 43–75)
NRBC BLD AUTO-RTO: 0 /100 WBCS
PHOSPHATE SERPL-MCNC: 4 MG/DL (ref 2.3–4.1)
PLATELET # BLD AUTO: 363 THOUSANDS/UL (ref 149–390)
PMV BLD AUTO: 10 FL (ref 8.9–12.7)
POTASSIUM SERPL-SCNC: 3.4 MMOL/L (ref 3.5–5.3)
PROT SERPL-MCNC: 5.8 G/DL (ref 6.4–8.4)
RBC # BLD AUTO: 4.5 MILLION/UL (ref 3.88–5.62)
SODIUM SERPL-SCNC: 135 MMOL/L (ref 135–147)
WBC # BLD AUTO: 18.77 THOUSAND/UL (ref 4.31–10.16)

## 2024-11-15 PROCEDURE — 80053 COMPREHEN METABOLIC PANEL: CPT | Performed by: PHYSICIAN ASSISTANT

## 2024-11-15 PROCEDURE — 84100 ASSAY OF PHOSPHORUS: CPT | Performed by: PHYSICIAN ASSISTANT

## 2024-11-15 PROCEDURE — 85025 COMPLETE CBC W/AUTO DIFF WBC: CPT | Performed by: PHYSICIAN ASSISTANT

## 2024-11-15 PROCEDURE — 99024 POSTOP FOLLOW-UP VISIT: CPT | Performed by: PHYSICIAN ASSISTANT

## 2024-11-15 PROCEDURE — 96366 THER/PROPH/DIAG IV INF ADDON: CPT

## 2024-11-15 PROCEDURE — 99223 1ST HOSP IP/OBS HIGH 75: CPT | Performed by: FAMILY MEDICINE

## 2024-11-15 PROCEDURE — 83735 ASSAY OF MAGNESIUM: CPT | Performed by: PHYSICIAN ASSISTANT

## 2024-11-15 RX ORDER — POTASSIUM CHLORIDE 1500 MG/1
40 TABLET, EXTENDED RELEASE ORAL ONCE
Status: COMPLETED | OUTPATIENT
Start: 2024-11-15 | End: 2024-11-15

## 2024-11-15 RX ORDER — PRAVASTATIN SODIUM 80 MG/1
80 TABLET ORAL
Status: DISCONTINUED | OUTPATIENT
Start: 2024-11-15 | End: 2024-11-15

## 2024-11-15 RX ORDER — LEVOTHYROXINE SODIUM 25 UG/1
25 TABLET ORAL
Status: DISCONTINUED | OUTPATIENT
Start: 2024-11-15 | End: 2024-11-17 | Stop reason: HOSPADM

## 2024-11-15 RX ORDER — PRAVASTATIN SODIUM 40 MG
40 TABLET ORAL
Status: DISCONTINUED | OUTPATIENT
Start: 2024-11-15 | End: 2024-11-17 | Stop reason: HOSPADM

## 2024-11-15 RX ORDER — LISINOPRIL 10 MG/1
10 TABLET ORAL DAILY
Status: DISCONTINUED | OUTPATIENT
Start: 2024-11-15 | End: 2024-11-17 | Stop reason: HOSPADM

## 2024-11-15 RX ORDER — AMLODIPINE BESYLATE 2.5 MG/1
2.5 TABLET ORAL DAILY
Status: DISCONTINUED | OUTPATIENT
Start: 2024-11-15 | End: 2024-11-17 | Stop reason: HOSPADM

## 2024-11-15 RX ORDER — PANTOPRAZOLE SODIUM 40 MG/1
40 TABLET, DELAYED RELEASE ORAL
Status: DISCONTINUED | OUTPATIENT
Start: 2024-11-16 | End: 2024-11-17 | Stop reason: HOSPADM

## 2024-11-15 RX ADMIN — PIPERACILLIN AND TAZOBACTAM 4.5 G: 36; 4.5 INJECTION, POWDER, FOR SOLUTION INTRAVENOUS at 22:35

## 2024-11-15 RX ADMIN — PIPERACILLIN AND TAZOBACTAM 4.5 G: 36; 4.5 INJECTION, POWDER, FOR SOLUTION INTRAVENOUS at 06:03

## 2024-11-15 RX ADMIN — PANTOPRAZOLE SODIUM 40 MG: 40 INJECTION, POWDER, FOR SOLUTION INTRAVENOUS at 08:09

## 2024-11-15 RX ADMIN — FLUOXETINE HYDROCHLORIDE 20 MG: 20 CAPSULE ORAL at 15:07

## 2024-11-15 RX ADMIN — HEPARIN SODIUM 5000 UNITS: 5000 INJECTION INTRAVENOUS; SUBCUTANEOUS at 22:35

## 2024-11-15 RX ADMIN — AMLODIPINE BESYLATE 2.5 MG: 2.5 TABLET ORAL at 08:51

## 2024-11-15 RX ADMIN — LEVOTHYROXINE SODIUM 25 MCG: 0.03 TABLET ORAL at 08:51

## 2024-11-15 RX ADMIN — HEPARIN SODIUM 5000 UNITS: 5000 INJECTION INTRAVENOUS; SUBCUTANEOUS at 13:40

## 2024-11-15 RX ADMIN — LISINOPRIL 10 MG: 10 TABLET ORAL at 08:51

## 2024-11-15 RX ADMIN — PRAVASTATIN SODIUM 40 MG: 40 TABLET ORAL at 15:50

## 2024-11-15 RX ADMIN — PIPERACILLIN AND TAZOBACTAM 4.5 G: 36; 4.5 INJECTION, POWDER, FOR SOLUTION INTRAVENOUS at 15:07

## 2024-11-15 RX ADMIN — POTASSIUM CHLORIDE 40 MEQ: 1500 TABLET, EXTENDED RELEASE ORAL at 17:41

## 2024-11-15 RX ADMIN — HEPARIN SODIUM 5000 UNITS: 5000 INJECTION INTRAVENOUS; SUBCUTANEOUS at 05:37

## 2024-11-15 NOTE — PROGRESS NOTES
Progress Note - Surgery-General   Name: Melchor Adrian 74 y.o. male I MRN: [id number]  Unit/Bed#: -01 I Date of Admission: 11/14/2024   Date of Service: 11/15/2024 I Hospital Day: 0    Assessment & Plan  Emphysematous cholecystitis  74y M POD1 s/p laparoscopic cholecystectomy for emphysematous gallbladder with perforation  - Intra-abdominal cultures pending  - Blood cultures pending, no growth at this time  - WBC 18.77, Hgb 12.7, LFTs elevated  - TAM 90cc s/s, slightly cloudy  - +flatus, tolerating CLD, incisions c/d/i, +abdominal tenderness at incision sites    Plan  Advance to surgical soft diet and monitor toleration  Ok to discontinue IV fluids  Trend labs, monitor WBC and vitals  Monitor O2 and wean oxygen  Analgesia and antiemetics PRN  Serial abdominal exams  IS 10x/hr  Ambulation/OOB post-operatively  DVT prophylaxis   SLIM following for medical management    Please contact the SecureChat role for the Surgery-General service with any questions/concerns.    Subjective   Pain at incision site. No nausea or vomiting, tolerating CLD. Denies SOB but restricted when taking deep breaths. +flatus, no BM. Denies fevers or chills.    Objective :  Temp:  [97.3 °F (36.3 °C)-98.3 °F (36.8 °C)] 97.4 °F (36.3 °C)  HR:  [64-78] 72  BP: (100-130)/(58-75) 130/67  Resp:  [15-19] 17  SpO2:  [90 %-94 %] 91 %  O2 Device: Nasal cannula  Nasal Cannula O2 Flow Rate (L/min):  [4 L/min] 4 L/min    I/O         11/13 0701 11/14 0700 11/14 0701  11/15 0700 11/15 0701  11/16 0700    P.O.  720 300    I.V. (mL/kg)  2150 (21.3)     IV Piggyback  200     Total Intake(mL/kg)  3070 (30.4) 300 (3)    Urine (mL/kg/hr)  500 200 (0.3)    Drains  90 10    Total Output  590 210    Net  +2480 +90                 Lines/Drains/Airways       Active Status       Name Placement date Placement time Site Days    Closed/Suction Drain Left LLQ Bulb 10 Fr. 11/14/24  1656  LLQ  less than 1                  Physical Exam  Vitals and nursing note reviewed.    Constitutional:       General: He is not in acute distress.     Appearance: He is well-developed. He is not ill-appearing or toxic-appearing.   HENT:      Head: Normocephalic and atraumatic.      Nose: Nose normal. No congestion.   Eyes:      General: No scleral icterus.     Conjunctiva/sclera: Conjunctivae normal.   Cardiovascular:      Rate and Rhythm: Normal rate and regular rhythm.      Pulses: Normal pulses.      Heart sounds: Normal heart sounds.   Pulmonary:      Effort: No respiratory distress.      Breath sounds: Normal breath sounds. Decreased air movement present.      Comments: Decreased inspiratory effort, shallow at bases  Abdominal:      General: Bowel sounds are normal. There is no distension.      Palpations: Abdomen is soft.      Tenderness: There is abdominal tenderness (appropriate at incision site). There is no guarding.      Comments: Incisions clean/dry/intact, appropriate incisional tenderness, no erythema or fluctuance   Musculoskeletal:         General: No swelling.      Cervical back: Neck supple.   Skin:     General: Skin is warm and dry.      Capillary Refill: Capillary refill takes less than 2 seconds.      Coloration: Skin is not jaundiced.   Neurological:      General: No focal deficit present.      Mental Status: He is alert and oriented to person, place, and time.   Psychiatric:         Mood and Affect: Mood normal.           Lab Results: I have reviewed the following results:  Recent Labs     11/14/24  0931 11/14/24  1011 11/14/24  1132 11/15/24  0438   WBC 18.01*  --   --  18.77*   HGB 14.7  --   --  12.7   HCT 45.8  --   --  37.9     --   --  363   BANDSPCT 2  --   --   --    SODIUM  --    < >  --  135   K  --    < >  --  3.4*   CL  --    < >  --  100   CO2  --    < >  --  26   BUN  --    < >  --  19   CREATININE  --    < >  --  1.12   GLUC  --    < >  --  146*   MG  --    < >  --  2.3   PHOS  --   --   --  4.0   AST  --    < >  --  106*   ALT  --    < >  --  95*   ALB   --    < >  --  2.6*   TBILI  --    < >  --  1.54*   ALKPHOS  --    < >  --  123*   LACTICACID  --   --  1.0  --     < > = values in this interval not displayed.     Imaging Results Review: No pertinent imaging studies reviewed.  Other Study Results Review: No additional pertinent studies reviewed.    VTE Pharmacologic Prophylaxis: VTE covered by:  heparin (porcine), Subcutaneous, 5,000 Units at 11/15/24 1340     VTE Mechanical Prophylaxis: sequential compression device    Guera Toro  11/15/2024

## 2024-11-15 NOTE — QUICK NOTE
Received critical lab blood culture 1 set of 2 + gram positive cocci in clusters. Continue Zosyn. Follow BCID panel for sensitives.

## 2024-11-15 NOTE — CONSULTS
Consultation - Hospitalist   Name: Melchor Adrian 74 y.o. male I MRN: [id number]  Unit/Bed#: -01 I Date of Admission: 11/14/2024   Date of Service: 11/15/2024 I Hospital Day: 0   Inpatient consult to Internal Medicine  Consult performed by: SHELLY Garrett  Consult ordered by: Guera Toro PA-C        Physician Requesting Evaluation: Law Lew DO   Reason for Evaluation / Principal Problem: Medical Mangement    Assessment & Plan  Emphysematous cholecystitis  Patient presented with nausea, abdominal pain, fatigue for 1 week  CT scan reveals:   emphysematous/gangrenous cholecystitis complicated by perforation as evidenced by a 5.6 x 5.5 x 3.8 cm collection contiguous with the medial gallbladder wall and pneumoperitoneum.   S/P Laparoscopic Cholecystectomy with TAM drain placement on 11/14  Pain Management  IV abx per primary  Diet advanced to surgical soft  Remainder of plan per primary team  Hypertension  BP: 130/67 HR 72    Plan:  Continue amlodipine, lisinopril  Monitor blood pressure  Gastroesophageal reflux disease  Continue pantoprazole  Hyperlipidemia  Continue statin  Posttraumatic stress disorder  Mood stable  Continue fluoxetine  Hypothyroid  Continue POA levothyroxine  Medications reviewed. Continue current medications at prescribed doses.      VTE Pharmacologic Prophylaxis:   High Risk (Score >/= 5) - Pharmacological DVT Prophylaxis Ordered: heparin. Sequential Compression Devices Ordered.  Code Status: Level 1 - Full Code   Discussion with family: Patient declined call to .     Anticipated Length of Stay: Patient will be admitted on an inpatient basis with an anticipated length of stay of greater than 2 midnights secondary to need for IV antibiotics.    History of Present Illness   Chief Complaint:    Chief Complaint   Patient presents with    Abdominal Pain     R side abdominal pain that shoots up to R shoulder along w/ N/V/D x4 days. PT states 'It hurts  "to breath when the pain comes on.\" Was seen recently for the same.          Melchor Adrian is a 74 y.o. male with a PMH of hypertension, GERD, hypothyroidism, PTSD and hyperlipidemia who presents with abdominal pain. Patient reports fatigue, poor appetite, nausea, vomiting for about 1 week. He denies fever, chills. He was initially seen at urgent care on 11/9/24 and treated for diarrhea/food poisoning.He thought his symptoms may be related to eating [employer] earlier in the week. However,  His symptoms failed to improve he reported to ED. On arrival to ED, CT scan revealed emphysematous/gangrenous cholecystitis, patient also with leukocytosis and elevated LFTs. Patient was emergently brought to OR for lap srinivasa.     Review of Systems   Constitutional:  Positive for fatigue.   Gastrointestinal:  Positive for abdominal pain, diarrhea and nausea.       Historical Information   Past Medical History:   Diagnosis Date    Basal cell carcinoma     Left Upper back    GERD (gastroesophageal reflux disease)     Hyperlipidemia     Hypertension     PTSD (post-traumatic stress disorder)      Past Surgical History:   Procedure Laterality Date    CHOLECYSTECTOMY LAPAROSCOPIC N/A 11/14/2024    Procedure: CHOLECYSTECTOMY LAPAROSCOPIC;  Surgeon: Law Lew DO;  Location: [facility];  Service: General    COLONOSCOPY      KNEE SURGERY      MASS EXCISION N/A 5/27/2020    Procedure: EXCISION BIOPSY TISSUE LESION/MASS RIGHT BUTTOCK;  Surgeon: Tez Erickson MD;  Location: [facility];  Service: General     Social History     Tobacco Use    Smoking status: Former    Smokeless tobacco: Never   Vaping Use    Vaping status: Never Used   Substance and Sexual Activity    Alcohol use: Not Currently    Drug use: Never    Sexual activity: Not on file     E-Cigarette/Vaping    E-Cigarette Use Never User      E-Cigarette/Vaping Substances    Nicotine No     THC No     CBD No     Flavoring No     Other No     Unknown No      Family history " non-contributory  Social History:  Marital Status: /Civil Union   Patient Pre-hospital Living Situation: Home, Alone  Patient Pre-hospital Level of Mobility: walks    Meds/Allergies   I have reviewed home medications with patient personally.  Prior to Admission medications    Medication Sig Start Date End Date Taking? Authorizing Provider   amLODIPine-benazepril (LOTREL 2.5-10) 2.5-10 MG per capsule Take 1 capsule by mouth daily   Yes Historical Provider, MD   FLUoxetine (PROzac) 20 mg/5 mL solution Take by mouth daily   Yes Historical Provider, MD   omeprazole (PriLOSEC) 40 MG capsule Take 40 mg by mouth daily   Yes Historical Provider, MD   rosuvastatin (CRESTOR) 10 MG tablet Take 5 mg by mouth daily   Yes Historical Provider, MD   simethicone (MYLICON) 80 mg chewable tablet Chew 80 mg every 6 (six) hours as needed for flatulence   Yes Historical Provider, MD   clindamycin (CLEOCIN T) 1 % external solution Apply topically 2 (two) times a day To face. Melchor Adrian,  1950, # [id number]  Patient not taking: Reported on 2024 9/15/21   Ivette Carias MD   metroNIDAZOLE (METROGEL) 0.75 % gel Apply topically 2 (two) times a day 23   Izzy Woods MD   triamcinolone (KENALOG) 0.1 % cream Apply to affected spots twice a day for 2 weeks on the 1 week off.  Patient not taking: Reported on 2024   Izzy Woods MD     Allergies   Allergen Reactions    Atorvastatin Other (See Comments)    Simvastatin Other (See Comments)       Objective :  Temp:  [97.3 °F (36.3 °C)-98.3 °F (36.8 °C)] 97.4 °F (36.3 °C)  HR:  [64-78] 72  BP: (100-130)/(58-75) 130/67  Resp:  [15-19] 17  SpO2:  [90 %-94 %] 91 %  O2 Device: Nasal cannula  Nasal Cannula O2 Flow Rate (L/min):  [4 L/min] 4 L/min    Physical Exam  Vitals and nursing note reviewed.   Constitutional:       General: He is in acute distress.   HENT:      Head: Normocephalic.      Mouth/Throat:      Mouth: Mucous membranes are moist.   Eyes:      General: No scleral  "icterus.  Cardiovascular:      Rate and Rhythm: Normal rate and regular rhythm.      Pulses: Normal pulses.      Heart sounds: Normal heart sounds.   Pulmonary:      Effort: Pulmonary effort is normal. No respiratory distress.      Breath sounds: Normal breath sounds. No wheezing.   Abdominal:      Palpations: Abdomen is soft.      Comments: Clean dry dressing in place   Musculoskeletal:         General: No swelling.   Skin:     General: Skin is warm.      Capillary Refill: Capillary refill takes less than 2 seconds.   Neurological:      Mental Status: He is alert and oriented to person, place, and time.   Psychiatric:         Mood and Affect: Mood normal.         Behavior: Behavior normal.         Thought Content: Thought content normal.         Judgment: Judgment normal.          Lines/Drains:  Lines/Drains/Airways       Active Status       Name Placement date Placement time Site Days    Closed/Suction Drain Left LLQ Bulb 10 Fr. 11/14/24  1656  LLQ  less than 1                          Lab Results: I have reviewed the following results:  Results from last 7 days   Lab Units 11/15/24  0438 11/14/24  0931   WBC Thousand/uL 18.77* 18.01*   HEMOGLOBIN g/dL 12.7 14.7   HEMATOCRIT % 37.9 45.8   PLATELETS Thousands/uL 363 367   BANDS PCT %  --  2   SEGS PCT % 89*  --    LYMPHO PCT % 6* 12*   MONO PCT % 3* 6   EOS PCT % 0 1     Results from last 7 days   Lab Units 11/15/24  0438   SODIUM mmol/L 135   POTASSIUM mmol/L 3.4*   CHLORIDE mmol/L 100   CO2 mmol/L 26   BUN mg/dL 19   CREATININE mg/dL 1.12   ANION GAP mmol/L 9   CALCIUM mg/dL 7.9*   ALBUMIN g/dL 2.6*   TOTAL BILIRUBIN mg/dL 1.54*   ALK PHOS U/L 123*   ALT U/L 95*   AST U/L 106*   GLUCOSE RANDOM mg/dL 146*             No results found for: \"HGBA1C\"  Results from last 7 days   Lab Units 11/14/24  1132   LACTIC ACID mmol/L 1.0       Imaging Results Review: No pertinent imaging studies reviewed.  Other Study Results Review: No additional pertinent studies " reviewed.    Administrative Statements   I have spent a total time of 40 minutes in caring for this patient on the day of the visit/encounter including Diagnostic results, Risks and benefits of tx options, Instructions for management, Patient and family education, Importance of tx compliance, Risk factor reductions, Impressions, Counseling / Coordination of care, Documenting in the medical record, Reviewing / ordering tests, medicine, procedures  , Obtaining or reviewing history  , and Communicating with other healthcare professionals .    ** Please Note: This note has been constructed using a voice recognition system. **

## 2024-11-15 NOTE — DISCHARGE INSTR - AVS FIRST PAGE
Follow up:  General: Please make sure you are scheduled for 2-week post op appointment. If you are not, please call the office to set up a post operative appointment to be seen in 2 weeks: [phone]    Wound care:    Bandage/Dressing -Make sure to remove the bandage in 48 hours, unless instructed otherwise by your surgeon. The steri-strips will fall off on their own. You may remove the steri strips 1 week from your surgery, if they are still in place. You may redress the incisions.   Do not apply any creams, lotions, or ointments.  Keep the incision clean and dry. You may shower starting the day after surgery (no baths, hot tubs, or swimming until you are cleared in the office for your post op). It is OK to allow soapy water to run over incisions, then rinse and pat dry.  DO NOT SCRUB.  Please contact your surgeon if your incisions have redness, extreme tenderness, or signs of infections (Pain, swelling redness, odor or green/yellow discharge around incisions)  You should also contact your surgeon if the wound has persistent, active bleeding.     3. Activity:   As tolerated, no strenuous activity for the first 2-4 weeks (unless advised differently by a provider).  Please take it easy for the first few days.    If you have had abdominal surgery, do not lift anything heavier than 10 pounds for at least 6 weeks.   We encourage you to use the provided incentive spirometer.   If you develop gas pain, ambulation (walking) will help pass the gas from anesthesia.  If you have severe gas pain, you may take GAS-X.   Gradually increase your activity daily. Walking 3-4 times daily is good and stairs are ok.  Listen to your body.  If you start to get tired or sore then rest.   No heavy lifting, pushing or pulling.   No driving for 5 days or while taking narcotics for pain.     4. Diet:   Resume your normal diet unless specified otherwise.    We recommend you slowly advance your diet. Try to start with softer bland foods  (soup, crackers, etc.) and gradually advance as tolerated.     5. Medications:   Resume all your previous medications, unless told otherwise by the doctor.   If taking narcotic pain medication, do not take on an empty stomach.  Tylenol is ok to use for pain, unless you are taking any narcotic pain medication containing Tylenol (such as Percocet, Vicodin, or anything containing acetaminophen). Do not take Tylenol if you're taking these medications. Take one or the other. Do not exceed more than 4000 mg of acetaminophen in 24 hours or 3000 mg if you have liver disease.   You may also take ibuprofen for pain.  If you become constipated, you may take a stool softener (colace). Take the stool softener while you are taking narcotic pain medication to help prevent constipation. If you continue to have constipation you may take Miralax or Milk of Magnesium.  All of these remedies are over the counter.   If you were given an antibiotic take it until it is finished.     6. Driving:   You will need a  home from the hospital. Do not drive or make any important decisions while on narcotic pain medication or 24 hours after surgery. No driving for 5 days or while taking narcotic pain medication    7. Constipation:   Patients often experience constipation after surgery.  You may take a stool softener (Colace) to help prevent constipation.    If you experience significant nausea or vomiting after abdominal surgery, call the office before trying any stronger medications or laxatives  Call the office if you haven't had a Bowel movement in 2-3days after surgery    8. Call the office: If you are experiencing any of the following, A provider is on call 24 hours a day:  You have a fever over 100.5°F or chills.    You have pain or nausea that is not relieved by medicine.    You have redness and swelling around your incisions, or blood or pus is leaking from your incisions.    You are constipated or have diarrhea.    Your skin or eyes  are yellow, or your bowel movements are pale.    You have questions or concerns about your surgery, condition, or care.  Seek care immediately or call 911 if:   You cannot stop vomiting.    Your bowel movements are black or bloody.    You have pain in your abdomen and it is swollen or hard.    Your arm or leg feels warm, tender, and painful. It may look swollen and red.   You feel lightheaded, short of breath, and have chest pain.    You cough up blood.

## 2024-11-15 NOTE — ASSESSMENT & PLAN NOTE
Patient presented with nausea, abdominal pain, fatigue for 1 week  CT scan reveals:   emphysematous/gangrenous cholecystitis complicated by perforation as evidenced by a 5.6 x 5.5 x 3.8 cm collection contiguous with the medial gallbladder wall and pneumoperitoneum.   S/P Laparoscopic Cholecystectomy with TAM drain placement on 11/14  Pain Management  IV abx per primary  Diet advanced to surgical soft  Remainder of plan per primary team

## 2024-11-15 NOTE — PLAN OF CARE

## 2024-11-15 NOTE — ANESTHESIA POSTPROCEDURE EVALUATION
Post-Op Assessment Note    CV Status:  Stable  Pain Score: 0    Pain management: adequate       Mental Status:  Alert and awake   Hydration Status:  Euvolemic   PONV Controlled:  Controlled   Airway Patency:  Patent and adequate  Two or more mitigation strategies used for obstructive sleep apnea   Post Op Vitals Reviewed: Yes    No anethesia notable event occurred.    Staff: CRNA           Last Filed PACU Vitals:  Vitals Value Taken Time   Temp 98.3    Pulse 77 11/14/24 1731   /61 11/14/24 1730   Resp 17 11/14/24 1731   SpO2 86 % 11/14/24 1731   Vitals shown include unfiled device data.    Modified Vicente:  Activity: 2 (11/14/2024  6:15 PM)  Respiration: 2 (11/14/2024  6:15 PM)  Circulation: 2 (11/14/2024  6:15 PM)  Consciousness: 2 (11/14/2024  6:15 PM)  Oxygen Saturation: 1 (11/14/2024  6:15 PM)  Modified Vicente Score: 9 (11/14/2024  6:15 PM)

## 2024-11-15 NOTE — ASSESSMENT & PLAN NOTE
74y M POD1 s/p laparoscopic cholecystectomy for emphysematous gallbladder with perforation  - Intra-abdominal cultures pending  - Blood cultures pending, no growth at this time  - WBC 18.77, Hgb 12.7, LFTs elevated  - TAM 90cc s/s, slightly cloudy  - +flatus, tolerating CLD, incisions c/d/i, +abdominal tenderness at incision sites    Plan  Advance to surgical soft diet and monitor toleration  Ok to discontinue IV fluids  Trend labs, monitor WBC and vitals  Monitor O2 and wean oxygen  Analgesia and antiemetics PRN  Serial abdominal exams  IS 10x/hr  Ambulation/OOB post-operatively  DVT prophylaxis   SLIM following for medical management

## 2024-11-15 NOTE — PLAN OF CARE
Problem: Potential for Falls  Goal: Patient will remain free of falls  Description: INTERVENTIONS:  - Educate patient/family on patient safety including physical limitations  - Instruct patient to call for assistance with activity   - Consult OT/PT to assist with strengthening/mobility   - Keep Call bell within reach  - Keep bed low and locked with side rails adjusted as appropriate  - Keep care items and personal belongings within reach  - Initiate and maintain comfort rounds  - Make Fall Risk Sign visible to staff  - Offer Toileting every 3 Hours, in advance of need  - Initiate/Maintain bed alarm  - Obtain necessary fall risk management equipment:   - Apply yellow socks and bracelet for high fall risk patients  - Consider moving patient to room near nurses station  11/15/2024 1448 by Bibiana Alcantar  Outcome: Progressing  11/15/2024 0923 by Bibiana Alcantar  Outcome: Progressing  11/15/2024 0922 by Bibiana Alcantar  Outcome: Progressing     Problem: PAIN - ADULT  Goal: Verbalizes/displays adequate comfort level or baseline comfort level  Description: Interventions:  - Encourage patient to monitor pain and request assistance  - Assess pain using appropriate pain scale  - Administer analgesics based on type and severity of pain and evaluate response  - Implement non-pharmacological measures as appropriate and evaluate response  - Consider cultural and social influences on pain and pain management  - Notify physician/advanced practitioner if interventions unsuccessful or patient reports new pain  11/15/2024 1448 by Bibiana Alcantar  Outcome: Progressing  11/15/2024 0923 by Bibiana Alcantar  Outcome: Progressing     Problem: INFECTION - ADULT  Goal: Absence or prevention of progression during hospitalization  Description: INTERVENTIONS:  - Assess and monitor for signs and symptoms of infection  - Monitor lab/diagnostic results  - Monitor all insertion sites, i.e. indwelling lines, tubes, and drains  - Monitor  endotracheal if appropriate and nasal secretions for changes in amount and color  - [place] appropriate cooling/warming therapies per order  - Administer medications as ordered  - Instruct and encourage patient and family to use good hand hygiene technique  - Identify and instruct in appropriate isolation precautions for identified infection/condition  11/15/2024 1448 by Bibiana Alcantar  Outcome: Progressing  11/15/2024 0923 by Bibiana Alcantar  Outcome: Progressing  Goal: Absence of fever/infection during neutropenic period  Description: INTERVENTIONS:  - Monitor WBC    11/15/2024 1448 by Bibiana Alcantar  Outcome: Progressing  11/15/2024 0923 by Bibiana Alcantar  Outcome: Progressing     Problem: SAFETY ADULT  Goal: Patient will remain free of falls  Description: INTERVENTIONS:  - Educate patient/family on patient safety including physical limitations  - Instruct patient to call for assistance with activity   - Consult OT/PT to assist with strengthening/mobility   - Keep Call bell within reach  - Keep bed low and locked with side rails adjusted as appropriate  - Keep care items and personal belongings within reach  - Initiate and maintain comfort rounds  - Make Fall Risk Sign visible to staff  - Offer Toileting every 3 Hours, in advance of need  - Initiate/Maintain bed alarm  - Obtain necessary fall risk management equipment:   - Apply yellow socks and bracelet for high fall risk patients  - Consider moving patient to room near nurses station  11/15/2024 1448 by Bibiana Alcantar  Outcome: Progressing  11/15/2024 0923 by Bibiana Alcantar  Outcome: Progressing  11/15/2024 0922 by Bibiana Alcantar  Outcome: Progressing  Goal: Maintain or return to baseline ADL function  Description: INTERVENTIONS:  -  Assess patient's ability to carry out ADLs; assess patient's baseline for ADL function and identify physical deficits which impact ability to perform ADLs (bathing, care of mouth/teeth, toileting, grooming,  dressing, etc.)  - Assess/evaluate cause of self-care deficits   - Assess range of motion  - Assess patient's mobility; develop plan if impaired  - Assess patient's need for assistive devices and provide as appropriate  - Encourage maximum independence but intervene and supervise when necessary  - Involve family in performance of ADLs  - Assess for home care needs following discharge   - Consider OT consult to assist with ADL evaluation and planning for discharge  - Provide patient education as appropriate  11/15/2024 1448 by Bibiana Alcantar  Outcome: Progressing  11/15/2024 0923 by Bibiana Alcantar  Outcome: Progressing  Goal: Maintains/Returns to pre admission functional level  Description: INTERVENTIONS:  - Perform AM-PAC 6 Click Basic Mobility/ Daily Activity assessment daily.  - Set and communicate daily mobility goal to care team and patient/family/caregiver.   - Collaborate with rehabilitation services on mobility goals if consulted  - Perform Range of Motion 3 times a day.  - Reposition patient every 3 hours.  - Dangle patient 3 times a day  - Stand patient 3 times a day  - Ambulate patient 3 times a day  - Out of bed to chair 3 times a day   - Out of bed for meals 3 times a day  - Out of bed for toileting  - Record patient progress and toleration of activity level   11/15/2024 1448 by Bibiana Alcantar  Outcome: Progressing  11/15/2024 0923 by Bibiana Alcantar  Outcome: Progressing     Problem: DISCHARGE PLANNING  Goal: Discharge to home or other facility with appropriate resources  Description: INTERVENTIONS:  - Identify barriers to discharge w/patient and caregiver  - Arrange for needed discharge resources and transportation as appropriate  - Identify discharge learning needs (meds, wound care, etc.)  - Arrange for interpretive services to assist at discharge as needed  - Refer to Case Management Department for coordinating discharge planning if the patient needs post-hospital services based on  physician/advanced practitioner order or complex needs related to functional status, cognitive ability, or social support system  11/15/2024 1448 by Bibiana Alcantar  Outcome: Progressing  11/15/2024 0923 by Bibiana Alcantar  Outcome: Progressing     Problem: Knowledge Deficit  Goal: Patient/family/caregiver demonstrates understanding of disease process, treatment plan, medications, and discharge instructions  Description: Complete learning assessment and assess knowledge base.  Interventions:  - Provide teaching at level of understanding  - Provide teaching via preferred learning methods  11/15/2024 1448 by Bibiana Alcantar  Outcome: Progressing  11/15/2024 0923 by Bibiana Alcantar  Outcome: Progressing

## 2024-11-15 NOTE — CASE MANAGEMENT
Case Management Assessment & Discharge Planning Note    Patient name Melchor dArian  Location /-01 MRN [id number]  : 1950 Date 11/15/2024       Current Admission Date: 2024  Current Admission Diagnosis:Emphysematous cholecystitis   Patient Active Problem List    Diagnosis Date Noted Date Diagnosed    Hypertension 11/15/2024     Gastroesophageal reflux disease 11/15/2024     Hyperlipidemia 11/15/2024     Posttraumatic stress disorder 11/15/2024     Mixed hyperlipidemia 11/15/2024     Post-traumatic stress disorder, unspecified 11/15/2024     Hypothyroid 11/15/2024     Emphysematous cholecystitis 2024     Sebaceous cyst 2020     Soft tissue mass 2020       LOS (days): 0  Geometric Mean LOS (GMLOS) (days):   Days to GMLOS:     OBJECTIVE:    Risk of Unplanned Readmission Score: 12.32         Current admission status: Inpatient       Preferred Pharmacy:   [id number] [place] [address] [place] [address]  Phone: [phone] Fax: [phone]    [id number] [place] - [address]  [address] [address]  Phone: [phone] Fax: [phone]    [facility] [place] - [address]  [address]  [address] [address]  Phone: [phone] Fax: [phone]    Primary Care Provider: No primary care provider on file.    Primary Insurance: [facility]  Secondary Insurance:     ASSESSMENT:  Active Health Care Proxies    There are no active Health Care Proxies on file.                 Readmission Root Cause  30 Day Readmission: No    Patient Information  Admitted from:: Home  Mental Status: Alert  During Assessment patient was accompanied by: Not accompanied during assessment  Assessment information provided by:: Patient  Primary Caregiver: Self  Support Systems: Daughter, Friend  County of Residence: [place]  What city do you live  in[place]  Home entry access options. Select all that apply.: Stairs  Number of steps to enter home.: 2  Do the steps have railings?: No  Type of Current Residence: 2 story home  Upon entering residence, is there a bedroom on the main floor (no further steps)?: No  A bedroom is located on the following floor levels of residence (select all that apply):: 2nd Floor  Upon entering residence, is there a bathroom on the main floor (no further steps)?:  (1/2 bath only on first floor)  Number of steps to 2nd floor from main floor: One Flight  Living Arrangements: Lives Alone  Is patient a ?: Yes  Is patient active with [facility] ([facility] [employer])?: Yes  Is patient service connected?: Yes (uses [facility])    Activities of Daily Living Prior to Admission  Functional Status: Independent  Completes ADLs independently?: Yes  Ambulates independently?: Yes  Does patient use assisted devices?: Yes (cane for long distances)  Assisted Devices (DME) used: Straight Cane  Does patient currently own DME?: Yes  What DME does the patient currently own?: Straight Cane  Does patient have a history of Outpatient Therapy (PT/OT)?: Yes  Does the patient have a history of Short-Term Rehab?: Yes  Does patient have a history of HHC?: No  Does patient currently have HHC?: No         Patient Information Continued  Income Source: Other (Comment) (pension and SSI)  Does patient have prescription coverage?: Yes (gets meds from [facility])  Does patient receive dialysis treatments?: No  Does patient have a history of substance abuse?: Yes  Historical substance use preference: Alcohol/ETOH, Cocaine  Is patient currently in treatment for substance abuse?: N/A - sober  Does patient have a history of Mental Health Diagnosis?: No         Means of Transportation  Means of Transport to Appts:: Drives Self      Social Determinants of Health (SDOH)      Flowsheet Row Most Recent Value   Housing Stability    In the last 12 months,  was there a time when you were not able to pay the mortgage or rent on time? N   In the past 12 months, how many times have you moved where you were living? 0   At any time in the past 12 months, were you homeless or living in a shelter (including now)? N   Transportation Needs    In the past 12 months, has lack of transportation kept you from medical appointments or from getting medications? no   In the past 12 months, has lack of transportation kept you from meetings, work, or from getting things needed for daily living? No   Food Insecurity    Within the past 12 months, you worried that your food would run out before you got the money to buy more. Never true   Within the past 12 months, the food you bought just didn't last and you didn't have money to get more. Never true   Utilities    In the past 12 months has the electric, gas, oil, or water company threatened to shut off services in your home? No            DISCHARGE DETAILS:    Discharge planning discussed with:: pt  Freedom of Choice: Yes  Comments - Freedom of Choice: Met w pt at bedside; introduced self and explained role of CM, including arranging DME, STR, home health, out pt tx.  Advised pt that CM will make appropriate referrals based on treatment team recommendations and MD orders, and he can consider recommended plan of care and choose from available vendors.  CM contacted family/caregiver?: No- see comments (pt alert and oriented adult)  Were Treatment Team discharge recommendations reviewed with patient/caregiver?:  (none at present)          Contacts  Patient Contacts: Susanne Verdugo (Daughter)  [phone] (Mobile)  Relationship to Patient:: Family         DME Referral Provided  Referral made for DME?: No    Other Referral/Resources/Interventions Provided:  Referral Comments: Pt s/p raymond bernabe yesterday.  Lives alone; might require home health.  Unclear if pt will be discharged w bulb suction presently in place.  [facility];  text sent to  [facility] NP Priscila, asking for PT/OT eval order.  She was agreeable.  Pt w VA insurance;  will need auth for any post d/c services.         Treatment Team Recommendation: Other (TBD)  Discharge Destination Plan:: Other (TBD)  Transport at Discharge : Other (Comment) (friends Ninfa and Durga)

## 2024-11-15 NOTE — PLAN OF CARE
Problem: Potential for Falls  Goal: Patient will remain free of falls  Description: INTERVENTIONS:  - Educate patient/family on patient safety including physical limitations  - Instruct patient to call for assistance with activity   - Consult OT/PT to assist with strengthening/mobility   - Keep Call bell within reach  - Keep bed low and locked with side rails adjusted as appropriate  - Keep care items and personal belongings within reach  - Initiate and maintain comfort rounds  - Make Fall Risk Sign visible to staff  - Offer Toileting every 2 Hours, in advance of need  - Initiate/Maintain bed alarm  - Obtain necessary fall risk management equipment:     - Apply yellow socks and bracelet for high fall risk patients  - Consider moving patient to room near nurses station  Outcome: Progressing

## 2024-11-16 ENCOUNTER — APPOINTMENT (INPATIENT)
Dept: RADIOLOGY | Facility: HOSPITAL | Age: 74
DRG: 417 | End: 2024-11-16
Payer: COMMERCIAL

## 2024-11-16 PROBLEM — J96.01 ACUTE RESPIRATORY FAILURE WITH HYPOXIA (HCC): Status: ACTIVE | Noted: 2024-11-16

## 2024-11-16 PROBLEM — R78.81 POSITIVE BLOOD CULTURE: Status: ACTIVE | Noted: 2024-11-16

## 2024-11-16 PROBLEM — R74.01 TRANSAMINITIS: Status: ACTIVE | Noted: 2024-11-16

## 2024-11-16 LAB
ALBUMIN SERPL BCG-MCNC: 2.8 G/DL (ref 3.5–5)
ALL TARGETS: NOT DETECTED
ALP SERPL-CCNC: 113 U/L (ref 34–104)
ALT SERPL W P-5'-P-CCNC: 63 U/L (ref 7–52)
ANION GAP SERPL CALCULATED.3IONS-SCNC: 9 MMOL/L (ref 4–13)
AST SERPL W P-5'-P-CCNC: 57 U/L (ref 13–39)
BACTERIA SPEC ANAEROBE CULT: ABNORMAL
BILIRUB SERPL-MCNC: 0.86 MG/DL (ref 0.2–1)
BUN SERPL-MCNC: 21 MG/DL (ref 5–25)
CALCIUM ALBUM COR SERPL-MCNC: 9 MG/DL (ref 8.3–10.1)
CALCIUM SERPL-MCNC: 8 MG/DL (ref 8.4–10.2)
CHLORIDE SERPL-SCNC: 102 MMOL/L (ref 96–108)
CO2 SERPL-SCNC: 28 MMOL/L (ref 21–32)
CREAT SERPL-MCNC: 1.13 MG/DL (ref 0.6–1.3)
ERYTHROCYTE [DISTWIDTH] IN BLOOD BY AUTOMATED COUNT: 14.7 % (ref 11.6–15.1)
GFR SERPL CREATININE-BSD FRML MDRD: 63 ML/MIN/1.73SQ M
GLUCOSE SERPL-MCNC: 106 MG/DL (ref 65–140)
HCT VFR BLD AUTO: 38.6 % (ref 36.5–49.3)
HGB BLD-MCNC: 12.5 G/DL (ref 12–17)
MCH RBC QN AUTO: 28 PG (ref 26.8–34.3)
MCHC RBC AUTO-ENTMCNC: 32.4 G/DL (ref 31.4–37.4)
MCV RBC AUTO: 87 FL (ref 82–98)
PLATELET # BLD AUTO: 421 THOUSANDS/UL (ref 149–390)
PMV BLD AUTO: 10.3 FL (ref 8.9–12.7)
POTASSIUM SERPL-SCNC: 3.4 MMOL/L (ref 3.5–5.3)
PROT SERPL-MCNC: 5.7 G/DL (ref 6.4–8.4)
RBC # BLD AUTO: 4.46 MILLION/UL (ref 3.88–5.62)
SODIUM SERPL-SCNC: 139 MMOL/L (ref 135–147)
WBC # BLD AUTO: 17.68 THOUSAND/UL (ref 4.31–10.16)

## 2024-11-16 PROCEDURE — 85027 COMPLETE CBC AUTOMATED: CPT | Performed by: PHYSICIAN ASSISTANT

## 2024-11-16 PROCEDURE — 99024 POSTOP FOLLOW-UP VISIT: CPT | Performed by: SURGERY

## 2024-11-16 PROCEDURE — 80053 COMPREHEN METABOLIC PANEL: CPT | Performed by: PHYSICIAN ASSISTANT

## 2024-11-16 PROCEDURE — 71045 X-RAY EXAM CHEST 1 VIEW: CPT

## 2024-11-16 PROCEDURE — 99232 SBSQ HOSP IP/OBS MODERATE 35: CPT | Performed by: FAMILY MEDICINE

## 2024-11-16 RX ORDER — POTASSIUM CHLORIDE 1500 MG/1
40 TABLET, EXTENDED RELEASE ORAL ONCE
Status: COMPLETED | OUTPATIENT
Start: 2024-11-16 | End: 2024-11-16

## 2024-11-16 RX ORDER — ALBUTEROL SULFATE 0.83 MG/ML
2.5 SOLUTION RESPIRATORY (INHALATION) EVERY 4 HOURS PRN
Status: DISCONTINUED | OUTPATIENT
Start: 2024-11-16 | End: 2024-11-17 | Stop reason: HOSPADM

## 2024-11-16 RX ADMIN — PIPERACILLIN AND TAZOBACTAM 4.5 G: 36; 4.5 INJECTION, POWDER, FOR SOLUTION INTRAVENOUS at 16:36

## 2024-11-16 RX ADMIN — LISINOPRIL 10 MG: 10 TABLET ORAL at 08:48

## 2024-11-16 RX ADMIN — PIPERACILLIN AND TAZOBACTAM 4.5 G: 36; 4.5 INJECTION, POWDER, FOR SOLUTION INTRAVENOUS at 08:48

## 2024-11-16 RX ADMIN — HEPARIN SODIUM 5000 UNITS: 5000 INJECTION INTRAVENOUS; SUBCUTANEOUS at 16:35

## 2024-11-16 RX ADMIN — PRAVASTATIN SODIUM 40 MG: 40 TABLET ORAL at 16:35

## 2024-11-16 RX ADMIN — LEVOTHYROXINE SODIUM 25 MCG: 0.03 TABLET ORAL at 05:53

## 2024-11-16 RX ADMIN — POTASSIUM CHLORIDE 40 MEQ: 1500 TABLET, EXTENDED RELEASE ORAL at 16:35

## 2024-11-16 RX ADMIN — AMLODIPINE BESYLATE 2.5 MG: 2.5 TABLET ORAL at 08:48

## 2024-11-16 RX ADMIN — HEPARIN SODIUM 5000 UNITS: 5000 INJECTION INTRAVENOUS; SUBCUTANEOUS at 22:02

## 2024-11-16 RX ADMIN — PIPERACILLIN AND TAZOBACTAM 4.5 G: 36; 4.5 INJECTION, POWDER, FOR SOLUTION INTRAVENOUS at 22:40

## 2024-11-16 RX ADMIN — HEPARIN SODIUM 5000 UNITS: 5000 INJECTION INTRAVENOUS; SUBCUTANEOUS at 05:53

## 2024-11-16 RX ADMIN — PANTOPRAZOLE SODIUM 40 MG: 40 TABLET, DELAYED RELEASE ORAL at 05:53

## 2024-11-16 RX ADMIN — FLUOXETINE HYDROCHLORIDE 20 MG: 20 CAPSULE ORAL at 08:48

## 2024-11-16 NOTE — PROGRESS NOTES
Progress Note - Hospitalist   Name: Melchor Adrian 74 y.o. male I MRN: [id number]  Unit/Bed#: -01 I Date of Admission: 11/14/2024   Date of Service: 11/16/2024 I Hospital Day: 1    Assessment & Plan  Emphysematous cholecystitis  Patient presented with nausea, abdominal pain, fatigue for 1 week  CT scan reveals:   emphysematous/gangrenous cholecystitis complicated by perforation as evidenced by a 5.6 x 5.5 x 3.8 cm collection contiguous with the medial gallbladder wall and pneumoperitoneum.   S/P Laparoscopic Cholecystectomy with TAM drain placement on 11/14  Pain Management  Continue IV Zosyn  Diet advanced to surgical soft  Remainder of plan per primary team  Acute respiratory failure with hypoxia (HCC)  Patient with post op hypoxia, Desaturation to 87-89% on RA  Monitor O2, supplement as needed, maintain O2 >92%  CXR ordered, final read pending  Continue IS every hour while awake    Hypertension  BP: 113/71    Plan:  Continue amlodipine, lisinopril  Monitor blood pressure  Gastroesophageal reflux disease  Continue pantoprazole  Hyperlipidemia  Continue statin  Posttraumatic stress disorder  Mood stable  Continue fluoxetine  Hypothyroid  Continue POA levothyroxine  Transaminitis  Lab Results   Component Value Date    AST 57 (H) 11/16/2024     (H) 11/15/2024    ALT 63 (H) 11/16/2024    ALT 95 (H) 11/15/2024    TBILI 0.86 11/16/2024    TBILI 1.54 (H) 11/15/2024   Likely secondary to cholecystitis  Now down trending  Patient without nausea, vomiting, abdominal pain  Monitor liver function    Positive blood culture  Bld culture x1 +coagulase-negative Staphylococcus   1 set of blood cultures is positive, this may represent a contaminant.   Will continue to follow second set of cultures  Continue IV  zosyn for now    VTE Pharmacologic Prophylaxis: VTE Score: 6 High Risk (Score >/= 5) - Pharmacological DVT Prophylaxis Ordered: heparin. Sequential Compression Devices Ordered.    Mobility:   Basic Mobility  Inpatient Raw Score: 14  JH-HLM Goal: 4: Move to chair/commode  JH-HLM Achieved: 6: Walk 10 steps or more  JH-HLM Goal achieved. Continue to encourage appropriate mobility.    Patient Centered Rounds: I performed bedside rounds with nursing staff today.   Discussions with Specialists or Other Care Team Provider:   General surgery    Education and Discussions with Family / Patient: Updated  (daughter) via phone.    Current Length of Stay: 1 day(s)  Current Patient Status: Inpatient   Certification Statement: The patient will continue to require additional inpatient hospital stay due to hypoxia  Discharge Plan: Anticipate discharge tomorrow to home.    Code Status: Level 1 - Full Code    Subjective   Patient seen and examined resting comfortably in bed. Reports pain well controlled. Denies chest pain, shortness    Objective :  Temp:  [97.3 °F (36.3 °C)-98.4 °F (36.9 °C)] 97.7 °F (36.5 °C)  HR:  [63-70] 70  BP: (113-129)/(68-73) 129/71  Resp:  [16-18] 16  SpO2:  [89 %-93 %] 93 %  O2 Device: Nasal cannula  Nasal Cannula O2 Flow Rate (L/min):  [1 L/min] 1 L/min    Body mass index is 34.84 kg/m².     Input and Output Summary (last 24 hours):     Intake/Output Summary (Last 24 hours) at 11/16/2024 1511  Last data filed at 11/16/2024 1300  Gross per 24 hour   Intake 1300 ml   Output 1570 ml   Net -270 ml       Physical Exam  Vitals and nursing note reviewed.   Constitutional:       General: He is not in acute distress.     Appearance: He is obese.   HENT:      Head: Normocephalic.      Mouth/Throat:      Mouth: Mucous membranes are moist.   Cardiovascular:      Rate and Rhythm: Normal rate and regular rhythm.      Pulses: Normal pulses.      Heart sounds: Normal heart sounds.   Pulmonary:      Effort: Pulmonary effort is normal.      Breath sounds: Examination of the right-middle field reveals wheezing. Examination of the right-lower field reveals wheezing. Wheezing present.   Abdominal:      Palpations:  Abdomen is soft.      Comments: TAM drain in place   Musculoskeletal:         General: No swelling.   Skin:     General: Skin is warm and dry.      Capillary Refill: Capillary refill takes less than 2 seconds.   Neurological:      Mental Status: He is alert and oriented to person, place, and time.      Motor: No weakness.   Psychiatric:         Mood and Affect: Mood normal.         Behavior: Behavior normal.         Thought Content: Thought content normal.           Lines/Drains:  Lines/Drains/Airways       Active Status       Name Placement date Placement time Site Days    Closed/Suction Drain Left LLQ Bulb 10 Fr. 11/14/24  1656  LLQ  1                            Lab Results: I have reviewed the following results:   Results from last 7 days   Lab Units 11/16/24  0450 11/15/24  0438 11/14/24  0931   WBC Thousand/uL 17.68* 18.77* 18.01*   HEMOGLOBIN g/dL 12.5 12.7 14.7   HEMATOCRIT % 38.6 37.9 45.8   PLATELETS Thousands/uL 421* 363 367   BANDS PCT %  --   --  2   SEGS PCT %  --  89*  --    LYMPHO PCT %  --  6* 12*   MONO PCT %  --  3* 6   EOS PCT %  --  0 1     Results from last 7 days   Lab Units 11/16/24  0450   SODIUM mmol/L 139   POTASSIUM mmol/L 3.4*   CHLORIDE mmol/L 102   CO2 mmol/L 28   BUN mg/dL 21   CREATININE mg/dL 1.13   ANION GAP mmol/L 9   CALCIUM mg/dL 8.0*   ALBUMIN g/dL 2.8*   TOTAL BILIRUBIN mg/dL 0.86   ALK PHOS U/L 113*   ALT U/L 63*   AST U/L 57*   GLUCOSE RANDOM mg/dL 106                 Results from last 7 days   Lab Units 11/14/24  1132   LACTIC ACID mmol/L 1.0       Recent Cultures (last 7 days):   Results from last 7 days   Lab Units 11/14/24  1605 11/14/24  1132 11/14/24  1127   BLOOD CULTURE   --  Staphylococcus coagulase negative* No Growth at 24 hrs.   GRAM STAIN RESULT  3+ Polys*  3+ Gram positive rods*  1+ Gram negative rods* Gram positive cocci in clusters*  --    BODY FLUID CULTURE, STERILE  Culture too young- will reincubate  --   --        Imaging Results Review: No pertinent  imaging studies reviewed.  Other Study Results Review: No additional pertinent studies reviewed.    Last 24 Hours Medication List:     Current Facility-Administered Medications:     acetaminophen (TYLENOL) tablet 650 mg, Q6H PRN    amLODIPine (NORVASC) tablet 2.5 mg, Daily **AND** lisinopril (ZESTRIL) tablet 10 mg, Daily    FLUoxetine (PROzac) capsule 20 mg, Daily    heparin (porcine) subcutaneous injection 5,000 Units, Q8H ASIF    HYDROmorphone (DILAUDID) injection 0.2 mg, Q2H PRN    levothyroxine tablet 25 mcg, Early Morning    ondansetron (ZOFRAN) injection 4 mg, Q6H PRN    oxyCODONE (ROXICODONE) IR tablet 5 mg, Q4H PRN    pantoprazole (PROTONIX) EC tablet 40 mg, Early Morning    piperacillin-tazobactam (ZOSYN) IVPB (EXTENDED INFUSION) 4.5 g, Q8H    potassium chloride (Klor-Con M20) CR tablet 40 mEq, Once    pravastatin (PRAVACHOL) tablet 40 mg, Daily With Dinner    traMADol (ULTRAM) tablet 50 mg, Q6H PRN    Administrative Statements   Today, Patient Was Seen By: SHELLY Lopez  I have spent a total time of 32 minutes in caring for this patient on the day of the visit/encounter including Risks and benefits of tx options, Patient and family education, Importance of tx compliance, Risk factor reductions, Counseling / Coordination of care, Documenting in the medical record, Reviewing / ordering tests, medicine, procedures  , Obtaining or reviewing history  , and Communicating with other healthcare professionals .    **Please Note: This note may have been constructed using a voice recognition system.**

## 2024-11-16 NOTE — ASSESSMENT & PLAN NOTE
Patient with post op hypoxia, Desaturation to 87-89% on RA  Monitor O2, supplement as needed, maintain O2 >92%  CXR ordered, final read pending  Continue IS every hour while awake

## 2024-11-16 NOTE — ASSESSMENT & PLAN NOTE
Patient presented with nausea, abdominal pain, fatigue for 1 week  CT scan reveals:   emphysematous/gangrenous cholecystitis complicated by perforation as evidenced by a 5.6 x 5.5 x 3.8 cm collection contiguous with the medial gallbladder wall and pneumoperitoneum.   S/P Laparoscopic Cholecystectomy with TAM drain placement on 11/14  Pain Management  Continue IV Zosyn  Diet advanced to surgical soft  Remainder of plan per primary team

## 2024-11-16 NOTE — ASSESSMENT & PLAN NOTE
Lab Results   Component Value Date    AST 57 (H) 11/16/2024     (H) 11/15/2024    ALT 63 (H) 11/16/2024    ALT 95 (H) 11/15/2024    TBILI 0.86 11/16/2024    TBILI 1.54 (H) 11/15/2024   Likely secondary to cholecystitis  Now down trending  Patient without nausea, vomiting, abdominal pain  Monitor liver function

## 2024-11-16 NOTE — PROGRESS NOTES
"Progress Note - Surgery-General   Name: Melchor Adrian 74 y.o. male I MRN: [id number]  Unit/Bed#: -01 I Date of Admission: 11/14/2024   Date of Service: 11/16/2024 I Hospital Day: 1     Assessment & Plan  Emphysematous cholecystitis  74y M POD2 s/p laparoscopic cholecystectomy for emphysematous gallbladder with perforation  - Intra-abdominal cultures pending - growing 3+ gram pos and 1+ gram neg.   - Blood cultures 1/2 growing gram pos. cocci    WBC 17.6 from 18.7, remainder of labs unremarkable  TAM drain 40    Plan  Continue regular diet as tolerated.  Will plan for discharge once cleared by medicine.   Continue IV antibiotics  Monitor and record TAM drain output.  Plan for removal prior to discharge.  Monitor abdominal exam, labs, vitals  PRN pain medication and anti-emetics  Encourage ambulation  DVT ppx: heparin  Incentive spirometry 10 times/hour while awake  Continue home medications as prescribed   General Surgery is primary  Hypertension    Gastroesophageal reflux disease    Hyperlipidemia    Posttraumatic stress disorder    Hypothyroid      Subjective/Objective    Subjective: No acute events overnight.     Objective:     Blood pressure 113/71, pulse 69, temperature 98.4 °F (36.9 °C), resp. rate 16, height 5' 7\" (1.702 m), weight 101 kg (222 lb 7.1 oz), SpO2 (!) 89%.,Body mass index is 34.84 kg/m².      Intake/Output Summary (Last 24 hours) at 11/16/2024 0842  Last data filed at 11/16/2024 0501  Gross per 24 hour   Intake 1420 ml   Output 1590 ml   Net -170 ml       Invasive Devices       Peripheral Intravenous Line  Duration             Peripheral IV 11/14/24 Right Antecubital 1 day    Peripheral IV 11/15/24 Left Antecubital <1 day              Drain  Duration             Closed/Suction Drain Left LLQ Bulb 10 Fr. 1 day                    Physical Exam:   GEN: NAD  HEENT: NCAT, MMM  CV: RRR, no m/r/g  Lung: Normal effort, CTA B/L, no w/r/r  Ab: Soft, nondistended, appropriate tender palpation around " surgical incisions.  Incisions are clean dry and intact with no surrounding erythema, edema, exudate.  TAM drain in place and functioning with serosanguineous output.  Extrem: No CCE   Neuro: A+Ox3     Lab, Imaging and other studies:I have personally reviewed pertinent lab results.     VTE Pharmacologic Prophylaxis: VTE covered by:  heparin (porcine), Subcutaneous, 5,000 Units at 11/16/24 0553     VTE Mechanical Prophylaxis: sequential compression device    Recent Results (from the past 36 hours)   Comprehensive metabolic panel    Collection Time: 11/15/24  4:38 AM   Result Value Ref Range    Sodium 135 135 - 147 mmol/L    Potassium 3.4 (L) 3.5 - 5.3 mmol/L    Chloride 100 96 - 108 mmol/L    CO2 26 21 - 32 mmol/L    ANION GAP 9 4 - 13 mmol/L    BUN 19 5 - 25 mg/dL    Creatinine 1.12 0.60 - 1.30 mg/dL    Glucose 146 (H) 65 - 140 mg/dL    Calcium 7.9 (L) 8.4 - 10.2 mg/dL    Corrected Calcium 9.0 8.3 - 10.1 mg/dL     (H) 13 - 39 U/L    ALT 95 (H) 7 - 52 U/L    Alkaline Phosphatase 123 (H) 34 - 104 U/L    Total Protein 5.8 (L) 6.4 - 8.4 g/dL    Albumin 2.6 (L) 3.5 - 5.0 g/dL    Total Bilirubin 1.54 (H) 0.20 - 1.00 mg/dL    eGFR 64 ml/min/1.73sq m   Magnesium    Collection Time: 11/15/24  4:38 AM   Result Value Ref Range    Magnesium 2.3 1.9 - 2.7 mg/dL   Phosphorus    Collection Time: 11/15/24  4:38 AM   Result Value Ref Range    Phosphorus 4.0 2.3 - 4.1 mg/dL   CBC and differential    Collection Time: 11/15/24  4:38 AM   Result Value Ref Range    WBC 18.77 (H) 4.31 - 10.16 Thousand/uL    RBC 4.50 3.88 - 5.62 Million/uL    Hemoglobin 12.7 12.0 - 17.0 g/dL    Hematocrit 37.9 36.5 - 49.3 %    MCV 84 82 - 98 fL    MCH 28.2 26.8 - 34.3 pg    MCHC 33.5 31.4 - 37.4 g/dL    RDW 14.5 11.6 - 15.1 %    MPV 10.0 8.9 - 12.7 fL    Platelets 363 149 - 390 Thousands/uL    nRBC 0 /100 WBCs    Segmented % 89 (H) 43 - 75 %    Immature Grans % 2 0 - 2 %    Lymphocytes % 6 (L) 14 - 44 %    Monocytes % 3 (L) 4 - 12 %    Eosinophils  Relative 0 0 - 6 %    Basophils Relative 0 0 - 1 %    Absolute Neutrophils 16.67 (H) 1.85 - 7.62 Thousands/µL    Absolute Immature Grans 0.38 (H) 0.00 - 0.20 Thousand/uL    Absolute Lymphocytes 1.03 0.60 - 4.47 Thousands/µL    Absolute Monocytes 0.64 0.17 - 1.22 Thousand/µL    Eosinophils Absolute 0.01 0.00 - 0.61 Thousand/µL    Basophils Absolute 0.04 0.00 - 0.10 Thousands/µL   Comprehensive metabolic panel    Collection Time: 11/16/24  4:50 AM   Result Value Ref Range    Sodium 139 135 - 147 mmol/L    Potassium 3.4 (L) 3.5 - 5.3 mmol/L    Chloride 102 96 - 108 mmol/L    CO2 28 21 - 32 mmol/L    ANION GAP 9 4 - 13 mmol/L    BUN 21 5 - 25 mg/dL    Creatinine 1.13 0.60 - 1.30 mg/dL    Glucose 106 65 - 140 mg/dL    Calcium 8.0 (L) 8.4 - 10.2 mg/dL    Corrected Calcium 9.0 8.3 - 10.1 mg/dL    AST 57 (H) 13 - 39 U/L    ALT 63 (H) 7 - 52 U/L    Alkaline Phosphatase 113 (H) 34 - 104 U/L    Total Protein 5.7 (L) 6.4 - 8.4 g/dL    Albumin 2.8 (L) 3.5 - 5.0 g/dL    Total Bilirubin 0.86 0.20 - 1.00 mg/dL    eGFR 63 ml/min/1.73sq m   CBC and differential    Collection Time: 11/16/24  4:50 AM   Result Value Ref Range    WBC 17.68 (H) 4.31 - 10.16 Thousand/uL    RBC 4.46 3.88 - 5.62 Million/uL    Hemoglobin 12.5 12.0 - 17.0 g/dL    Hematocrit 38.6 36.5 - 49.3 %    MCV 87 82 - 98 fL    MCH 28.0 26.8 - 34.3 pg    MCHC 32.4 31.4 - 37.4 g/dL    RDW 14.7 11.6 - 15.1 %    MPV 10.3 8.9 - 12.7 fL    Platelets 421 (H) 149 - 390 Thousands/uL

## 2024-11-16 NOTE — ASSESSMENT & PLAN NOTE
Bld culture x1 +coagulase-negative Staphylococcus   1 set of blood cultures is positive, this may represent a contaminant.   Will continue to follow second set of cultures  Continue IV  zosyn for now

## 2024-11-16 NOTE — ASSESSMENT & PLAN NOTE
74y M POD2 s/p laparoscopic cholecystectomy for emphysematous gallbladder with perforation  - Intra-abdominal cultures pending - growing 3+ gram pos and 1+ gram neg.   - Blood cultures 1/2 growing gram pos. cocci    WBC 17.6 from 18.7, remainder of labs unremarkable  TAM drain 40    Plan  Continue regular diet as tolerated.  Will plan for discharge once cleared by medicine.   Continue IV antibiotics  Monitor and record TAM drain output.  Plan for removal prior to discharge.  Monitor abdominal exam, labs, vitals  PRN pain medication and anti-emetics  Encourage ambulation  DVT ppx: heparin  Incentive spirometry 10 times/hour while awake  Continue home medications as prescribed   General Surgery is primary

## 2024-11-16 NOTE — NURSING NOTE
While walking patient around unit pt destat at 80-85%. Placed pt on 2-3 liters NC and was brought back to the room. RADHA made aware.      Currently stating at 90-95% on 2L NC. Pt currently stable. Call bell within reach

## 2024-11-16 NOTE — NURSING NOTE
While walking patient around unit pt destat at 80-85%. Placed pt on 2-3 liters NC and was brought back to the room. RADHA made aware.     Currently stating at 90-95% on 2L NC

## 2024-11-17 VITALS
SYSTOLIC BLOOD PRESSURE: 134 MMHG | WEIGHT: 222.44 LBS | HEART RATE: 68 BPM | TEMPERATURE: 97.9 F | BODY MASS INDEX: 34.91 KG/M2 | DIASTOLIC BLOOD PRESSURE: 81 MMHG | RESPIRATION RATE: 16 BRPM | OXYGEN SATURATION: 95 % | HEIGHT: 67 IN

## 2024-11-17 PROBLEM — R74.01 TRANSAMINITIS: Status: RESOLVED | Noted: 2024-11-16 | Resolved: 2024-11-17

## 2024-11-17 PROBLEM — J96.01 ACUTE RESPIRATORY FAILURE WITH HYPOXIA (HCC): Status: RESOLVED | Noted: 2024-11-16 | Resolved: 2024-11-17

## 2024-11-17 PROBLEM — K81.0 EMPHYSEMATOUS CHOLECYSTITIS: Status: RESOLVED | Noted: 2024-11-14 | Resolved: 2024-11-17

## 2024-11-17 LAB
ALBUMIN SERPL BCG-MCNC: 2.6 G/DL (ref 3.5–5)
ANION GAP SERPL CALCULATED.3IONS-SCNC: 7 MMOL/L (ref 4–13)
BACTERIA BLD CULT: ABNORMAL
BACTERIA SPEC BFLD CULT: NO GROWTH
BUN SERPL-MCNC: 18 MG/DL (ref 5–25)
CALCIUM SERPL-MCNC: 7.6 MG/DL (ref 8.4–10.2)
CHLORIDE SERPL-SCNC: 106 MMOL/L (ref 96–108)
CO2 SERPL-SCNC: 27 MMOL/L (ref 21–32)
CREAT SERPL-MCNC: 1.1 MG/DL (ref 0.6–1.3)
ERYTHROCYTE [DISTWIDTH] IN BLOOD BY AUTOMATED COUNT: 15 % (ref 11.6–15.1)
GFR SERPL CREATININE-BSD FRML MDRD: 65 ML/MIN/1.73SQ M
GLUCOSE SERPL-MCNC: 97 MG/DL (ref 65–140)
GRAM STN SPEC: ABNORMAL
HCT VFR BLD AUTO: 39.4 % (ref 36.5–49.3)
HGB BLD-MCNC: 12.1 G/DL (ref 12–17)
MCH RBC QN AUTO: 27 PG (ref 26.8–34.3)
MCHC RBC AUTO-ENTMCNC: 30.7 G/DL (ref 31.4–37.4)
MCV RBC AUTO: 88 FL (ref 82–98)
NRBC BLD AUTO-RTO: 0 /100 WBCS
PLATELET # BLD AUTO: 437 THOUSANDS/UL (ref 149–390)
PMV BLD AUTO: 9.9 FL (ref 8.9–12.7)
POTASSIUM SERPL-SCNC: 3.9 MMOL/L (ref 3.5–5.3)
RBC # BLD AUTO: 4.48 MILLION/UL (ref 3.88–5.62)
S AUREUS+CONS DNA BLD POS NAA+NON-PROBE: DETECTED
SODIUM SERPL-SCNC: 140 MMOL/L (ref 135–147)
WBC # BLD AUTO: 12.89 THOUSAND/UL (ref 4.31–10.16)

## 2024-11-17 PROCEDURE — 85027 COMPLETE CBC AUTOMATED: CPT

## 2024-11-17 PROCEDURE — 82040 ASSAY OF SERUM ALBUMIN: CPT

## 2024-11-17 PROCEDURE — 99232 SBSQ HOSP IP/OBS MODERATE 35: CPT | Performed by: FAMILY MEDICINE

## 2024-11-17 PROCEDURE — 80048 BASIC METABOLIC PNL TOTAL CA: CPT

## 2024-11-17 PROCEDURE — 99024 POSTOP FOLLOW-UP VISIT: CPT

## 2024-11-17 PROCEDURE — 97162 PT EVAL MOD COMPLEX 30 MIN: CPT

## 2024-11-17 RX ORDER — HYDROCODONE BITARTRATE AND ACETAMINOPHEN 5; 325 MG/1; MG/1
1 TABLET ORAL EVERY 6 HOURS PRN
Qty: 10 TABLET | Refills: 0 | Status: SHIPPED | OUTPATIENT
Start: 2024-11-17 | End: 2024-11-27

## 2024-11-17 RX ADMIN — AMLODIPINE BESYLATE 2.5 MG: 2.5 TABLET ORAL at 09:41

## 2024-11-17 RX ADMIN — PANTOPRAZOLE SODIUM 40 MG: 40 TABLET, DELAYED RELEASE ORAL at 05:40

## 2024-11-17 RX ADMIN — HEPARIN SODIUM 5000 UNITS: 5000 INJECTION INTRAVENOUS; SUBCUTANEOUS at 05:40

## 2024-11-17 RX ADMIN — LEVOTHYROXINE SODIUM 25 MCG: 0.03 TABLET ORAL at 05:40

## 2024-11-17 RX ADMIN — PIPERACILLIN AND TAZOBACTAM 4.5 G: 36; 4.5 INJECTION, POWDER, FOR SOLUTION INTRAVENOUS at 09:43

## 2024-11-17 RX ADMIN — FLUOXETINE HYDROCHLORIDE 20 MG: 20 CAPSULE ORAL at 09:41

## 2024-11-17 RX ADMIN — LISINOPRIL 10 MG: 10 TABLET ORAL at 09:41

## 2024-11-17 NOTE — ASSESSMENT & PLAN NOTE
Patient presented with nausea, abdominal pain, fatigue for 1 week  CT scan reveals:   emphysematous/gangrenous cholecystitis complicated by perforation as evidenced by a 5.6 x 5.5 x 3.8 cm collection contiguous with the medial gallbladder wall and pneumoperitoneum.   S/P Laparoscopic Cholecystectomy with TAM drain placement on 11/14  Pain Management  Antibiotics per primary team  Tolerating diet  Remainder of plan per primary team

## 2024-11-17 NOTE — PROGRESS NOTES
Progress Note - Hospitalist   Name: Melchor Adrian 74 y.o. male I MRN: [id number]  Unit/Bed#: -01 I Date of Admission: 11/14/2024   Date of Service: 11/17/2024 I Hospital Day: 2    Assessment & Plan  Emphysematous cholecystitis  Patient presented with nausea, abdominal pain, fatigue for 1 week  CT scan reveals:   emphysematous/gangrenous cholecystitis complicated by perforation as evidenced by a 5.6 x 5.5 x 3.8 cm collection contiguous with the medial gallbladder wall and pneumoperitoneum.   S/P Laparoscopic Cholecystectomy with TAM drain placement on 11/14  Pain Management  Antibiotics per primary team  Tolerating diet  Remainder of plan per primary team  Acute respiratory failure with hypoxia (HCC)  Patient with post op hypoxia, Desaturation to 87-89% on RA  SpO2 now 93% on RA, denies SOB or dyspnea on exertion  CXR: Low lung volumes with bibasilar atelectasis.   Continue IS every hour while awake  Encourage cough and deep breathing  Ok for discharge with follow by primary in 7 days    Hypertension  BP: 134/81    Plan:  Continue amlodipine, lisinopril  Blood pressure remains stable  Gastroesophageal reflux disease  Continue pantoprazole  Hyperlipidemia  Continue statin  Posttraumatic stress disorder  Mood stable  Continue fluoxetine  Hypothyroid  Continue POA levothyroxine  Transaminitis  Lab Results   Component Value Date    AST 57 (H) 11/16/2024     (H) 11/15/2024    ALT 63 (H) 11/16/2024    ALT 95 (H) 11/15/2024    TBILI 0.86 11/16/2024    TBILI 1.54 (H) 11/15/2024   Likely secondary to cholecystitis  Now down trending  Patient without nausea, vomiting, abdominal pain  Monitor liver function  Repeat liver enzymes outpatient within 1 week    Positive blood culture  Bld culture x1 +coagulase-negative Staphylococcus   1 set of blood cultures is positive, likely represents a contaminant.       VTE Pharmacologic Prophylaxis: VTE Score: 6 High Risk (Score >/= 5) - Pharmacological DVT Prophylaxis Ordered:  heparin. Sequential Compression Devices Ordered.    Mobility:   Basic Mobility Inpatient Raw Score: 24  JH-HLM Goal: 8: Walk 250 feet or more  JH-HLM Achieved: 7: Walk 25 feet or more  JH-HLM Goal NOT achieved. Continue with multidisciplinary rounding and encourage appropriate mobility to improve upon JH-HLM goals.    Patient Centered Rounds: I performed bedside rounds with nursing staff today.   Discussions with Specialists or Other Care Team Provider:       Education and Discussions with Family / Patient: Patient declined call to .     Current Length of Stay: 2 day(s)  Current Patient Status: Inpatient   Certification Statement:  plan for discharge today  Discharge Plan: [facility] is following this patient on consult. They are medically stable for discharge when deemed appropriate by primary service.    Code Status: Level 1 - Full Code    Subjective   Patient seen and examined resting comfortably in bed. No complaints to offer. Eager to go home.     Objective :  Temp:  [97.7 °F (36.5 °C)-97.9 °F (36.6 °C)] 97.9 °F (36.6 °C)  HR:  [66-70] 68  BP: (129-137)/(71-81) 134/81  Resp:  [16-18] 16  SpO2:  [93 %-95 %] 95 %    Body mass index is 34.84 kg/m².     Input and Output Summary (last 24 hours):     Intake/Output Summary (Last 24 hours) at 11/17/2024 1121  Last data filed at 11/17/2024 0501  Gross per 24 hour   Intake 510 ml   Output 1415 ml   Net -905 ml       Physical Exam  Vitals and nursing note reviewed.   Constitutional:       General: He is not in acute distress.     Appearance: He is obese.   HENT:      Head: Normocephalic.      Mouth/Throat:      Mouth: Mucous membranes are moist.   Eyes:      Conjunctiva/sclera: Conjunctivae normal.   Cardiovascular:      Rate and Rhythm: Normal rate and regular rhythm.      Pulses: Normal pulses.      Heart sounds: Normal heart sounds. No murmur heard.  Pulmonary:      Effort: Pulmonary effort is normal. No respiratory distress.      Breath sounds: Normal breath  sounds. No wheezing or rhonchi.   Abdominal:      Palpations: Abdomen is soft.      Tenderness: There is abdominal tenderness.      Comments: TAM drain in place    Musculoskeletal:         General: No swelling.   Skin:     General: Skin is warm.      Capillary Refill: Capillary refill takes less than 2 seconds.   Neurological:      Mental Status: He is alert and oriented to person, place, and time.      Motor: No weakness.   Psychiatric:         Mood and Affect: Mood normal.         Behavior: Behavior normal.         Thought Content: Thought content normal.           Lines/Drains:  Lines/Drains/Airways       Active Status       Name Placement date Placement time Site Days    Closed/Suction Drain Left LLQ Bulb 10 Fr. 11/14/24  1656  LLQ  2                            Lab Results: I have reviewed the following results:   Results from last 7 days   Lab Units 11/17/24  0442 11/16/24  0450 11/15/24  0438 11/14/24  0931   WBC Thousand/uL 12.89*   < > 18.77* 18.01*   HEMOGLOBIN g/dL 12.1   < > 12.7 14.7   HEMATOCRIT % 39.4   < > 37.9 45.8   PLATELETS Thousands/uL 437*   < > 363 367   BANDS PCT %  --   --   --  2   SEGS PCT %  --   --  89*  --    LYMPHO PCT %  --   --  6* 12*   MONO PCT %  --   --  3* 6   EOS PCT %  --   --  0 1    < > = values in this interval not displayed.     Results from last 7 days   Lab Units 11/17/24  0442 11/16/24  0450   SODIUM mmol/L 140 139   POTASSIUM mmol/L 3.9 3.4*   CHLORIDE mmol/L 106 102   CO2 mmol/L 27 28   BUN mg/dL 18 21   CREATININE mg/dL 1.10 1.13   ANION GAP mmol/L 7 9   CALCIUM mg/dL 7.6* 8.0*   ALBUMIN g/dL 2.6* 2.8*   TOTAL BILIRUBIN mg/dL  --  0.86   ALK PHOS U/L  --  113*   ALT U/L  --  63*   AST U/L  --  57*   GLUCOSE RANDOM mg/dL 97 106                 Results from last 7 days   Lab Units 11/14/24  1132   LACTIC ACID mmol/L 1.0       Recent Cultures (last 7 days):   Results from last 7 days   Lab Units 11/14/24  1605 11/14/24  1132 11/14/24  1127   BLOOD CULTURE   --   Staphylococcus coagulase negative* No Growth at 48 hrs.   GRAM STAIN RESULT  3+ Polys*  3+ Gram positive rods*  1+ Gram negative rods* Gram positive cocci in clusters*  --    BODY FLUID CULTURE, STERILE  No growth  --   --        Imaging Results Review: No pertinent imaging studies reviewed.  Other Study Results Review: No additional pertinent studies reviewed.    Last 24 Hours Medication List:     Current Facility-Administered Medications:     acetaminophen (TYLENOL) tablet 650 mg, Q6H PRN    albuterol inhalation solution 2.5 mg, Q4H PRN    amLODIPine (NORVASC) tablet 2.5 mg, Daily **AND** lisinopril (ZESTRIL) tablet 10 mg, Daily    FLUoxetine (PROzac) capsule 20 mg, Daily    heparin (porcine) subcutaneous injection 5,000 Units, Q8H ASIF    HYDROmorphone (DILAUDID) injection 0.2 mg, Q2H PRN    levothyroxine tablet 25 mcg, Early Morning    ondansetron (ZOFRAN) injection 4 mg, Q6H PRN    oxyCODONE (ROXICODONE) IR tablet 5 mg, Q4H PRN    pantoprazole (PROTONIX) EC tablet 40 mg, Early Morning    piperacillin-tazobactam (ZOSYN) IVPB (EXTENDED INFUSION) 4.5 g, Q8H    pravastatin (PRAVACHOL) tablet 40 mg, Daily With Dinner    traMADol (ULTRAM) tablet 50 mg, Q6H PRN    Administrative Statements   Today, Patient Was Seen By: SHELLY Lopez  I have spent a total time of 32 minutes in caring for this patient on the day of the visit/encounter including Diagnostic results, Risks and benefits of tx options, Instructions for management, Patient and family education, Importance of tx compliance, Impressions, Counseling / Coordination of care, Documenting in the medical record, Reviewing / ordering tests, medicine, procedures  , Obtaining or reviewing history  , and Communicating with other healthcare professionals .    **Please Note: This note may have been constructed using a voice recognition system.**   clear

## 2024-11-17 NOTE — PHYSICAL THERAPY NOTE
Physical Therapy Evaluation     Patient's Name: Melchor Adrian    Admitting Diagnosis  Acute emphysematous cholecystitis [K81.0]  Gangrenous cholecystitis [K81.0]  Abdominal pain [R10.9]    Problem List  Patient Active Problem List   Diagnosis    Sebaceous cyst    Soft tissue mass    Emphysematous cholecystitis    Hypertension    Gastroesophageal reflux disease    Hyperlipidemia    Posttraumatic stress disorder    Mixed hyperlipidemia    Post-traumatic stress disorder, unspecified    Hypothyroid    Transaminitis    Positive blood culture    Acute respiratory failure with hypoxia (HCC)       Past Medical History  Past Medical History:   Diagnosis Date    Basal cell carcinoma     Left Upper back    GERD (gastroesophageal reflux disease)     Hyperlipidemia     Hypertension     PTSD (post-traumatic stress disorder)        Past Surgical History  Past Surgical History:   Procedure Laterality Date    CHOLECYSTECTOMY LAPAROSCOPIC N/A 11/14/2024    Procedure: CHOLECYSTECTOMY LAPAROSCOPIC;  Surgeon: Law Lew DO;  Location: [facility];  Service: General    COLONOSCOPY      KNEE SURGERY      MASS EXCISION N/A 5/27/2020    Procedure: EXCISION BIOPSY TISSUE LESION/MASS RIGHT BUTTOCK;  Surgeon: Tez Erickson MD;  Location: [facility];  Service: General          11/17/24 1024   PT Last Visit   PT Visit Date 11/17/24   Note Type   Note type Evaluation   Pain Assessment   Pain Assessment Tool 0-10   Pain Score No Pain   Restrictions/Precautions   Weight Bearing Precautions Per Order No   Home Living   Type of Home House   Home Layout Two level;Performs ADLs on one level;Bed/bath upstairs;Stairs to enter with rails  (2 NIK)   Home Equipment Cane;Walker  (no AD used at baseline, cane used prn)   Prior Function   Level of [place] Independent with ADLs;Independent with functional mobility;Independent with IADLS   Lives With Alone   Receives Help From Neighbor;Family   IADLs Independent with driving;Independent with  meal prep;Independent with medication management   Falls in the last 6 months 0   Vocational Retired  ()   General   Family/Caregiver Present No   Cognition   Overall Cognitive Status WFL   Arousal/Participation Alert   Orientation Level Oriented X4   Memory Within functional limits   Following Commands Follows all commands and directions without difficulty   Comments pt agreeable to PT evaluation   RLE Assessment   RLE Assessment   (Grossly 4/5 observed with functional mobility)   LLE Assessment   LLE Assessment   (Grossly 4/5 observed with functional mobility)   Coordination   Movements are Fluid and Coordinated 1   Sensation WFL   Bed Mobility   Supine to Sit 5  Supervision   Additional items Assist x 1;HOB elevated;Increased time required;Verbal cues   Sit to Supine 5  Supervision   Additional items Assist x 1;HOB elevated;Increased time required;Verbal cues   Transfers   Sit to Stand 5  Supervision   Additional items Assist x 1;Increased time required;Verbal cues   Stand to Sit 5  Supervision   Additional items Assist x 1;Increased time required;Verbal cues   Ambulation/Elevation   Gait pattern Inconsistent [place];Short stride;Decreased hip extension   Gait Assistance 5  Supervision  (CGA with narrow based turns)   Additional items Assist x 1;Verbal cues   Assistive Device None   Distance 100'   Balance   Static Sitting Good   Dynamic Sitting Fair +   Static Standing Fair   Dynamic Standing Fair -   Ambulatory Fair -   Endurance Deficit   Endurance Deficit Yes   Endurance Deficit Description SPO2 reading 90% on RA during level surface gait trial   Activity Tolerance   Activity Tolerance Patient tolerated treatment well   Medical Staff Made Aware CM Jessica   Nurse Made Aware [facility]   Assessment   Prognosis Good   Problem List Decreased strength;Decreased endurance;Impaired balance;Decreased mobility   Assessment Pt is 74 y.o. male seen for PT evaluation on 11/17/2024 s/p admit to [facility] on 11/14/2024 w/ Emphysematous cholecystitis. PT was consulted to assess pt's functional mobility and d/c needs. Order placed for PT eval and tx. PTA, pt resides alone in H with 2 NIK, ambulates without AD. At time of eval, pt performing bed mobility, transfers, level surface gait trial with SBA. Upon evaluation, pt presenting with impaired functional mobility d/t decreased strength, decreased endurance, impaired balance, and decreased mobility. Pertinent PMHx and current co-morbidities affecting pt's physical performance at time of assessment include: HTN, GERD, HLD, PTSD, hypothyroid, transaminitis, positive blood culture, soft tissue mass. Personal factors affecting pt at time of eval include: lives in 2 story house and stairs to enter home. The following objective measures performed on IE also reveal limitations: Barthel Index: 60/100, Modified [place]: 2 (slight disability), and AM-PAC 6-Clicks: 20/24. Pt's clinical presentation is currently evolving seen in pt's presentation of abnormal lab value(s) and ongoing medical assessment. Overall, pt's rehab potential and prognosis to return to PLOF is good as impacted by objective findings, warranting pt to receive further skilled PT interventions to address identified impairments, activity limitation(s), and participation restriction(s). Pt to benefit from continued PT tx to address deficits as defined above and maximize level of functional independent mobility. From PT/mobility standpoint, recommend level 3, minimal resource intensity in order to facilitate return to PLOF.   Barriers to Discharge None   Goals   Patient Goals to go home today   STG Expiration Date 11/27/24   Short Term Goal #1 In 7-10 days: Increase bilateral LE strength 1/2 grade to facilitate independent mobility, Perform all bed mobility tasks independently to decrease caregiver burden, Perform all transfers independently to improve independence, Ambulate > 250 ft. with least restrictive  assistive device modified independent w/o LOB and w/ normalized gait pattern 100% of the time, Navigate 13 stair(s) modified independent with unilateral handrail to facilitate return to previous living environment, and Increase all balance 1/2 grade to decrease risk for falls   Plan   Treatment/Interventions LE strengthening/ROM;Therapeutic exercise;Endurance training;Patient/family training;Gait training;Spoke to nursing;Spoke to case management   PT Frequency 1-2x/wk   Discharge Recommendation   Rehab Resource Intensity Level, PT III (Minimum Resource Intensity)   AM-PAC Basic Mobility Inpatient   Turning in Flat Bed Without Bedrails 4   Lying on Back to Sitting on Edge of Flat Bed Without Bedrails 4   Moving Bed to Chair 3   Standing Up From Chair Using Arms 3   Walk in Room 3   Climb 3-5 Stairs With Railing 3   Basic Mobility Inpatient Raw Score 20   Basic Mobility Standardized Score 43.99   [facility] Highest Level Of Mobility   -HLM Goal 6: Walk 10 steps or more   JH-HLM Achieved 7: Walk 25 feet or more   Modified [place] Scale   Modified [place] Scale 2   Barthel Index   Feeding 10   Bathing 5   Grooming Score 5   Dressing Score 5   Bladder Score 10   Bowels Score 10   Toilet Use Score 5   Transfers (Bed/Chair) Score 10   Mobility (Level Surface) Score 0   Stairs Score 0   Barthel Index Score 60         Bren Monroy, PT

## 2024-11-17 NOTE — ASSESSMENT & PLAN NOTE
Lab Results   Component Value Date    AST 57 (H) 11/16/2024     (H) 11/15/2024    ALT 63 (H) 11/16/2024    ALT 95 (H) 11/15/2024    TBILI 0.86 11/16/2024    TBILI 1.54 (H) 11/15/2024   Likely secondary to cholecystitis  Now down trending  Patient without nausea, vomiting, abdominal pain  Monitor liver function  Repeat liver enzymes outpatient within 1 week

## 2024-11-17 NOTE — ASSESSMENT & PLAN NOTE
Bld culture x1 +coagulase-negative Staphylococcus   1 set of blood cultures is positive, likely represents a contaminant.

## 2024-11-17 NOTE — DISCHARGE SUMMARY
"Discharge Summary - Surgery-General   Name: Melchor Adrian 74 y.o. male I MRN: [id number]  Unit/Bed#: -01 I Date of Admission: 11/14/2024   Date of Service: 11/17/2024 I Hospital Day: 2    Admission Date: 11/14/2024 0855  Discharge Date: 11/17/24  Admitting Diagnosis: Acute emphysematous cholecystitis [K81.0]  Gangrenous cholecystitis [K81.0]  Abdominal pain [R10.9]  Discharge Diagnosis:   Medical Problems       Resolved Problems  Date Reviewed: 11/9/2024          Resolved    * (Principal) Emphysematous cholecystitis 11/17/2024     Resolved by  Gentry Arnold PA-C    Transaminitis 11/17/2024     Resolved by  Gentry Arnold PA-C    Acute respiratory failure with hypoxia (HCC) 11/17/2024     Resolved by  Gentry Arnold PA-C          HPI: As per HPI written on admission by Guera Toro, \"Melchor Adrian is a 74 y.o. male who presents with abdominal pain, nausea, vomiting and generalized fatigue.  Patient states that after having a hamburger at [facility] last week he developed abdominal pain, nausea, vomiting and diarrhea.  He was seen in urgent care on 11/9/2024 and felt to have food poisoning.  He was sent home with supportive care.  He states that the next 2 days he developed more intense abdominal pain and on ability to tolerate p.o. intake.  He also noted more severe pain on his right side making it hard to breathe if he laid on that side.  He admits to generalized fatigue and weakness and finally prompted to come to the emergency department for evaluation today.  Patient states he has not been able to tolerate anything p.o. for the last few days.  His last bowel movement was 2 days ago.  He is passing flatus.  He denies subjective fevers or chills.  He has no history of abdominal surgeries.  Most of his care is at [facility].\"    Procedures Performed: No orders of the defined types were placed in this encounter.      Summary of Hospital Course: Hospital Course: No notes on file " Melchor Adrian is a 74 y.o. male who presented on 11/14/2024 with acute abdominal pain.  CTAP demonstrated acute cholecystitis.  Patient was taken to the OR for laparoscopic cholecystectomy at which time the gallbladder was found to be gangrenous and perforated.  He remained inpatient for IV antibiotics as well as monitoring of the TAM drain.  TAM drain was removed prior to discharge.  Patient was discharged on p.o. antibiotics to complete a 7-day course. Vital signs are stable and laboratory work is within normal limits. Tolerating a regular diet and voiding spontaneously. Bowel function is present. Ambulating without difficulty. Pain is well controlled on oral pain medication. We discussed outpatient follow-up with general surgery in 2 weeks. The patient was educated on the diagnosis and treatment plan and all questions were adequately answered. The patient was deemed appropriate for discharge in the care of family on 11/17/2024.    Significant Findings, Care, Treatment and Services Provided: Laparoscopic cholecystectomy    Complications: N/A    Condition at Discharge: stable       Discharge instructions/Information to patient and family:   See After Visit Summary (AVS) for information provided to patient and family.      Provisions for Follow-Up Care:  See after visit summary for information related to follow-up care and any pertinent home health orders.      PCP: No primary care provider on file.    Disposition: Home    Planned Readmission: No     Discharge Medications:  See after visit summary for reconciled discharge medications provided to patient and family.      Discharge Statement:  I have spent a total time of 30 minutes in caring for this patient on the day of the visit/encounter. .

## 2024-11-17 NOTE — ASSESSMENT & PLAN NOTE
Patient with post op hypoxia, Desaturation to 87-89% on RA  SpO2 now 93% on RA, denies SOB or dyspnea on exertion  CXR: Low lung volumes with bibasilar atelectasis.   Continue IS every hour while awake  Encourage cough and deep breathing  Ok for discharge with follow by primary in 7 days

## 2024-11-17 NOTE — PLAN OF CARE
Problem: PHYSICAL THERAPY ADULT  Goal: Performs mobility at highest level of function for planned discharge setting.  See evaluation for individualized goals.  Description: Treatment/Interventions: LE strengthening/ROM, Therapeutic exercise, Endurance training, Patient/family training, Gait training, Spoke to nursing, Spoke to case management          See flowsheet documentation for full assessment, interventions and recommendations.  11/17/2024 1154 by Bren Monroy PT  Note: Prognosis: Good  Problem List: Decreased strength, Decreased endurance, Impaired balance, Decreased mobility  Assessment: Pt is 74 y.o. male seen for PT evaluation on 11/17/2024 s/p admit to [facility] on 11/14/2024 w/ Emphysematous cholecystitis. PT was consulted to assess pt's functional mobility and d/c needs. Order placed for PT eval and tx. PTA, pt resides alone in 2SH with 2 NIK, ambulates without AD. At time of eval, pt performing bed mobility, transfers, level surface gait trial with SBA. Upon evaluation, pt presenting with impaired functional mobility d/t decreased strength, decreased endurance, impaired balance, and decreased mobility. Pertinent PMHx and current co-morbidities affecting pt's physical performance at time of assessment include: HTN, GERD, HLD, PTSD, hypothyroid, transaminitis, positive blood culture, soft tissue mass. Personal factors affecting pt at time of eval include: lives in 2 story house and stairs to enter home. The following objective measures performed on IE also reveal limitations: Barthel Index: 60/100, Modified [place]: 2 (slight disability), and AM-PAC 6-Clicks: 20/24. Pt's clinical presentation is currently evolving seen in pt's presentation of abnormal lab value(s) and ongoing medical assessment. Overall, pt's rehab potential and prognosis to return to PLOF is good as impacted by objective findings, warranting pt to receive further skilled PT interventions to address identified impairments,  activity limitation(s), and participation restriction(s). Pt to benefit from continued PT tx to address deficits as defined above and maximize level of functional independent mobility. From PT/mobility standpoint, recommend level 3, minimal resource intensity in order to facilitate return to PLOF.  Barriers to Discharge: None     Rehab Resource Intensity Level, PT: III (Minimum Resource Intensity)    See flowsheet documentation for full assessment.     11/17/2024 1153 by Bren Monroy PT  Note: Prognosis: Good  Problem List: Decreased strength, Decreased endurance, Impaired balance, Decreased mobility  Assessment: Pt is 74 y.o. male seen for PT evaluation on 11/17/2024 s/p admit to [facility] on 11/14/2024 w/ Emphysematous cholecystitis. PT was consulted to assess pt's functional mobility and d/c needs. Order placed for PT eval and tx. PTA, pt resides alone in 2SH with 2 NIK, ambulates without AD. At time of eval, pt performing bed mobility, transfers, level surface gait trial with SBA. Upon evaluation, pt presenting with impaired functional mobility d/t decreased strength, decreased endurance, impaired balance, and decreased mobility. Pertinent PMHx and current co-morbidities affecting pt's physical performance at time of assessment include: HTN, GERD, HLD, PTSD, hypothyroid, transaminitis, positive blood culture, soft tissue mass. Personal factors affecting pt at time of eval include: lives in 2 story house and stairs to enter home. The following objective measures performed on IE also reveal limitations: Barthel Index: 60/100, Modified [place]: 2 (slight disability), and AM-PAC 6-Clicks: 20/24. Pt's clinical presentation is currently evolving seen in pt's presentation of abnormal lab value(s) and ongoing medical assessment. Overall, pt's rehab potential and prognosis to return to PLOF is good as impacted by objective findings, warranting pt to receive further skilled PT interventions to address identified  impairments, activity limitation(s), and participation restriction(s). Pt to benefit from continued PT tx to address deficits as defined above and maximize level of functional independent mobility. From PT/mobility standpoint, recommend level 3, minimal resource intensity in order to facilitate return to PLOF.  Barriers to Discharge: None     Rehab Resource Intensity Level, PT: III (Minimum Resource Intensity)    See flowsheet documentation for full assessment.

## 2024-11-19 ENCOUNTER — RESULTS FOLLOW-UP (OUTPATIENT)
Dept: EMERGENCY DEPT | Facility: HOSPITAL | Age: 74
End: 2024-11-19

## 2024-11-19 LAB
ALL TARGETS: NOT DETECTED
BACTERIA BLD CULT: ABNORMAL
GRAM STN SPEC: ABNORMAL

## 2024-11-19 NOTE — RESULT ENCOUNTER NOTE
Patient on Augmentin and is susceptible
Patient on Augmentin, awaiting culture and sensitivities
Eyeglasses

## 2024-11-21 ENCOUNTER — APPOINTMENT (EMERGENCY)
Dept: CT IMAGING | Facility: HOSPITAL | Age: 74
DRG: 694 | End: 2024-11-21
Payer: COMMERCIAL

## 2024-11-21 ENCOUNTER — HOSPITAL ENCOUNTER (INPATIENT)
Facility: HOSPITAL | Age: 74
LOS: 1 days | Discharge: HOME HEALTH | DRG: 694 | End: 2024-11-24
Attending: EMERGENCY MEDICINE | Admitting: INTERNAL MEDICINE
Payer: COMMERCIAL

## 2024-11-21 DIAGNOSIS — N17.9 AKI (ACUTE KIDNEY INJURY) (HCC): ICD-10-CM

## 2024-11-21 DIAGNOSIS — R33.9 URINARY RETENTION: ICD-10-CM

## 2024-11-21 DIAGNOSIS — R10.30 LOWER ABDOMINAL PAIN: ICD-10-CM

## 2024-11-21 DIAGNOSIS — R33.8 ACUTE URINARY RETENTION: Primary | ICD-10-CM

## 2024-11-21 DIAGNOSIS — R11.0 NAUSEA: ICD-10-CM

## 2024-11-21 DIAGNOSIS — E87.1 HYPONATREMIA: ICD-10-CM

## 2024-11-21 DIAGNOSIS — K59.00 CONSTIPATION: ICD-10-CM

## 2024-11-21 DIAGNOSIS — N13.30 HYDROURETERONEPHROSIS: ICD-10-CM

## 2024-11-21 DIAGNOSIS — D72.829 LEUKOCYTOSIS: ICD-10-CM

## 2024-11-21 LAB
ALBUMIN SERPL BCG-MCNC: 3.2 G/DL (ref 3.5–5)
ALP SERPL-CCNC: 102 U/L (ref 34–104)
ALT SERPL W P-5'-P-CCNC: 81 U/L (ref 7–52)
ANION GAP SERPL CALCULATED.3IONS-SCNC: 12 MMOL/L (ref 4–13)
APTT PPP: 28 SECONDS (ref 23–34)
AST SERPL W P-5'-P-CCNC: 43 U/L (ref 13–39)
BASOPHILS # BLD MANUAL: 0 THOUSAND/UL (ref 0–0.1)
BASOPHILS NFR MAR MANUAL: 0 % (ref 0–1)
BILIRUB SERPL-MCNC: 1.36 MG/DL (ref 0.2–1)
BUN SERPL-MCNC: 17 MG/DL (ref 5–25)
CALCIUM ALBUM COR SERPL-MCNC: 8.9 MG/DL (ref 8.3–10.1)
CALCIUM SERPL-MCNC: 8.3 MG/DL (ref 8.4–10.2)
CHLORIDE SERPL-SCNC: 97 MMOL/L (ref 96–108)
CO2 SERPL-SCNC: 19 MMOL/L (ref 21–32)
CREAT SERPL-MCNC: 1.67 MG/DL (ref 0.6–1.3)
EOSINOPHIL # BLD MANUAL: 0 THOUSAND/UL (ref 0–0.4)
EOSINOPHIL NFR BLD MANUAL: 0 % (ref 0–6)
ERYTHROCYTE [DISTWIDTH] IN BLOOD BY AUTOMATED COUNT: 14.3 % (ref 11.6–15.1)
GFR SERPL CREATININE-BSD FRML MDRD: 39 ML/MIN/1.73SQ M
GLUCOSE SERPL-MCNC: 147 MG/DL (ref 65–140)
HCT VFR BLD AUTO: 43.2 % (ref 36.5–49.3)
HGB BLD-MCNC: 14.3 G/DL (ref 12–17)
INR PPP: 1.08 (ref 0.85–1.19)
LACTATE SERPL-SCNC: 1.2 MMOL/L (ref 0.5–2)
LIPASE SERPL-CCNC: 12 U/L (ref 11–82)
LYMPHOCYTES # BLD AUTO: 0.88 THOUSAND/UL (ref 0.6–4.47)
LYMPHOCYTES # BLD AUTO: 3 % (ref 14–44)
MCH RBC QN AUTO: 28.1 PG (ref 26.8–34.3)
MCHC RBC AUTO-ENTMCNC: 33.1 G/DL (ref 31.4–37.4)
MCV RBC AUTO: 85 FL (ref 82–98)
MONOCYTES # BLD AUTO: 1.17 THOUSAND/UL (ref 0–1.22)
MONOCYTES NFR BLD: 4 % (ref 4–12)
MYELOCYTE ABSOLUTE CT: 0.29 THOUSAND/UL (ref 0–0.1)
MYELOCYTES NFR BLD MANUAL: 1 % (ref 0–1)
NEUTROPHILS # BLD MANUAL: 26.93 THOUSAND/UL (ref 1.85–7.62)
NEUTS SEG NFR BLD AUTO: 92 % (ref 43–75)
PLATELET # BLD AUTO: 702 THOUSANDS/UL (ref 149–390)
PLATELET BLD QL SMEAR: ABNORMAL
PMV BLD AUTO: 9.3 FL (ref 8.9–12.7)
POTASSIUM SERPL-SCNC: 4 MMOL/L (ref 3.5–5.3)
PROCALCITONIN SERPL-MCNC: 0.15 NG/ML
PROT SERPL-MCNC: 6.6 G/DL (ref 6.4–8.4)
PROTHROMBIN TIME: 14.7 SECONDS (ref 12.3–15)
RBC # BLD AUTO: 5.08 MILLION/UL (ref 3.88–5.62)
SODIUM SERPL-SCNC: 128 MMOL/L (ref 135–147)
WBC # BLD AUTO: 29.27 THOUSAND/UL (ref 4.31–10.16)

## 2024-11-21 PROCEDURE — 87040 BLOOD CULTURE FOR BACTERIA: CPT | Performed by: EMERGENCY MEDICINE

## 2024-11-21 PROCEDURE — 99285 EMERGENCY DEPT VISIT HI MDM: CPT | Performed by: EMERGENCY MEDICINE

## 2024-11-21 PROCEDURE — 85730 THROMBOPLASTIN TIME PARTIAL: CPT | Performed by: EMERGENCY MEDICINE

## 2024-11-21 PROCEDURE — 83605 ASSAY OF LACTIC ACID: CPT | Performed by: EMERGENCY MEDICINE

## 2024-11-21 PROCEDURE — 74177 CT ABD & PELVIS W/CONTRAST: CPT

## 2024-11-21 PROCEDURE — 96375 TX/PRO/DX INJ NEW DRUG ADDON: CPT

## 2024-11-21 PROCEDURE — 83690 ASSAY OF LIPASE: CPT | Performed by: EMERGENCY MEDICINE

## 2024-11-21 PROCEDURE — 84145 PROCALCITONIN (PCT): CPT | Performed by: EMERGENCY MEDICINE

## 2024-11-21 PROCEDURE — 36415 COLL VENOUS BLD VENIPUNCTURE: CPT | Performed by: EMERGENCY MEDICINE

## 2024-11-21 PROCEDURE — 80053 COMPREHEN METABOLIC PANEL: CPT | Performed by: EMERGENCY MEDICINE

## 2024-11-21 PROCEDURE — 96361 HYDRATE IV INFUSION ADD-ON: CPT

## 2024-11-21 PROCEDURE — 99284 EMERGENCY DEPT VISIT MOD MDM: CPT

## 2024-11-21 PROCEDURE — 85027 COMPLETE CBC AUTOMATED: CPT | Performed by: EMERGENCY MEDICINE

## 2024-11-21 PROCEDURE — 93005 ELECTROCARDIOGRAM TRACING: CPT

## 2024-11-21 PROCEDURE — 85610 PROTHROMBIN TIME: CPT | Performed by: EMERGENCY MEDICINE

## 2024-11-21 PROCEDURE — 85007 BL SMEAR W/DIFF WBC COUNT: CPT | Performed by: EMERGENCY MEDICINE

## 2024-11-21 RX ORDER — MORPHINE SULFATE 4 MG/ML
4 INJECTION, SOLUTION INTRAMUSCULAR; INTRAVENOUS ONCE
Status: COMPLETED | OUTPATIENT
Start: 2024-11-21 | End: 2024-11-21

## 2024-11-21 RX ORDER — ONDANSETRON 2 MG/ML
4 INJECTION INTRAMUSCULAR; INTRAVENOUS ONCE
Status: COMPLETED | OUTPATIENT
Start: 2024-11-21 | End: 2024-11-21

## 2024-11-21 RX ORDER — KETOROLAC TROMETHAMINE 30 MG/ML
15 INJECTION, SOLUTION INTRAMUSCULAR; INTRAVENOUS ONCE
Status: COMPLETED | OUTPATIENT
Start: 2024-11-21 | End: 2024-11-21

## 2024-11-21 RX ADMIN — ONDANSETRON 4 MG: 2 INJECTION INTRAMUSCULAR; INTRAVENOUS at 22:31

## 2024-11-21 RX ADMIN — KETOROLAC TROMETHAMINE 15 MG: 30 INJECTION, SOLUTION INTRAMUSCULAR at 22:31

## 2024-11-21 RX ADMIN — MORPHINE SULFATE 4 MG: 4 INJECTION INTRAVENOUS at 22:28

## 2024-11-21 RX ADMIN — IOHEXOL 100 ML: 350 INJECTION, SOLUTION INTRAVENOUS at 23:44

## 2024-11-21 RX ADMIN — SODIUM CHLORIDE 1000 ML: 0.9 INJECTION, SOLUTION INTRAVENOUS at 22:28

## 2024-11-22 ENCOUNTER — TELEPHONE (OUTPATIENT)
Dept: OTHER | Facility: HOSPITAL | Age: 74
End: 2024-11-22

## 2024-11-22 PROBLEM — N13.30 HYDROURETERONEPHROSIS: Status: ACTIVE | Noted: 2024-11-22

## 2024-11-22 PROBLEM — R33.9 URINARY RETENTION: Status: ACTIVE | Noted: 2024-11-22

## 2024-11-22 PROBLEM — N17.9 AKI (ACUTE KIDNEY INJURY) (HCC): Status: ACTIVE | Noted: 2024-11-22

## 2024-11-22 PROBLEM — R10.30 LOWER ABDOMINAL PAIN: Status: ACTIVE | Noted: 2024-11-22

## 2024-11-22 PROBLEM — R65.10 SIRS (SYSTEMIC INFLAMMATORY RESPONSE SYNDROME) (HCC): Status: ACTIVE | Noted: 2024-11-22

## 2024-11-22 PROBLEM — E87.1 HYPONATREMIA: Status: ACTIVE | Noted: 2024-11-22

## 2024-11-22 PROBLEM — D72.829 LEUKOCYTOSIS: Status: ACTIVE | Noted: 2024-11-22

## 2024-11-22 LAB
ALBUMIN SERPL BCG-MCNC: 2.7 G/DL (ref 3.5–5)
ALP SERPL-CCNC: 158 U/L (ref 34–104)
ALT SERPL W P-5'-P-CCNC: 102 U/L (ref 7–52)
ANION GAP SERPL CALCULATED.3IONS-SCNC: 7 MMOL/L (ref 4–13)
AST SERPL W P-5'-P-CCNC: 97 U/L (ref 13–39)
ATRIAL RATE: 87 BPM
BACTERIA UR QL AUTO: ABNORMAL /HPF
BASOPHILS # BLD AUTO: 0.04 THOUSANDS/ÂΜL (ref 0–0.1)
BASOPHILS NFR BLD AUTO: 0 % (ref 0–1)
BILIRUB SERPL-MCNC: 1.06 MG/DL (ref 0.2–1)
BILIRUB UR QL STRIP: NEGATIVE
BUN SERPL-MCNC: 17 MG/DL (ref 5–25)
CALCIUM ALBUM COR SERPL-MCNC: 8.6 MG/DL (ref 8.3–10.1)
CALCIUM SERPL-MCNC: 7.6 MG/DL (ref 8.4–10.2)
CHLORIDE SERPL-SCNC: 101 MMOL/L (ref 96–108)
CLARITY UR: CLEAR
CO2 SERPL-SCNC: 22 MMOL/L (ref 21–32)
COLOR UR: ABNORMAL
CREAT SERPL-MCNC: 1.49 MG/DL (ref 0.6–1.3)
EOSINOPHIL # BLD AUTO: 0.06 THOUSAND/ÂΜL (ref 0–0.61)
EOSINOPHIL NFR BLD AUTO: 0 % (ref 0–6)
ERYTHROCYTE [DISTWIDTH] IN BLOOD BY AUTOMATED COUNT: 14.6 % (ref 11.6–15.1)
GFR SERPL CREATININE-BSD FRML MDRD: 45 ML/MIN/1.73SQ M
GLUCOSE SERPL-MCNC: 108 MG/DL (ref 65–140)
GLUCOSE UR STRIP-MCNC: NEGATIVE MG/DL
HCT VFR BLD AUTO: 38.6 % (ref 36.5–49.3)
HGB BLD-MCNC: 12.5 G/DL (ref 12–17)
HGB UR QL STRIP.AUTO: ABNORMAL
IMM GRANULOCYTES # BLD AUTO: 0.41 THOUSAND/UL (ref 0–0.2)
IMM GRANULOCYTES NFR BLD AUTO: 2 % (ref 0–2)
KETONES UR STRIP-MCNC: NEGATIVE MG/DL
LEUKOCYTE ESTERASE UR QL STRIP: NEGATIVE
LYMPHOCYTES # BLD AUTO: 1.84 THOUSANDS/ÂΜL (ref 0.6–4.47)
LYMPHOCYTES NFR BLD AUTO: 8 % (ref 14–44)
MAGNESIUM SERPL-MCNC: 1.9 MG/DL (ref 1.9–2.7)
MCH RBC QN AUTO: 27.8 PG (ref 26.8–34.3)
MCHC RBC AUTO-ENTMCNC: 32.4 G/DL (ref 31.4–37.4)
MCV RBC AUTO: 86 FL (ref 82–98)
MONOCYTES # BLD AUTO: 1.65 THOUSAND/ÂΜL (ref 0.17–1.22)
MONOCYTES NFR BLD AUTO: 7 % (ref 4–12)
NEUTROPHILS # BLD AUTO: 19.61 THOUSANDS/ÂΜL (ref 1.85–7.62)
NEUTS SEG NFR BLD AUTO: 83 % (ref 43–75)
NITRITE UR QL STRIP: NEGATIVE
NON-SQ EPI CELLS URNS QL MICRO: ABNORMAL /HPF
NRBC BLD AUTO-RTO: 0 /100 WBCS
P AXIS: 41 DEGREES
PH UR STRIP.AUTO: 6 [PH]
PLATELET # BLD AUTO: 531 THOUSANDS/UL (ref 149–390)
PMV BLD AUTO: 9.3 FL (ref 8.9–12.7)
POTASSIUM SERPL-SCNC: 4 MMOL/L (ref 3.5–5.3)
PR INTERVAL: 148 MS
PROT SERPL-MCNC: 5.8 G/DL (ref 6.4–8.4)
PROT UR STRIP-MCNC: NEGATIVE MG/DL
QRS AXIS: 56 DEGREES
QRSD INTERVAL: 70 MS
QT INTERVAL: 362 MS
QTC INTERVAL: 435 MS
RBC # BLD AUTO: 4.49 MILLION/UL (ref 3.88–5.62)
RBC #/AREA URNS AUTO: ABNORMAL /HPF
SODIUM SERPL-SCNC: 130 MMOL/L (ref 135–147)
SP GR UR STRIP.AUTO: 1.01 (ref 1–1.03)
T WAVE AXIS: 11 DEGREES
TSH SERPL DL<=0.05 MIU/L-ACNC: 2.01 UIU/ML (ref 0.45–4.5)
UROBILINOGEN UR STRIP-ACNC: <2 MG/DL
VENTRICULAR RATE: 87 BPM
WBC # BLD AUTO: 23.61 THOUSAND/UL (ref 4.31–10.16)
WBC #/AREA URNS AUTO: ABNORMAL /HPF

## 2024-11-22 PROCEDURE — 84443 ASSAY THYROID STIM HORMONE: CPT

## 2024-11-22 PROCEDURE — 99221 1ST HOSP IP/OBS SF/LOW 40: CPT | Performed by: INTERNAL MEDICINE

## 2024-11-22 PROCEDURE — 81001 URINALYSIS AUTO W/SCOPE: CPT | Performed by: EMERGENCY MEDICINE

## 2024-11-22 PROCEDURE — 83735 ASSAY OF MAGNESIUM: CPT

## 2024-11-22 PROCEDURE — 80053 COMPREHEN METABOLIC PANEL: CPT

## 2024-11-22 PROCEDURE — 85025 COMPLETE CBC W/AUTO DIFF WBC: CPT

## 2024-11-22 PROCEDURE — 96365 THER/PROPH/DIAG IV INF INIT: CPT

## 2024-11-22 PROCEDURE — 99223 1ST HOSP IP/OBS HIGH 75: CPT | Performed by: UROLOGY

## 2024-11-22 PROCEDURE — 93010 ELECTROCARDIOGRAM REPORT: CPT | Performed by: STUDENT IN AN ORGANIZED HEALTH CARE EDUCATION/TRAINING PROGRAM

## 2024-11-22 RX ORDER — PANTOPRAZOLE SODIUM 40 MG/1
40 TABLET, DELAYED RELEASE ORAL
Status: DISCONTINUED | OUTPATIENT
Start: 2024-11-22 | End: 2024-11-24 | Stop reason: HOSPADM

## 2024-11-22 RX ORDER — SODIUM CHLORIDE, SODIUM GLUCONATE, SODIUM ACETATE, POTASSIUM CHLORIDE, MAGNESIUM CHLORIDE, SODIUM PHOSPHATE, DIBASIC, AND POTASSIUM PHOSPHATE .53; .5; .37; .037; .03; .012; .00082 G/100ML; G/100ML; G/100ML; G/100ML; G/100ML; G/100ML; G/100ML
75 INJECTION, SOLUTION INTRAVENOUS CONTINUOUS
Status: DISCONTINUED | OUTPATIENT
Start: 2024-11-22 | End: 2024-11-22

## 2024-11-22 RX ORDER — LIDOCAINE HYDROCHLORIDE 20 MG/ML
1 JELLY TOPICAL ONCE
Status: DISCONTINUED | OUTPATIENT
Start: 2024-11-22 | End: 2024-11-24 | Stop reason: HOSPADM

## 2024-11-22 RX ORDER — POLYETHYLENE GLYCOL 3350 17 G/17G
17 POWDER, FOR SOLUTION ORAL DAILY PRN
Status: DISCONTINUED | OUTPATIENT
Start: 2024-11-22 | End: 2024-11-24 | Stop reason: HOSPADM

## 2024-11-22 RX ORDER — FINASTERIDE 5 MG/1
5 TABLET, FILM COATED ORAL DAILY
Status: DISCONTINUED | OUTPATIENT
Start: 2024-11-22 | End: 2024-11-24 | Stop reason: HOSPADM

## 2024-11-22 RX ORDER — PRAVASTATIN SODIUM 80 MG/1
80 TABLET ORAL
Status: DISCONTINUED | OUTPATIENT
Start: 2024-11-22 | End: 2024-11-22

## 2024-11-22 RX ORDER — SODIUM CHLORIDE 9 MG/ML
75 INJECTION, SOLUTION INTRAVENOUS CONTINUOUS
Status: DISCONTINUED | OUTPATIENT
Start: 2024-11-22 | End: 2024-11-24 | Stop reason: HOSPADM

## 2024-11-22 RX ORDER — TAMSULOSIN HYDROCHLORIDE 0.4 MG/1
0.4 CAPSULE ORAL
Status: DISCONTINUED | OUTPATIENT
Start: 2024-11-22 | End: 2024-11-24 | Stop reason: HOSPADM

## 2024-11-22 RX ORDER — HEPARIN SODIUM 5000 [USP'U]/ML
5000 INJECTION, SOLUTION INTRAVENOUS; SUBCUTANEOUS EVERY 8 HOURS SCHEDULED
Status: DISCONTINUED | OUTPATIENT
Start: 2024-11-22 | End: 2024-11-24 | Stop reason: HOSPADM

## 2024-11-22 RX ORDER — FLUOXETINE 20 MG/5ML
20 SOLUTION ORAL DAILY
Status: DISCONTINUED | OUTPATIENT
Start: 2024-11-22 | End: 2024-11-22

## 2024-11-22 RX ORDER — ACETAMINOPHEN 325 MG/1
650 TABLET ORAL EVERY 6 HOURS PRN
Status: DISCONTINUED | OUTPATIENT
Start: 2024-11-22 | End: 2024-11-24 | Stop reason: HOSPADM

## 2024-11-22 RX ADMIN — HEPARIN SODIUM 5000 UNITS: 5000 INJECTION, SOLUTION INTRAVENOUS; SUBCUTANEOUS at 05:29

## 2024-11-22 RX ADMIN — SODIUM CHLORIDE 500 ML: 0.9 INJECTION, SOLUTION INTRAVENOUS at 04:26

## 2024-11-22 RX ADMIN — FLUOXETINE HYDROCHLORIDE 20 MG: 20 CAPSULE ORAL at 11:51

## 2024-11-22 RX ADMIN — SODIUM CHLORIDE 75 ML/HR: 0.9 INJECTION, SOLUTION INTRAVENOUS at 05:29

## 2024-11-22 RX ADMIN — FINASTERIDE 5 MG: 5 TABLET, FILM COATED ORAL at 13:14

## 2024-11-22 RX ADMIN — HEPARIN SODIUM 5000 UNITS: 5000 INJECTION, SOLUTION INTRAVENOUS; SUBCUTANEOUS at 22:22

## 2024-11-22 RX ADMIN — CEFTRIAXONE SODIUM 1000 MG: 10 INJECTION, POWDER, FOR SOLUTION INTRAVENOUS at 01:35

## 2024-11-22 RX ADMIN — HEPARIN SODIUM 5000 UNITS: 5000 INJECTION, SOLUTION INTRAVENOUS; SUBCUTANEOUS at 13:13

## 2024-11-22 RX ADMIN — TAMSULOSIN HYDROCHLORIDE 0.4 MG: 0.4 CAPSULE ORAL at 15:49

## 2024-11-22 RX ADMIN — CEFTRIAXONE SODIUM 1000 MG: 10 INJECTION, POWDER, FOR SOLUTION INTRAVENOUS at 22:22

## 2024-11-22 RX ADMIN — PANTOPRAZOLE SODIUM 40 MG: 40 TABLET, DELAYED RELEASE ORAL at 05:30

## 2024-11-22 NOTE — ASSESSMENT & PLAN NOTE
In setting of bladder distention, bilateral hydronephrosis, reflux  Kidney function trending down  Creatinine 1.4, admission 1.6.  Baseline is 1.1  Monitor for postobstructive diuresis  24 urine output 7300ml

## 2024-11-22 NOTE — CASE MANAGEMENT
Case Management Discharge Planning Note    Patient name Melchor Adrian  Location /-01 MRN [id number]  : 1950 Date 2024       Current Admission Date: 2024  Current Admission Diagnosis:Hydroureteronephrosis   Patient Active Problem List    Diagnosis Date Noted Date Diagnosed    Hydroureteronephrosis 2024     SIRS (systemic inflammatory response syndrome) ([facility]) 2024     ERIKA (acute kidney injury) ([facility]) 2024     Hyponatremia 2024     Lower abdominal pain 2024     Leukocytosis 2024     Urinary retention 2024     Positive blood culture 2024     Hypertension 11/15/2024     Gastroesophageal reflux disease 11/15/2024     Hyperlipidemia 11/15/2024     Posttraumatic stress disorder 11/15/2024     Mixed hyperlipidemia 11/15/2024     Post-traumatic stress disorder, unspecified 11/15/2024     Hypothyroid 11/15/2024     Sebaceous cyst 2020     Soft tissue mass 2020       LOS (days): 0  Geometric Mean LOS (GMLOS) (days):   Days to GMLOS:     OBJECTIVE:            Current admission status: Observation   Preferred Pharmacy:   [id number] [place] [facility] [address] [address]  Phone: [phone] Fax: [phone]    [id number] [place] - [address]  [address]  [place] [address]  Phone: [phone] Fax: [phone]    [facility]  [address]  [address]  [address] [address]  Phone: [phone] Fax: [phone]    Primary Care Provider: No primary care provider on file.    Primary Insurance: [employer] [facility]  Secondary Insurance:     DISCHARGE DETAILS:    Discharge planning discussed with:: Patient  Freedom of Choice: Yes  Comments - Freedom of Choice: CM discussed freedom of choice as it pertains to discharge planning. Patient is now agreeable to [facility] for g[facility] management. Reviewed  choice list and decided on [facility]. CM called the va [phone] and left a message with the  requesting patient be given auth for [facility], cm was called back by va and auth was given to [facility] to start  CM contacted family/caregiver?: No- see comments  Were Treatment Team discharge recommendations reviewed with patient/caregiver?: Yes  Did patient/caregiver verbalize understanding of patient care needs?: Yes  Were patient/caregiver advised of the risks associated with not following Treatment Team discharge recommendations?: Yes         Requested Home Health Care         Is the patient interested in [facility] at discharge?: Yes  Home Health Discipline requested:: Nursing  Home Health Agency Name:: [facility]  Home Health Follow-Up Provider:: PCP  Home Health Services Needed:: Urinary Incontinence Catheter Management, Evaluate Functional Status and Safety  Homebound Criteria Met:: Uses an Assist Device (i.e. cane, walker, etc)  Supporting Clincal Findings:: Fatigues Easliy in Short Distances, Limited Endurance

## 2024-11-22 NOTE — ASSESSMENT & PLAN NOTE
To be reactive  The patient does not meet SIRS or sepsis criteria as of now  Urinalysis reveals WBC 2-4, negative for bacteria leukocytosis or nitrates  Trend WBC with daily CBC

## 2024-11-22 NOTE — PLAN OF CARE
Problem: PAIN - ADULT  Goal: Verbalizes/displays adequate comfort level or baseline comfort level  Description: Interventions:  - Encourage patient to monitor pain and request assistance  - Assess pain using appropriate pain scale  - Administer analgesics based on type and severity of pain and evaluate response  - Implement non-pharmacological measures as appropriate and evaluate response  - Consider cultural and social influences on pain and pain management  - Notify physician/advanced practitioner if interventions unsuccessful or patient reports new pain  Outcome: Progressing     Problem: INFECTION - ADULT  Goal: Absence or prevention of progression during hospitalization  Description: INTERVENTIONS:  - Assess and monitor for signs and symptoms of infection  - Monitor lab/diagnostic results  - Monitor all insertion sites, i.e. indwelling lines, tubes, and drains  - Monitor endotracheal if appropriate and nasal secretions for changes in amount and color  - McCoy appropriate cooling/warming therapies per order  - Administer medications as ordered  - Instruct and encourage patient and family to use good hand hygiene technique  - Identify and instruct in appropriate isolation precautions for identified infection/condition  Outcome: Progressing  Goal: Absence of fever/infection during neutropenic period  Description: INTERVENTIONS:  - Monitor WBC    Outcome: Progressing     Problem: SAFETY ADULT  Goal: Patient will remain free of falls  Description: INTERVENTIONS:  - Educate patient/family on patient safety including physical limitations  - Instruct patient to call for assistance with activity   - Consult OT/PT to assist with strengthening/mobility   - Keep Call bell within reach  - Keep bed low and locked with side rails adjusted as appropriate  - Keep care items and personal belongings within reach  - Initiate and maintain comfort rounds  - Make Fall Risk Sign visible to staff  - Apply yellow socks and bracelet  for high fall risk patients  - Consider moving patient to room near nurses station  Outcome: Progressing  Goal: Maintain or return to baseline ADL function  Description: INTERVENTIONS:  -  Assess patient's ability to carry out ADLs; assess patient's baseline for ADL function and identify physical deficits which impact ability to perform ADLs (bathing, care of mouth/teeth, toileting, grooming, dressing, etc.)  - Assess/evaluate cause of self-care deficits   - Assess range of motion  - Assess patient's mobility; develop plan if impaired  - Assess patient's need for assistive devices and provide as appropriate  - Encourage maximum independence but intervene and supervise when necessary  - Involve family in performance of ADLs  - Assess for home care needs following discharge   - Consider OT consult to assist with ADL evaluation and planning for discharge  - Provide patient education as appropriate  Outcome: Progressing  Goal: Maintains/Returns to pre admission functional level  Description: INTERVENTIONS:  - Perform AM-PAC 6 Click Basic Mobility/ Daily Activity assessment daily.  - Set and communicate daily mobility goal to care team and patient/family/caregiver.   - Collaborate with rehabilitation services on mobility goals if consulted  - Out of bed for toileting  - Record patient progress and toleration of activity level   Outcome: Progressing     Problem: DISCHARGE PLANNING  Goal: Discharge to home or other facility with appropriate resources  Description: INTERVENTIONS:  - Identify barriers to discharge w/patient and caregiver  - Arrange for needed discharge resources and transportation as appropriate  - Identify discharge learning needs (meds, wound care, etc.)  - Arrange for interpretive services to assist at discharge as needed  - Refer to Case Management Department for coordinating discharge planning if the patient needs post-hospital services based on physician/advanced practitioner order or complex needs  related to functional status, cognitive ability, or social support system  Outcome: Progressing     Problem: Knowledge Deficit  Goal: Patient/family/caregiver demonstrates understanding of disease process, treatment plan, medications, and discharge instructions  Description: Complete learning assessment and assess knowledge base.  Interventions:  - Provide teaching at level of understanding  - Provide teaching via preferred learning methods  Outcome: Progressing

## 2024-11-22 NOTE — ED NOTES
Pt O2 reading was between 88-90 at rest. This writer put pt on 1 L O@ and pt is now at 94%      Suzan Barney RN  11/21/24 1020

## 2024-11-22 NOTE — ASSESSMENT & PLAN NOTE
Meets SIRS criteria on admission plan under urinary retention  UA obtained after abx and patient had been on augmentin outpatient  Continue abx

## 2024-11-22 NOTE — ASSESSMENT & PLAN NOTE
Leading of distended bladder, reflux  Can repeat ultrasound in 48 hours versus outpatient depending on disposition

## 2024-11-22 NOTE — TELEPHONE ENCOUNTER
Melchor is a 74-year-old male seen in urologic consultation due to urinary retention, ERIKA, bilateral hydronephrosis    Arauz catheter placed for high-volume return.     Repeat ultrasound ordered to reassess hydronephrosis 11/23      Plan to keep catheter in place for 7 to 10 days, please assist in scheduling trial of void outpatient.

## 2024-11-22 NOTE — H&P
H&P - Hospitalist   Name: Melchor Adrian 74 y.o. male I MRN: [id number]  Unit/Bed#: -01 I Date of Admission: 11/21/2024   Date of Service: 11/22/2024 I Hospital Day: 0     Assessment & Plan  Lower abdominal pain  Since in the emergency department with lower abdominal pain and abdominal distention, status post cholecystectomy for perforated gallbladder about a week ago  The patient states that initially he thought it was constipation but then he developed nausea and abdominal distention per his report  CT abdomen and pelvis with bilateral hydroureteronephrosis with bladder distention suggesting bladder outlet obstruction, Arauz catheter was placed in the ER.  Assessment the patient is stated after Arauz placement he feels so much better and believes that distention has improved.  Abdomen is soft nontender  Work shows leukocytosis, ERIKA, hyponatremia, transaminitis and elevated total bilirubin  Continue with Arauz catheter and pain management   urology consult  ERIKA (acute kidney injury) (HCC)  Likely to be postrenal caused by bladder obstruction obstruction  Normal saline x 2 bolus given in the ER  Monitor kidney function with daily BMP    Hyponatremia  Multifactorial  Reports decreasing oral intake due to symptoms but also could be because/related to ERIKA  Normal saline x 2 bolus given   Monitor electrolytes with daily BMP  Leukocytosis  To be reactive  The patient does not meet SIRS or sepsis criteria as of now  Urinalysis reveals WBC 2-4, negative for bacteria leukocytosis or nitrates  Trend WBC with daily CBC  Gastroesophageal reflux disease  Famotidine at home, continue with Protonix      VTE Pharmacologic Prophylaxis: VTE Score: 5 High Risk (Score >/= 5) - Pharmacological DVT Prophylaxis Ordered: heparin. Sequential Compression Devices Ordered.  Code Status: Level 1 - Full Code   Discussion with family: Patient declined call to .     Anticipated Length of Stay: Patient will be admitted on an  observation basis with an anticipated length of stay of less than 2 midnights secondary to hydroureteronephrosis, bladder obstruction,, ERIKA and pending urology consult.    History of Present Illness   Chief Complaint: Lower abdominal pain    Melchor Adrian is a 74 y.o. male with a PMH of hypertension, hyperlipidemia, PTSD, hypothyroid, underwent a cholecystectomy a week ago who presents with lower abdominal pain.  He reports he developed this lower abdominal pain 48 hours ago and he felt like he was unable to void and then all of a sudden he had an episode of incontinence and then the inability to void return.  CT is scan abdomen and pelvis shows obstruction and bladder, which she was relief with Arauz catheter.  The patient has been admitted under observation for further management and treatment.    Review of Systems   Constitutional:  Negative for chills and fever.   HENT:  Negative for ear pain and sore throat.    Eyes:  Negative for pain and visual disturbance.   Respiratory:  Negative for cough and shortness of breath.    Cardiovascular:  Negative for chest pain and palpitations.   Gastrointestinal:  Positive for abdominal distention and abdominal pain. Negative for vomiting.   Genitourinary:  Positive for decreased urine volume, difficulty urinating, flank pain, frequency and urgency. Negative for dysuria and hematuria.   Musculoskeletal:  Negative for arthralgias and back pain.   Skin:  Negative for color change and rash.   Neurological:  Negative for seizures and syncope.   All other systems reviewed and are negative.      Historical Information   Past Medical History:   Diagnosis Date    Basal cell carcinoma     Left Upper back    GERD (gastroesophageal reflux disease)     Hyperlipidemia     Hypertension     PTSD (post-traumatic stress disorder)      Past Surgical History:   Procedure Laterality Date    CHOLECYSTECTOMY LAPAROSCOPIC N/A 11/14/2024    Procedure: CHOLECYSTECTOMY LAPAROSCOPIC;  Surgeon: Law  Yung Lew DO;  Location: [facility];  Service: General    COLONOSCOPY      KNEE SURGERY      MASS EXCISION N/A 5/27/2020    Procedure: EXCISION BIOPSY TISSUE LESION/MASS RIGHT BUTTOCK;  Surgeon: Tez Erickson MD;  Location: [facility];  Service: General     Social History     Tobacco Use    Smoking status: Former    Smokeless tobacco: Never   Vaping Use    Vaping status: Never Used   Substance and Sexual Activity    Alcohol use: Not Currently    Drug use: Never    Sexual activity: Not on file     E-Cigarette/Vaping    E-Cigarette Use Never User      E-Cigarette/Vaping Substances    Nicotine No     THC No     CBD No     Flavoring No     Other No     Unknown No      Family History   Problem Relation Age of Onset    Heart disease Mother     Heart disease Father      Social History:  Marital Status: /Civil Union   Occupation: Retired  Patient Pre-hospital Living Situation: Home  Patient Pre-hospital Level of Mobility: walks  Patient Pre-hospital Diet Restrictions: None    Meds/Allergies   I have reviewed home medications with patient personally.  Prior to Admission medications    Medication Sig Start Date End Date Taking? Authorizing Provider   amLODIPine-benazepril (LOTREL 2.5-10) 2.5-10 MG per capsule Take 1 capsule by mouth daily   Yes Historical Provider, MD   amoxicillin-clavulanate (AUGMENTIN) 875-125 mg per tablet Take 1 tablet by mouth every 12 (twelve) hours for 5 days 11/17/24 11/22/24 Yes Gentry Arnold PA-C   FLUoxetine (PROzac) 20 mg/5 mL solution Take by mouth daily   Yes Historical Provider, MD   metroNIDAZOLE (METROGEL) 0.75 % gel Apply topically 2 (two) times a day 8/25/23  Yes Izzy Woods MD   omeprazole (PriLOSEC) 40 MG capsule Take 40 mg by mouth daily   Yes Historical Provider, MD   rosuvastatin (CRESTOR) 10 MG tablet Take 5 mg by mouth daily   Yes Historical Provider, MD   simethicone (MYLICON) 80 mg chewable tablet Chew 80 mg every 6 (six) hours as needed for flatulence   Yes  Historical Provider, MD   clindamycin (CLEOCIN T) 1 % external solution Apply topically 2 (two) times a day To face. DEB Hinojosa 1950, # [id number]  Patient not taking: Reported on 2024 9/15/21   Ivette Carias MD   HYDROcodone-acetaminophen (Norco) 5-325 mg per tablet Take 1 tablet by mouth every 6 (six) hours as needed for pain for up to 10 days Max Daily Amount: 4 tablets  Patient not taking: Reported on 2024  Gentry Arnold PA-C   triamcinolone (KENALOG) 0.1 % cream Apply to affected spots twice a day for 2 weeks on the 1 week off.  Patient not taking: Reported on 2024   Izzy Woods MD     Allergies   Allergen Reactions    Atorvastatin Other (See Comments)    Simvastatin Other (See Comments)       Objective :  Temp:  [97.5 °F (36.4 °C)-97.6 °F (36.4 °C)] 97.6 °F (36.4 °C)  HR:  [65-88] 67  BP: (102-143)/(58-69) 108/69  Resp:  [15-20] 16  SpO2:  [94 %-96 %] 96 %  O2 Device: None (Room air)    Physical Exam  Vitals and nursing note reviewed.   Constitutional:       General: He is not in acute distress.     Appearance: Normal appearance. He is well-developed. He is obese.   HENT:      Head: Normocephalic and atraumatic.   Eyes:      Conjunctiva/sclera: Conjunctivae normal.   Cardiovascular:      Rate and Rhythm: Normal rate and regular rhythm.      Heart sounds: No murmur heard.  Pulmonary:      Effort: Pulmonary effort is normal. No respiratory distress.      Breath sounds: Normal breath sounds.   Abdominal:      General: Bowel sounds are normal.      Palpations: Abdomen is soft.      Tenderness: There is no abdominal tenderness.   Genitourinary:     Penis: Normal.       Testes: Normal.      Comments: Arauz catheter present  Musculoskeletal:         General: No swelling.      Cervical back: Neck supple.   Skin:     General: Skin is warm and dry.      Capillary Refill: Capillary refill takes less than 2 seconds.   Neurological:      General: No focal deficit  "present.      Mental Status: He is alert and oriented to person, place, and time. Mental status is at baseline.   Psychiatric:         Mood and Affect: Mood normal.          Lines/Drains:  Lines/Drains/Airways       Active Status       Name Placement date Placement time Site Days    Urethral Catheter 18 Fr. 11/22/24  0058  --  less than 1                  Urinary Catheter:  Goal for removal:  Removed after urology consult               Lab Results: I have reviewed the following results:  Results from last 7 days   Lab Units 11/21/24  2226 11/16/24  0450 11/15/24  0438   WBC Thousand/uL 29.27*   < > 18.77*   HEMOGLOBIN g/dL 14.3   < > 12.7   HEMATOCRIT % 43.2   < > 37.9   PLATELETS Thousands/uL 702*   < > 363   SEGS PCT %  --   --  89*   LYMPHO PCT % 3*  --  6*   MONO PCT % 4  --  3*   EOS PCT % 0  --  0    < > = values in this interval not displayed.     Results from last 7 days   Lab Units 11/21/24  2226   SODIUM mmol/L 128*   POTASSIUM mmol/L 4.0   CHLORIDE mmol/L 97   CO2 mmol/L 19*   BUN mg/dL 17   CREATININE mg/dL 1.67*   ANION GAP mmol/L 12   CALCIUM mg/dL 8.3*   ALBUMIN g/dL 3.2*   TOTAL BILIRUBIN mg/dL 1.36*   ALK PHOS U/L 102   ALT U/L 81*   AST U/L 43*   GLUCOSE RANDOM mg/dL 147*     Results from last 7 days   Lab Units 11/21/24  2254   INR  1.08         No results found for: \"HGBA1C\"  Results from last 7 days   Lab Units 11/21/24  2254   LACTIC ACID mmol/L 1.2   PROCALCITONIN ng/ml 0.15       Imaging Results Review: I personally reviewed the following image studies/reports in PACS and discussed pertinent findings with Radiology: CT abdomen/pelvis. My interpretation of the radiology images/reports is: Lateral obstruction.  Other Study Results Review: EKG was reviewed.     Administrative Statements     Spent 35 minutes in diagnosing, treating and created plan of care for Mr. Adrian.  I have also reviewed his chart and home medications.  ** Please Note: This note has been constructed using a voice " recognition system. **

## 2024-11-22 NOTE — PLAN OF CARE
Problem: PAIN - ADULT  Goal: Verbalizes/displays adequate comfort level or baseline comfort level  Description: Interventions:  - Encourage patient to monitor pain and request assistance  - Assess pain using appropriate pain scale  - Administer analgesics based on type and severity of pain and evaluate response  - Implement non-pharmacological measures as appropriate and evaluate response  - Consider cultural and social influences on pain and pain management  - Notify physician/advanced practitioner if interventions unsuccessful or patient reports new pain  Outcome: Progressing     Problem: SAFETY ADULT  Goal: Patient will remain free of falls  Description: INTERVENTIONS:  - Educate patient/family on patient safety including physical limitations  - Instruct patient to call for assistance with activity   - Consult OT/PT to assist with strengthening/mobility   - Keep Call bell within reach  - Keep bed low and locked with side rails adjusted as appropriate  - Keep care items and personal belongings within reach  - Initiate and maintain comfort rounds  - Make Fall Risk Sign visible to staff  - Offer Toileting every 2 Hours, in advance of need  - Initiate/Maintain bed alarm  - Apply yellow socks and bracelet for high fall risk patients  - Consider moving patient to room near nurses station  Outcome: Progressing  Goal: Maintain or return to baseline ADL function  Description: INTERVENTIONS:  -  Assess patient's ability to carry out ADLs; assess patient's baseline for ADL function and identify physical deficits which impact ability to perform ADLs (bathing, care of mouth/teeth, toileting, grooming, dressing, etc.)  - Assess/evaluate cause of self-care deficits   - Assess range of motion  - Assess patient's mobility; develop plan if impaired  - Assess patient's need for assistive devices and provide as appropriate  - Encourage maximum independence but intervene and supervise when necessary  - Involve family in performance  of ADLs  - Assess for home care needs following discharge   - Consider OT consult to assist with ADL evaluation and planning for discharge  - Provide patient education as appropriate  Outcome: Progressing

## 2024-11-22 NOTE — ASSESSMENT & PLAN NOTE
Likely to be postrenal caused by bladder obstruction obstruction  Normal saline x 2 bolus given in the ER  Monitor kidney function with daily BMP

## 2024-11-22 NOTE — ED PROVIDER NOTES
Time reflects when diagnosis was documented in both MDM as applicable and the Disposition within this note       Time User Action Codes Description Comment    11/21/2024 10:22 PM Erne, Evert Add [R10.30] Lower abdominal pain     11/21/2024 10:22 PM Erne, Evert Add [R11.0] Nausea     11/21/2024 10:22 PM Erne, Evert Add [K59.00] Constipation     11/22/2024 12:46 AM Erne, Evert Add [N17.9] ERIKA (acute kidney injury) (HCC)     11/22/2024 12:46 AM Erne, Evert Add [R33.8] Acute urinary retention     11/22/2024 12:47 AM Erne, Evert Modify [R10.30] Lower abdominal pain     11/22/2024 12:47 AM Erne, Evert Modify [R33.8] Acute urinary retention     11/22/2024  1:54 AM Erne, Evert Add [D72.829] Leukocytosis           ED Disposition       ED Disposition   Admit    Condition   Stable    Date/Time   Fri Nov 22, 2024  1:54 AM    Comment   Case was discussed with RADHA and the patient's admission status was agreed to be Admission Status: observation status to the service of Dr. Borrego .               Assessment & Plan       Medical Decision Making  74-year-old male history of hypertension with recent cholecystectomy for perforated gallbladder about 1 week ago presenting with abdominal pain.  Recently postop from perforated gallbladder and cholecystectomy with abdominal pain.  Plan for labs including abdominal labs.  CT imaging.  Symptom management with IV pain and nausea medication plus fluids.  CT imaging.  Reassess.    Labs interpreted by me notable for white count of 30.  Also notable for creatinine of 1.6 up from baseline around 1.  UA without infection.  Imaging notable for bilateral hydro from bladder outlet obstruction.  Arauz catheter placed with removal of over 1 L of urine and significant improvement in symptoms.  No clear source of infection.  Will give 1 dose of antibiotics.  Plan for further evaluation management inpatient.  Admitted.    Amount and/or Complexity of Data Reviewed  Labs: ordered.  Radiology:  ordered.    Risk  Prescription drug management.             Medications   lidocaine (URO-JET) 2 % urethral/mucosal gel 1 Application (1 Application Urethral Not Given 11/22/24 0100)   ceftriaxone (ROCEPHIN) 1 g/50 mL in dextrose IVPB (1,000 mg Intravenous New Bag 11/22/24 0135)   sodium chloride 0.9 % bolus 1,000 mL (0 mL Intravenous Stopped 11/21/24 2334)   morphine injection 4 mg (4 mg Intravenous Given 11/21/24 2228)   ketorolac (TORADOL) injection 15 mg (15 mg Intravenous Given 11/21/24 2231)   ondansetron (ZOFRAN) injection 4 mg (4 mg Intravenous Given 11/21/24 2231)   iohexol (OMNIPAQUE) 350 MG/ML injection (MULTI-DOSE) 100 mL (100 mL Intravenous Given 11/21/24 2344)       ED Risk Strat Scores                           SBIRT 20yo+      Flowsheet Row Most Recent Value   Initial Alcohol Screen: US AUDIT-C     1. How often do you have a drink containing alcohol? 0 Filed at: 11/21/2024 2218   2. How many drinks containing alcohol do you have on a typical day you are drinking?  0 Filed at: 11/21/2024 2218   3a. Male UNDER 65: How often do you have five or more drinks on one occasion? 0 Filed at: 11/21/2024 2218   3b. FEMALE Any Age, or MALE 65+: How often do you have 4 or more drinks on one occassion? 0 Filed at: 11/21/2024 2218   Audit-C Score 0 Filed at: 11/21/2024 2218   MANUELA: How many times in the past year have you...    Used an illegal drug or used a prescription medication for non-medical reasons? Never Filed at: 11/21/2024 2218                            History of Present Illness       Chief Complaint   Patient presents with    Abdominal Pain     Pt c/o abdominal pain. Recently had gallbladder taken out. Also c/o constipation and nausea x 2 days       Past Medical History:   Diagnosis Date    Basal cell carcinoma     Left Upper back    GERD (gastroesophageal reflux disease)     Hyperlipidemia     Hypertension     PTSD (post-traumatic stress disorder)       Past Surgical History:   Procedure Laterality Date     CHOLECYSTECTOMY LAPAROSCOPIC N/A 11/14/2024    Procedure: CHOLECYSTECTOMY LAPAROSCOPIC;  Surgeon: Law Lew DO;  Location: [facility];  Service: General    COLONOSCOPY      KNEE SURGERY      MASS EXCISION N/A 5/27/2020    Procedure: EXCISION BIOPSY TISSUE LESION/MASS RIGHT BUTTOCK;  Surgeon: Tez Erickson MD;  Location: [facility];  Service: General      Family History   Problem Relation Age of Onset    Heart disease Mother     Heart disease Father       Social History     Tobacco Use    Smoking status: Former    Smokeless tobacco: Never   Vaping Use    Vaping status: Never Used   Substance Use Topics    Alcohol use: Not Currently    Drug use: Never      E-Cigarette/Vaping    E-Cigarette Use Never User       E-Cigarette/Vaping Substances    Nicotine No     THC No     CBD No     Flavoring No     Other No     Unknown No       I have reviewed and agree with the history as documented.     74-year-old male history of hypertension with recent cholecystectomy for perforated gallbladder about 1 week ago presenting with abdominal pain.  Patient reports that he was doing well from surgery but began with diffuse lower abdominal pain beginning yesterday.  Reports nausea without vomiting.  Reports constipation for the last 2 days.  Denies chest pain shortness of breath.  Denies any incisional pain.  Denies any fevers or other systemic symptoms.  Denies any other complaints.  Chart reviewed.    Past Medical History:  No date: Basal cell carcinoma      Comment:  Left Upper back  No date: GERD (gastroesophageal reflux disease)  No date: Hyperlipidemia  No date: Hypertension  No date: PTSD (post-traumatic stress disorder)  Family History: non-contributory  Social History          Review of Systems   Constitutional:  Negative for appetite change, chills, diaphoresis, fever and unexpected weight change.   HENT:  Negative for congestion and rhinorrhea.    Eyes:  Negative for photophobia and visual disturbance.    Respiratory:  Negative for cough, chest tightness and shortness of breath.    Cardiovascular:  Negative for chest pain, palpitations and leg swelling.   Gastrointestinal:  Positive for abdominal pain, constipation and nausea. Negative for abdominal distention, blood in stool, diarrhea and vomiting.   Genitourinary:  Negative for dysuria and hematuria.   Musculoskeletal:  Negative for back pain, joint swelling, neck pain and neck stiffness.   Skin:  Negative for color change, pallor, rash and wound.   Neurological:  Negative for dizziness, syncope, weakness, light-headedness and headaches.   Psychiatric/Behavioral:  Negative for agitation.    All other systems reviewed and are negative.          Objective       ED Triage Vitals   Temperature Pulse Blood Pressure Respirations SpO2 Patient Position - Orthostatic VS   11/21/24 2218 11/21/24 2218 11/21/24 2218 11/21/24 2218 11/21/24 2218 --   97.5 °F (36.4 °C) 88 116/67 20 95 %       Temp Source Heart Rate Source BP Location FiO2 (%) Pain Score    11/21/24 2218 11/21/24 2218 -- -- 11/21/24 2228    Oral Monitor   9      Vitals      Date and Time Temp Pulse SpO2 Resp BP Pain Score FACES Pain Rating User   11/22/24 0130 -- 70 95 % 17 105/63 -- -- KG   11/22/24 0100 -- 74 95 % 19 116/65 -- -- KG   11/22/24 0030 -- 66 94 % 17 143/67 -- -- KG   11/22/24 0000 -- 67 95 % 19 117/63 -- -- KG   11/21/24 2228 -- -- -- -- -- 9 --    11/21/24 2218 97.5 °F (36.4 °C) 88 95 % 20 116/67 -- --             Physical Exam  Vitals and nursing note reviewed.   Constitutional:       General: He is not in acute distress.     Appearance: Normal appearance. He is well-developed. He is not ill-appearing, toxic-appearing or diaphoretic.   HENT:      Head: Normocephalic and atraumatic.      Nose: Nose normal. No congestion or rhinorrhea.      Mouth/Throat:      Mouth: Mucous membranes are moist.      Pharynx: Oropharynx is clear. No oropharyngeal exudate or posterior oropharyngeal erythema.    Eyes:      General: No scleral icterus.        Right eye: No discharge.         Left eye: No discharge.      Extraocular Movements: Extraocular movements intact.      Conjunctiva/sclera: Conjunctivae normal.      Pupils: Pupils are equal, round, and reactive to light.   Neck:      Vascular: No JVD.      Trachea: No tracheal deviation.      Comments: Supple. Normal range of motion.   Cardiovascular:      Rate and Rhythm: Normal rate and regular rhythm.      Heart sounds: Normal heart sounds. No murmur heard.     No friction rub. No gallop.      Comments: Normal rate and regular rhythm  Pulmonary:      Effort: Pulmonary effort is normal. No respiratory distress.      Breath sounds: Normal breath sounds. No stridor. No wheezing or rales.      Comments: Clear to auscultation bilaterally  Chest:      Chest wall: No tenderness.   Abdominal:      General: Bowel sounds are normal. There is no distension.      Palpations: Abdomen is soft.      Tenderness: There is abdominal tenderness in the right lower quadrant, suprapubic area and left lower quadrant. There is no right CVA tenderness, left CVA tenderness, guarding or rebound.      Comments: Diffuse lower abdominal tenderness without guarding.  Otherwise soft and nondistended.  Well-appearing incisions without tenderness or drainage.  Normal bowel sounds throughout   Musculoskeletal:         General: No swelling, tenderness, deformity or signs of injury. Normal range of motion.      Cervical back: Normal range of motion and neck supple. No rigidity. No muscular tenderness.      Right lower leg: No edema.      Left lower leg: No edema.   Lymphadenopathy:      Cervical: No cervical adenopathy.   Skin:     General: Skin is warm and dry.      Coloration: Skin is not pale.      Findings: No erythema or rash.   Neurological:      General: No focal deficit present.      Mental Status: He is alert. Mental status is at baseline.      Sensory: No sensory deficit.      Motor: No  weakness or abnormal muscle tone.      Coordination: Coordination normal.      Gait: Gait normal.      Comments: Alert.  Strength and sensation grossly intact.  Ambulatory without difficulty at baseline.    Psychiatric:         Behavior: Behavior normal.         Thought Content: Thought content normal.         Results Reviewed       Procedure Component Value Units Date/Time    Urine Microscopic [[id number]]  (Abnormal) Collected: 11/22/24 0026    Lab Status: Final result Specimen: Urine, Clean Catch Updated: 11/22/24 0041     RBC, UA 20-30 /hpf      WBC, UA 2-4 /hpf      Epithelial Cells None Seen /hpf      Bacteria, UA None Seen /hpf     UA w Reflex to Microscopic w Reflex to Culture [[id number]]  (Abnormal) Collected: 11/22/24 0026    Lab Status: Final result Specimen: Urine, Clean Catch Updated: 11/22/24 0038     Color, UA Light Yellow     Clarity, UA Clear     Specific Gravity, UA 1.009     pH, UA 6.0     Leukocytes, UA Negative     Nitrite, UA Negative     Protein, UA Negative mg/dl      Glucose, UA Negative mg/dl      Ketones, UA Negative mg/dl      Urobilinogen, UA <2.0 mg/dl      Bilirubin, UA Negative     Occult Blood, UA Moderate    Blood culture #2 [[id number]] Collected: 11/21/24 2333    Lab Status: In process Specimen: Blood from Hand, Right Updated: 11/21/24 2341    Procalcitonin [[id number]]  (Normal) Collected: 11/21/24 2254    Lab Status: Final result Specimen: Blood from Arm, Left Updated: 11/21/24 2336     Procalcitonin 0.15 ng/ml     CBC and differential [[id number]]  (Abnormal) Collected: 11/21/24 2226    Lab Status: Final result Specimen: Blood from Arm, Left Updated: 11/21/24 2332     WBC 29.27 Thousand/uL      RBC 5.08 Million/uL      Hemoglobin 14.3 g/dL      Hematocrit 43.2 %      MCV 85 fL      MCH 28.1 pg      MCHC 33.1 g/dL      RDW 14.3 %      MPV 9.3 fL      Platelets 702 Thousands/uL     Narrative:      This is an appended report.  These results have been appended to a previously verified  report.    Manual Differential(PHLEBS Do Not Order) [[id number]]  (Abnormal) Collected: 11/21/24 2226    Lab Status: Final result Specimen: Blood from Arm, Left Updated: 11/21/24 2332     Segmented % 92 %      Lymphocytes % 3 %      Monocytes % 4 %      Eosinophils % 0 %      Basophils % 0 %      Myelocytes % 1 %      Absolute Neutrophils 26.93 Thousand/uL      Absolute Lymphocytes 0.88 Thousand/uL      Absolute Monocytes 1.17 Thousand/uL      Absolute Eosinophils 0.00 Thousand/uL      Absolute Basophils 0.00 Thousand/uL      Absolute Myelocytes 0.29 Thousand/uL      Total Counted --     Platelet Estimate Increased    Lactic acid [[id number]]  (Normal) Collected: 11/21/24 2254    Lab Status: Final result Specimen: Blood from Arm, Left Updated: 11/21/24 2326     LACTIC ACID 1.2 mmol/L     Narrative:      Result may be elevated if tourniquet was used during collection.    Protime-INR [[id number]]  (Normal) Collected: 11/21/24 2254    Lab Status: Final result Specimen: Blood from Arm, Left Updated: 11/21/24 2324     Protime 14.7 seconds      INR 1.08    Narrative:      INR Therapeutic Range    Indication                                             INR Range      Atrial Fibrillation                                               2.0-3.0  Hypercoagulable State                                    2.0.2.3  Left Ventricular Asist Device                            2.0-3.0  Mechanical Heart Valve                                  -    Aortic(with afib, MI, embolism, HF, LA enlargement,    and/or coagulopathy)                                     2.0-3.0 (2.5-3.5)     Mitral                                                             2.5-3.5  Prosthetic/Bioprosthetic Heart Valve               2.0-3.0  Venous thromboembolism (VTE: VT, PE        2.0-3.0    APTT [[id number]]  (Normal) Collected: 11/21/24 2254    Lab Status: Final result Specimen: Blood from Arm, Left Updated: 11/21/24 2324     PTT 28 seconds     Comprehensive metabolic  panel [[id number]]  (Abnormal) Collected: 11/21/24 2226    Lab Status: Final result Specimen: Blood from Arm, Left Updated: 11/21/24 2308     Sodium 128 mmol/L      Potassium 4.0 mmol/L      Chloride 97 mmol/L      CO2 19 mmol/L      ANION GAP 12 mmol/L      BUN 17 mg/dL      Creatinine 1.67 mg/dL      Glucose 147 mg/dL      Calcium 8.3 mg/dL      Corrected Calcium 8.9 mg/dL      AST 43 U/L      ALT 81 U/L      Alkaline Phosphatase 102 U/L      Total Protein 6.6 g/dL      Albumin 3.2 g/dL      Total Bilirubin 1.36 mg/dL      eGFR 39 ml/min/1.73sq m     Narrative:      [employer] guidelines for Chronic Kidney Disease (CKD):     Stage 1 with normal or high GFR (GFR > 90 mL/min/1.73 square meters)    Stage 2 Mild CKD (GFR = 60-89 mL/min/1.73 square meters)    Stage 3A Moderate CKD (GFR = 45-59 mL/min/1.73 square meters)    Stage 3B Moderate CKD (GFR = 30-44 mL/min/1.73 square meters)    Stage 4 Severe CKD (GFR = 15-29 mL/min/1.73 square meters)    Stage 5 End Stage CKD (GFR <15 mL/min/1.73 square meters)  Note: GFR calculation is accurate only with a steady state creatinine    Lipase [[id number]]  (Normal) Collected: 11/21/24 2226    Lab Status: Final result Specimen: Blood from Arm, Left Updated: 11/21/24 2308     Lipase 12 u/L     Blood culture #1 [[id number]] Collected: 11/21/24 2254    Lab Status: In process Specimen: Blood from Arm, Left Updated: 11/21/24 2305            CT abdomen pelvis with contrast   Final Interpretation by Alejandra Street MD (11/22 0035)      Bilateral hydroureteronephrosis with bladder distention suggesting bladder outlet obstruction         Workstation performed: [id number]             Procedures    ED Medication and Procedure Management   Prior to Admission Medications   Prescriptions Last Dose Informant Patient Reported? Taking?   FLUoxetine (PROzac) 20 mg/5 mL solution  Self Yes No   Sig: Take by mouth daily   HYDROcodone-acetaminophen (Norco) 5-325 mg per tablet   No  No   Sig: Take 1 tablet by mouth every 6 (six) hours as needed for pain for up to 10 days Max Daily Amount: 4 tablets   amLODIPine-benazepril (LOTREL 2.5-10) 2.5-10 MG per capsule  Self Yes No   Sig: Take 1 capsule by mouth daily   amoxicillin-clavulanate (AUGMENTIN) 875-125 mg per tablet   No No   Sig: Take 1 tablet by mouth every 12 (twelve) hours for 5 days   clindamycin (CLEOCIN T) 1 % external solution  Self No No   Sig: Apply topically 2 (two) times a day To face. Melchor Adrian,  1950, # [id number]   Patient not taking: Reported on 2024   metroNIDAZOLE (METROGEL) 0.75 % gel  Self No No   Sig: Apply topically 2 (two) times a day   omeprazole (PriLOSEC) 40 MG capsule  Self Yes No   Sig: Take 40 mg by mouth daily   rosuvastatin (CRESTOR) 10 MG tablet  Self Yes No   Sig: Take 5 mg by mouth daily   simethicone (MYLICON) 80 mg chewable tablet  Self Yes No   Sig: Chew 80 mg every 6 (six) hours as needed for flatulence   triamcinolone (KENALOG) 0.1 % cream  Self No No   Sig: Apply to affected spots twice a day for 2 weeks on the 1 week off.   Patient not taking: Reported on 2024      Facility-Administered Medications: None     Patient's Medications   Discharge Prescriptions    No medications on file     No discharge procedures on file.  ED SEPSIS DOCUMENTATION   Time reflects when diagnosis was documented in both MDM as applicable and the Disposition within this note       Time User Action Codes Description Comment    2024 10:22 PM Erne, Evert Add [R10.30] Lower abdominal pain     2024 10:22 PM Erne, Evert Add [R11.0] Nausea     2024 10:22 PM Erne, Evert Add [K59.00] Constipation     2024 12:46 AM Erne, Evert Add [N17.9] ERIKA (acute kidney injury) (HCC)     2024 12:46 AM Erne, Evert Add [R33.8] Acute urinary retention     2024 12:47 AM Erne, Evert Modify [R10.30] Lower abdominal pain     2024 12:47 AM Evert Huntley Modify [R33.8] Acute urinary retention     2024   1:54 AM Evert Huntley Add [D72.829] Leukocytosis                  Evert Huntley MD  11/22/24 0154

## 2024-11-22 NOTE — CONSULTS
Consultation - Urology   Name: Melchor Adrian 74 y.o. male I MRN: [id number]  Unit/Bed#: -01 I Date of Admission: 11/21/2024   Date of Service: 11/22/2024 I Hospital Day: 0   Inpatient consult to Urology  Consult performed by: Stephanie Smith PA-C  Consult ordered by: SHELLY Lofton        Physician Requesting Evaluation: Gilbert Kim,*   Reason for Evaluation / Principal Problem: Hydronephrosis, ERIKA, urinary retention, SIRS    Assessment & Plan  Urinary retention  Present on admission Gomez catheter placed for 1100 mL of return  Possibly due to recent cholecystectomy, anesthesia, constipation, with enlarged prostate noted on CT imaging, BPH  Start Flomax and Proscar  Maintain Gomez catheter, void trial in 7 to 10 days  Repeat US in 48 hours to evaluate hydronephrosis after gomez decompression  Trend Cr- downtrending    Office will call to schedule void trial in 7-10 days  No acute  surgical intervention  Will sign off. Please contact urology for any additional questions.   ERIKA (acute kidney injury) (HCC)  In setting of bladder distention, bilateral hydronephrosis, reflux  Kidney function trending down  Creatinine 1.4, admission 1.6.  Baseline is 1.1  Monitor for postobstructive diuresis  24 urine output 7300ml  Hydroureteronephrosis  Leading of distended bladder, reflux  Can repeat ultrasound in 48 hours versus outpatient depending on disposition  SIRS (systemic inflammatory response syndrome) (HCC)  Meets SIRS criteria on admission plan under urinary retention  UA obtained after abx and patient had been on augmentin outpatient  Continue abx        Subjective:   HPI: Melchor is a 74-year-old male past med history HTN, HLD, PTSD, hypothyroidism, recent cholecystectomy 11/14 who presented to the ED with lower abdominal pain.He reports sensation of inability to urinate, incontinence. He reports some constipation after surgery. Denies use of opioids for pain.  In the CT scan showed distended urinary  "bladder, enlarged prostate, bilateral hydronephrosis.  He had an ERIKA of creatinine 1.6 with baseline 1.1.  Leukocytosis of 29 on admission.  He is admitted to medicine for further management.  Urology was consulted due to urinary tension, bilateral hydronephrosis, ERIKA.    Review of Systems   Constitutional:  Negative for chills and fever.   Respiratory:  Negative for cough and shortness of breath.    Cardiovascular:  Negative for chest pain and palpitations.   Gastrointestinal:  Negative for abdominal pain and vomiting.   Genitourinary:  Negative for dysuria and hematuria.   Musculoskeletal:  Negative for arthralgias and back pain.   Skin:  Negative for color change and rash.   Neurological:  Negative for seizures and syncope.   All other systems reviewed and are negative.      Objective:    Vitals: Blood pressure 116/72, pulse 74, temperature 97.6 °F (36.4 °C), resp. rate 16, height 5' 7\" (1.702 m), weight 96.7 kg (213 lb 3 oz), SpO2 98%.,Body mass index is 33.39 kg/m².    Physical Exam  Constitutional:       General: He is not in acute distress.     Appearance: Normal appearance. He is normal weight. He is not ill-appearing or toxic-appearing.   HENT:      Head: Normocephalic and atraumatic.      Right Ear: External ear normal.      Left Ear: External ear normal.      Nose: Nose normal.      Mouth/Throat:      Mouth: Mucous membranes are moist.   Eyes:      General: No scleral icterus.     Conjunctiva/sclera: Conjunctivae normal.   Cardiovascular:      Rate and Rhythm: Normal rate.      Pulses: Normal pulses.   Pulmonary:      Effort: Pulmonary effort is normal.   Abdominal:      General: There is no distension.      Tenderness: There is no abdominal tenderness. There is no guarding.   Genitourinary:     Comments: Arauz drianing clear yellow urine  Neurological:      General: No focal deficit present.      Mental Status: He is alert and oriented to person, place, and time. Mental status is at baseline. "   Psychiatric:         Mood and Affect: Mood normal.         Behavior: Behavior normal.         Thought Content: Thought content normal.         Judgment: Judgment normal.         Imaging:  CT ABDOMEN AND PELVIS WITH IV CONTRAST     INDICATION: Diffuse lower abdominal pain tenderness, constipation, recent cholecystectomy for perforated gallbladder. .     COMPARISON: None.     TECHNIQUE: CT examination of the abdomen and pelvis was performed. Multiplanar 2D reformatted images were created from the source data.     This examination, like all CT scans performed in the [facility], was performed utilizing techniques to minimize radiation dose exposure, including the use of iterative reconstruction and automated exposure control. Radiation dose length   product (DLP) for this visit: 924 mGy-cm     IV Contrast: 100 mL of iohexol (OMNIPAQUE)  Enteric Contrast: Not administered.     FINDINGS:     ABDOMEN     LOWER CHEST: No clinically significant abnormality in the visualized lower chest.     LIVER/BILIARY TREE: Unremarkable.     GALLBLADDER: Post cholecystectomy.     SPLEEN: Unremarkable.     PANCREAS: Unremarkable.     ADRENAL GLANDS: Unremarkable.     KIDNEYS/URETERS: Bilateral hydronephrosis. Bilateral perinephric fat stranding..     STOMACH AND BOWEL: Liquid stool throughout the colon     APPENDIX: No findings to suggest appendicitis.     ABDOMINOPELVIC CAVITY: No ascites. No pneumoperitoneum. No lymphadenopathy.     VESSELS: Unremarkable for patient's age.     PELVIS     REPRODUCTIVE ORGANS: Enlarged prostate.     URINARY BLADDER: Distended     ABDOMINAL WALL/INGUINAL REGIONS: Unremarkable.     BONES: No acute fracture or suspicious osseous lesion.     IMPRESSION:     Bilateral hydroureteronephrosis with bladder distention suggesting bladder outlet obstruction        Workstation performed: [id number]  Labs:  Recent Labs     11/21/24  2226 11/22/24  0427   WBC 29.27* 23.61*       Recent Labs      11/21/24 2226 11/22/24  0427   HGB 14.3 12.5     Recent Labs     11/21/24 2226 11/22/24  0427   HCT 43.2 38.6     Recent Labs     11/21/24 2226 11/22/24  0427   CREATININE 1.67* 1.49*         History:    Past Medical History:   Diagnosis Date    Basal cell carcinoma     Left Upper back    GERD (gastroesophageal reflux disease)     Hyperlipidemia     Hypertension     PTSD (post-traumatic stress disorder)      Social History     Socioeconomic History    Marital status: /Civil Union     Spouse name: None    Number of children: None    Years of education: None    Highest education level: None   Occupational History    None   Tobacco Use    Smoking status: Former    Smokeless tobacco: Never   Vaping Use    Vaping status: Never Used   Substance and Sexual Activity    Alcohol use: Not Currently    Drug use: Never    Sexual activity: None   Other Topics Concern    None   Social History Narrative    None     Social Drivers of Health     Financial Resource Strain: Not on file   Food Insecurity: No Food Insecurity (11/22/2024)    Nursing - Inadequate Food Risk Classification     Worried About Running Out of Food in the Last Year: Never true     Ran Out of Food in the Last Year: Never true     Ran Out of Food in the Last Year: 1   Transportation Needs: No Transportation Needs (11/22/2024)    Nursing - Transportation Risk Classification     Lack of Transportation: Not on file     Lack of Transportation: 2   Physical Activity: Not on file   Stress: Not on file   Social Connections: Not on file   Intimate Partner Violence: Unknown (11/22/2024)    Nursing IPS     Feels Physically and Emotionally Safe: Not on file     Physically Hurt by Someone: Not on file     Humiliated or Emotionally Abused by Someone: Not on file     Physically Hurt by Someone: 2     Hurt or Threatened by Someone: 2   Housing Stability: Unknown (11/22/2024)    Nursing: Inadequate Housing Risk Classification     Has Housing: Not on file     Worried  About Losing Housing: Not on file     Unable to Get Utilities: Not on file     Unable to Pay for Housing in the Last Year: 2     Has Housin     Past Surgical History:   Procedure Laterality Date    CHOLECYSTECTOMY LAPAROSCOPIC N/A 2024    Procedure: CHOLECYSTECTOMY LAPAROSCOPIC;  Surgeon: Law Lew DO;  Location: [facility];  Service: General    COLONOSCOPY      KNEE SURGERY      MASS EXCISION N/A 2020    Procedure: EXCISION BIOPSY TISSUE LESION/MASS RIGHT BUTTOCK;  Surgeon: Tez Erickson MD;  Location: [facility];  Service: General     Family History   Problem Relation Age of Onset    Heart disease Mother     Heart disease Father        Stephanie Smith PA-C  Date: 2024 Time: 10:40 AM

## 2024-11-22 NOTE — ASSESSMENT & PLAN NOTE
Since in the emergency department with lower abdominal pain and abdominal distention, status post cholecystectomy for perforated gallbladder about a week ago  The patient states that initially he thought it was constipation but then he developed nausea and abdominal distention per his report  CT abdomen and pelvis with bilateral hydroureteronephrosis with bladder distention suggesting bladder outlet obstruction, Arauz catheter was placed in the ER.  Assessment the patient is stated after Arauz placement he feels so much better and believes that distention has improved.  Abdomen is soft nontender  Work shows leukocytosis, ERIKA, hyponatremia, transaminitis and elevated total bilirubin  Continue with Arauz catheter and pain management   urology consult

## 2024-11-22 NOTE — ASSESSMENT & PLAN NOTE
Multifactorial  Reports decreasing oral intake due to symptoms but also could be because/related to ERIKA  Normal saline x 2 bolus given   Monitor electrolytes with daily BMP

## 2024-11-22 NOTE — CASE MANAGEMENT
Case Management Assessment & Discharge Planning Note    Patient name Melchor Adrian  Location /-01 MRN [id number]  : 1950 Date 2024       Current Admission Date: 2024  Current Admission Diagnosis:Hydroureteronephrosis   Patient Active Problem List    Diagnosis Date Noted Date Diagnosed    Hydroureteronephrosis 2024     SIRS (systemic inflammatory response syndrome) ([facility]) 2024     ERIKA (acute kidney injury) ([facility]) 2024     Hyponatremia 2024     Lower abdominal pain 2024     Leukocytosis 2024     Urinary retention 2024     Positive blood culture 2024     Hypertension 11/15/2024     Gastroesophageal reflux disease 11/15/2024     Hyperlipidemia 11/15/2024     Posttraumatic stress disorder 11/15/2024     Mixed hyperlipidemia 11/15/2024     Post-traumatic stress disorder, unspecified 11/15/2024     Hypothyroid 11/15/2024     Sebaceous cyst 2020     Soft tissue mass 2020       LOS (days): 0  Geometric Mean LOS (GMLOS) (days):   Days to GMLOS:     OBJECTIVE:              Current admission status: Observation       Preferred Pharmacy:   [id number] [place] [facility] [address] [address] [address]  Phone: [phone] Fax: [phone]    [id number] [place] - [address]  [address]  [place] [address]  Phone: [phone] Fax: [phone]    [facility]  [address]  [address]  [address] [address]  Phone: [phone] Fax: [phone]    Primary Care Provider: No primary care provider on file.    Primary Insurance: [facility] [employer]  Secondary Insurance:     ASSESSMENT:  Active Health Care Proxies    There are no active Health Care Proxies on file.       Advance Directives  Does patient have a Health Care POA?: Yes  Does patient have Advance Directives?: Yes  Advance Directives: Power of   for health care  Primary Contact: Susanne         Readmission Root Cause  30 Day Readmission: Yes  During your hospital stay, did someone (provider, nurse, ) explain your care to you in a way you could understand?: Yes  Did you feel medically stable to leave the hospital?: Yes  Were you able to pay for your medication at the pharmacy?: Yes  Did you have reliable transportation to take you to your appointments?: Yes  During previous admission, was a post-acute recommendation made?: Yes  What post-acute resources were offered?: Offered, but declined  Patient was readmitted due to: Hyponatremia, Lower abdominal pain, Leukocytosis, Urinary retention  Action Plan: Arauz catheter and pain management   urology consult    Patient Information  Admitted from:: Home  Mental Status: Alert  During Assessment patient was accompanied by: Not accompanied during assessment  Assessment information provided by:: Patient  Primary Caregiver: Self  Support Systems: Daughter, Friend  County of Residence: [place]  What city do you live in?: [place]  Home entry access options. Select all that apply.: Stairs  Number of steps to enter home.: 2  Do the steps have railings?: No  Type of Current Residence: 2 story home  Upon entering residence, is there a bedroom on the main floor (no further steps)?: No  A bedroom is located on the following floor levels of residence (select all that apply):: 2nd Floor  Upon entering residence, is there a bathroom on the main floor (no further steps)?: Yes (Half)  Number of steps to 2nd floor from main floor: One Flight  Living Arrangements: Lives Alone  Is patient a ?: Yes  Is patient active with [facility] ([facility])?: Yes  Is patient service connected?: Yes    Activities of Daily Living Prior to Admission  Functional Status: Independent  Completes ADLs independently?: Yes  Ambulates independently?: Yes  Does patient use assisted devices?: Yes  Assisted Devices (DME) used:  Adele Cane  Does patient currently own DME?: Yes  What DME does the patient currently own?: Straight Cane  Does patient have a history of Outpatient Therapy (PT/OT)?: No (prior note says yes)  Does the patient have a history of Short-Term Rehab?: No (prior note said yes but pt declined)  Does patient have a history of HHC?: No  Does patient currently have HHC?: No         Patient Information Continued  Income Source: SSI/SSD  Does patient have prescription coverage?: Yes  Does patient receive dialysis treatments?: No  Does patient have a history of substance abuse?: Yes  Historical substance use preference: Alcohol/ETOH (prior note said cocaine but pt declined)  Is patient currently in treatment for substance abuse?: N/A - sober  Does patient have a history of Mental Health Diagnosis?: No         Means of Transportation  Means of Transport to Appts:: Drives Self      Social Determinants of Health (SDOH)      Flowsheet Row Most Recent Value   Housing Stability    In the last 12 months, was there a time when you were not able to pay the mortgage or rent on time? N   In the past 12 months, how many times have you moved where you were living? 0   At any time in the past 12 months, were you homeless or living in a shelter (including now)? N   Transportation Needs    In the past 12 months, has lack of transportation kept you from medical appointments or from getting medications? no   In the past 12 months, has lack of transportation kept you from meetings, work, or from getting things needed for daily living? No   Food Insecurity    Within the past 12 months, you worried that your food would run out before you got the money to buy more. Never true   Within the past 12 months, the food you bought just didn't last and you didn't have money to get more. Never true   Utilities    In the past 12 months has the electric, gas, oil, or water company threatened to shut off services in your home? No            DISCHARGE  DETAILS:    Discharge planning discussed with:: Patient  Freedom of Choice: Yes  Comments - Freedom of Choice: CM discussed freedom of choice as it pertains to discharge planning. Patient is alert oriented and competent to make decisions. Introduced self and role, explained role of CM in discharge planning. CM discussed current living situation and needs at discharge. Patient declined HHC, DME and rehab. Patient declined having any needs. Pt is aware and encouraged to seek CM for any questions or concerns. CM continues to follow.  CM contacted family/caregiver?: No- see comments (declined)  Were Treatment Team discharge recommendations reviewed with patient/caregiver?: Yes  Did patient/caregiver verbalize understanding of patient care needs?: Yes  Were patient/caregiver advised of the risks associated with not following Treatment Team discharge recommendations?: Yes         Requested Home Health Care         Is the patient interested in HHC at discharge?: No    DME Referral Provided  Referral made for DME?: No    Other Referral/Resources/Interventions Provided:  Referral Comments: declined needs    Would you like to participate in our [facility] service program?  : No - Declined    Treatment Team Recommendation: Home  Discharge Destination Plan:: Home  Transport at Discharge : Ride Share

## 2024-11-22 NOTE — ASSESSMENT & PLAN NOTE
Present on admission Gomez catheter placed for 1100 mL of return  Possibly due to recent cholecystectomy, anesthesia, constipation, with enlarged prostate noted on CT imaging, BPH  Start Flomax and Proscar  Maintain Gomez catheter, void trial in 7 to 10 days  Repeat US in 48 hours to evaluate hydronephrosis after gomez decompression  Trend Cr- downtrending    Office will call to schedule void trial in 7-10 days  No acute  surgical intervention  Will sign off. Please contact urology for any additional questions.

## 2024-11-23 ENCOUNTER — APPOINTMENT (OUTPATIENT)
Dept: ULTRASOUND IMAGING | Facility: HOSPITAL | Age: 74
DRG: 694 | End: 2024-11-23
Payer: COMMERCIAL

## 2024-11-23 PROBLEM — R65.10 SIRS (SYSTEMIC INFLAMMATORY RESPONSE SYNDROME) (HCC): Status: RESOLVED | Noted: 2024-11-22 | Resolved: 2024-11-23

## 2024-11-23 PROBLEM — E87.1 HYPONATREMIA: Status: RESOLVED | Noted: 2024-11-22 | Resolved: 2024-11-23

## 2024-11-23 PROBLEM — R33.9 URINARY RETENTION: Status: RESOLVED | Noted: 2024-11-22 | Resolved: 2024-11-23

## 2024-11-23 LAB
ANION GAP SERPL CALCULATED.3IONS-SCNC: 5 MMOL/L (ref 4–13)
BASOPHILS # BLD AUTO: 0.03 THOUSANDS/ÂΜL (ref 0–0.1)
BASOPHILS NFR BLD AUTO: 0 % (ref 0–1)
BUN SERPL-MCNC: 14 MG/DL (ref 5–25)
CALCIUM SERPL-MCNC: 7.9 MG/DL (ref 8.4–10.2)
CHLORIDE SERPL-SCNC: 109 MMOL/L (ref 96–108)
CO2 SERPL-SCNC: 23 MMOL/L (ref 21–32)
CREAT SERPL-MCNC: 1.08 MG/DL (ref 0.6–1.3)
EOSINOPHIL # BLD AUTO: 0.12 THOUSAND/ÂΜL (ref 0–0.61)
EOSINOPHIL NFR BLD AUTO: 1 % (ref 0–6)
ERYTHROCYTE [DISTWIDTH] IN BLOOD BY AUTOMATED COUNT: 15.2 % (ref 11.6–15.1)
GFR SERPL CREATININE-BSD FRML MDRD: 67 ML/MIN/1.73SQ M
GLUCOSE P FAST SERPL-MCNC: 91 MG/DL (ref 65–99)
GLUCOSE SERPL-MCNC: 91 MG/DL (ref 65–140)
HCT VFR BLD AUTO: 37.2 % (ref 36.5–49.3)
HGB BLD-MCNC: 11.5 G/DL (ref 12–17)
IMM GRANULOCYTES # BLD AUTO: 0.24 THOUSAND/UL (ref 0–0.2)
IMM GRANULOCYTES NFR BLD AUTO: 2 % (ref 0–2)
LYMPHOCYTES # BLD AUTO: 1.83 THOUSANDS/ÂΜL (ref 0.6–4.47)
LYMPHOCYTES NFR BLD AUTO: 13 % (ref 14–44)
MCH RBC QN AUTO: 27.7 PG (ref 26.8–34.3)
MCHC RBC AUTO-ENTMCNC: 30.9 G/DL (ref 31.4–37.4)
MCV RBC AUTO: 90 FL (ref 82–98)
MONOCYTES # BLD AUTO: 1.27 THOUSAND/ÂΜL (ref 0.17–1.22)
MONOCYTES NFR BLD AUTO: 9 % (ref 4–12)
NEUTROPHILS # BLD AUTO: 10.48 THOUSANDS/ÂΜL (ref 1.85–7.62)
NEUTS SEG NFR BLD AUTO: 75 % (ref 43–75)
NRBC BLD AUTO-RTO: 0 /100 WBCS
PLATELET # BLD AUTO: 429 THOUSANDS/UL (ref 149–390)
PMV BLD AUTO: 9.8 FL (ref 8.9–12.7)
POTASSIUM SERPL-SCNC: 4 MMOL/L (ref 3.5–5.3)
RBC # BLD AUTO: 4.15 MILLION/UL (ref 3.88–5.62)
SODIUM SERPL-SCNC: 137 MMOL/L (ref 135–147)
WBC # BLD AUTO: 13.97 THOUSAND/UL (ref 4.31–10.16)

## 2024-11-23 PROCEDURE — 85025 COMPLETE CBC W/AUTO DIFF WBC: CPT | Performed by: INTERNAL MEDICINE

## 2024-11-23 PROCEDURE — 76775 US EXAM ABDO BACK WALL LIM: CPT

## 2024-11-23 PROCEDURE — 80048 BASIC METABOLIC PNL TOTAL CA: CPT | Performed by: INTERNAL MEDICINE

## 2024-11-23 PROCEDURE — 99233 SBSQ HOSP IP/OBS HIGH 50: CPT | Performed by: INTERNAL MEDICINE

## 2024-11-23 RX ORDER — LEVOTHYROXINE SODIUM 25 UG/1
25 TABLET ORAL
Status: DISCONTINUED | OUTPATIENT
Start: 2024-11-23 | End: 2024-11-24 | Stop reason: HOSPADM

## 2024-11-23 RX ADMIN — TAMSULOSIN HYDROCHLORIDE 0.4 MG: 0.4 CAPSULE ORAL at 16:54

## 2024-11-23 RX ADMIN — HEPARIN SODIUM 5000 UNITS: 5000 INJECTION, SOLUTION INTRAVENOUS; SUBCUTANEOUS at 21:04

## 2024-11-23 RX ADMIN — HEPARIN SODIUM 5000 UNITS: 5000 INJECTION, SOLUTION INTRAVENOUS; SUBCUTANEOUS at 05:28

## 2024-11-23 RX ADMIN — PANTOPRAZOLE SODIUM 40 MG: 40 TABLET, DELAYED RELEASE ORAL at 05:28

## 2024-11-23 RX ADMIN — HEPARIN SODIUM 5000 UNITS: 5000 INJECTION, SOLUTION INTRAVENOUS; SUBCUTANEOUS at 13:59

## 2024-11-23 RX ADMIN — LEVOTHYROXINE SODIUM 25 MCG: 0.03 TABLET ORAL at 09:43

## 2024-11-23 RX ADMIN — FLUOXETINE HYDROCHLORIDE 20 MG: 20 CAPSULE ORAL at 08:41

## 2024-11-23 RX ADMIN — FINASTERIDE 5 MG: 5 TABLET, FILM COATED ORAL at 08:42

## 2024-11-23 RX ADMIN — CEFTRIAXONE SODIUM 1000 MG: 10 INJECTION, POWDER, FOR SOLUTION INTRAVENOUS at 20:31

## 2024-11-23 NOTE — PROGRESS NOTES
Progress Note - Hospitalist   Name: Melchor Adrian 74 y.o. male I MRN: [id number]  Unit/Bed#: -01 I Date of Admission: 11/21/2024   Date of Service: 11/23/2024 I Hospital Day: 0    Assessment & Plan  Lower abdominal pain  Since in the emergency department with lower abdominal pain and abdominal distention, status post cholecystectomy for perforated gallbladder about a week ago  The patient states that initially he thought it was constipation but then he developed nausea and abdominal distention per his report  CT abdomen and pelvis with bilateral hydroureteronephrosis with bladder distention suggesting bladder outlet obstruction, Arauz catheter was placed in the ER.    Patient is now status post Arauz catheter insertion  With improvement in his suprapubic pain.  No hematuria noted.  Urology saw patient and they are planning to discharge him with Arauz catheter  Follow-up renal ultrasound to assess improvement in hydronephrosis  Continue tamsulosin, finasteride  If CBC and WBC count stable patient tentative for discharge tomorrow pending clinical status  ERIKA (acute kidney injury) (HCC)  Likely to be postrenal caused by bladder obstruction obstruction  Has received IV fluids, bladder decompression     Kidney function improving  Monitor  Continue to avoid nephrotoxins    Hyponatremia (Resolved: 11/23/2024)  Present on admission with sodium of 128  Has resolved  Leukocytosis  To be reactive  The patient does not meet SIRS or sepsis criteria as of now  Urinalysis reveals WBC 2-4, negative for bacteria leukocytosis or nitrates  Now improving  Monitor  Gastroesophageal reflux disease  Famotidine at home, continue with Protonix  Hydroureteronephrosis  Follow results of ultrasound  SIRS (systemic inflammatory response syndrome) (HCC) (Resolved: 11/23/2024)    Urinary retention (Resolved: 11/23/2024)      VTE Pharmacologic Prophylaxis: VTE Score: 5 High Risk (Score >/= 5) - Pharmacological DVT Prophylaxis Ordered:  heparin. Sequential Compression Devices Ordered.    Mobility:   Basic Mobility Inpatient Raw Score: 23  JH-HLM Goal: 7: Walk 25 feet or more  JH-HLM Achieved: 7: Walk 25 feet or more  JH-HLM Goal achieved. Continue to encourage appropriate mobility.    Patient Centered Rounds: I performed bedside rounds with nursing staff today.   Discussions with Specialists or Other Care Team Provider:   Discussed with care management team    Education and Discussions with Family / Patient: Patient declined call to .     Current Length of Stay: 0 day(s)  Current Patient Status: Observation   Certification Statement: The patient will continue to require additional inpatient hospital stay due to monitoring of CBC  Discharge Plan: Anticipate discharge tomorrow to home.    Code Status: Level 1 - Full Code    Subjective     Patient evaluated today.  The patient is very upset that his currently on a cardiac diet even though he denies any cardiac issues.  He otherwise denies any chest pain shortness of breath abdominal pain.  His Arauz catheter is working properly without any issues.  He does have an elevated WBC count but decreased compared to yesterday    Objective :  Temp:  [97.8 °F (36.6 °C)-98.1 °F (36.7 °C)] 97.8 °F (36.6 °C)  HR:  [80-89] 82  BP: (110-130)/(58-81) 126/81  Resp:  [20] 20  SpO2:  [91 %-96 %] 96 %  O2 Device: None (Room air)    Body mass index is 32.73 kg/m².     Input and Output Summary (last 24 hours):     Intake/Output Summary (Last 24 hours) at 11/23/2024 1411  Last data filed at 11/23/2024 0914  Gross per 24 hour   Intake 1080 ml   Output 2750 ml   Net -1670 ml       Physical Exam  Vitals and nursing note reviewed.   Constitutional:       Appearance: Normal appearance.      Comments: Male patient in bed, awake   HENT:      Head: Normocephalic and atraumatic.      Right Ear: External ear normal.      Left Ear: External ear normal.      Nose: Nose normal. No congestion or rhinorrhea.       Mouth/Throat:      Mouth: Mucous membranes are moist.      Pharynx: Oropharynx is clear. No oropharyngeal exudate or posterior oropharyngeal erythema.   Eyes:      General: No scleral icterus.        Right eye: No discharge.         Left eye: No discharge.      Pupils: Pupils are equal, round, and reactive to light.   Neck:      Vascular: No carotid bruit.   Cardiovascular:      Rate and Rhythm: Normal rate and regular rhythm.      Pulses: Normal pulses.      Heart sounds: No murmur heard.     No friction rub. No gallop.   Pulmonary:      Effort: Pulmonary effort is normal. No respiratory distress.      Breath sounds: Normal breath sounds. No stridor. No wheezing, rhonchi or rales.   Abdominal:      General: Abdomen is flat. Bowel sounds are normal. There is no distension.      Palpations: Abdomen is soft. There is no mass.      Tenderness: There is no abdominal tenderness. There is no guarding or rebound.      Hernia: No hernia is present.   Genitourinary:     Comments: Arauz catheter in place draining clear urine  Musculoskeletal:         General: No swelling, tenderness, deformity or signs of injury. Normal range of motion.      Cervical back: Normal range of motion. No rigidity. No muscular tenderness.   Lymphadenopathy:      Cervical: No cervical adenopathy.   Skin:     General: Skin is warm and dry.      Capillary Refill: Capillary refill takes less than 2 seconds.      Coloration: Skin is not jaundiced or pale.      Findings: No bruising or erythema.   Neurological:      General: No focal deficit present.      Mental Status: He is alert and oriented to person, place, and time. Mental status is at baseline.      Cranial Nerves: No cranial nerve deficit.      Sensory: No sensory deficit.      Motor: No weakness.      Coordination: Coordination normal.      Deep Tendon Reflexes: Reflexes normal.   Psychiatric:         Mood and Affect: Mood normal.         Behavior: Behavior normal.         Thought Content:  Thought content normal.         Judgment: Judgment normal.           Lines/Drains:  Lines/Drains/Airways       Active Status       Name Placement date Placement time Site Days    Urethral Catheter 18 Fr. 11/22/24  0058  --  1                  Urinary Catheter:  Goal for removal: N/A- Discharging with Arauz                 Lab Results: I have reviewed the following results:   Results from last 7 days   Lab Units 11/23/24  0435   WBC Thousand/uL 13.97*   HEMOGLOBIN g/dL 11.5*   HEMATOCRIT % 37.2   PLATELETS Thousands/uL 429*   SEGS PCT % 75   LYMPHO PCT % 13*   MONO PCT % 9   EOS PCT % 1     Results from last 7 days   Lab Units 11/23/24  0435 11/22/24  0427   SODIUM mmol/L 137 130*   POTASSIUM mmol/L 4.0 4.0   CHLORIDE mmol/L 109* 101   CO2 mmol/L 23 22   BUN mg/dL 14 17   CREATININE mg/dL 1.08 1.49*   ANION GAP mmol/L 5 7   CALCIUM mg/dL 7.9* 7.6*   ALBUMIN g/dL  --  2.7*   TOTAL BILIRUBIN mg/dL  --  1.06*   ALK PHOS U/L  --  158*   ALT U/L  --  102*   AST U/L  --  97*   GLUCOSE RANDOM mg/dL 91 108     Results from last 7 days   Lab Units 11/21/24  2254   INR  1.08             Results from last 7 days   Lab Units 11/21/24  2254   LACTIC ACID mmol/L 1.2   PROCALCITONIN ng/ml 0.15       Recent Cultures (last 7 days):   Results from last 7 days   Lab Units 11/21/24  2333 11/21/24  2254   BLOOD CULTURE  No Growth at 24 hrs. No Growth at 24 hrs.       Imaging Results Review: No pertinent imaging studies reviewed.  Other Study Results Review: No additional pertinent studies reviewed.    Last 24 Hours Medication List:     Current Facility-Administered Medications:     acetaminophen (TYLENOL) tablet 650 mg, Q6H PRN    cefTRIAXone (ROCEPHIN) 1,000 mg in dextrose 5 % 50 mL IVPB, Q24H, Last Rate: 1,000 mg (11/22/24 2222)    finasteride (PROSCAR) tablet 5 mg, Daily    FLUoxetine (PROzac) capsule 20 mg, Daily    heparin (porcine) subcutaneous injection 5,000 Units, Q8H ASIF **AND** [CANCELED] Platelet count, Once    levothyroxine  tablet 25 mcg, Early Morning    lidocaine (URO-JET) 2 % urethral/mucosal gel 1 Application, Once    pantoprazole (PROTONIX) EC tablet 40 mg, Early Morning    polyethylene glycol (MIRALAX) packet 17 g, Daily PRN    sodium chloride 0.9 % infusion, Continuous, Last Rate: 75 mL/hr (11/22/24 0529)    tamsulosin (FLOMAX) capsule 0.4 mg, Daily With Dinner    Administrative Statements   Today, Patient Was Seen By: Gilbert Borrego MD      **Please Note: This note may have been constructed using a voice recognition system.**

## 2024-11-23 NOTE — ASSESSMENT & PLAN NOTE
Likely to be postrenal caused by bladder obstruction obstruction  Has received IV fluids, bladder decompression     Kidney function improving  Monitor  Continue to avoid nephrotoxins

## 2024-11-23 NOTE — ASSESSMENT & PLAN NOTE
To be reactive  The patient does not meet SIRS or sepsis criteria as of now  Urinalysis reveals WBC 2-4, negative for bacteria leukocytosis or nitrates  Now improving  Monitor

## 2024-11-23 NOTE — ASSESSMENT & PLAN NOTE
Since in the emergency department with lower abdominal pain and abdominal distention, status post cholecystectomy for perforated gallbladder about a week ago  The patient states that initially he thought it was constipation but then he developed nausea and abdominal distention per his report  CT abdomen and pelvis with bilateral hydroureteronephrosis with bladder distention suggesting bladder outlet obstruction, Arauz catheter was placed in the ER.    Patient is now status post Arauz catheter insertion  With improvement in his suprapubic pain.  No hematuria noted.  Urology saw patient and they are planning to discharge him with Arauz catheter  Follow-up renal ultrasound to assess improvement in hydronephrosis  Continue tamsulosin, finasteride  If CBC and WBC count stable patient tentative for discharge tomorrow pending clinical status

## 2024-11-24 VITALS
HEART RATE: 85 BPM | TEMPERATURE: 98.1 F | SYSTOLIC BLOOD PRESSURE: 143 MMHG | HEIGHT: 67 IN | BODY MASS INDEX: 32.94 KG/M2 | OXYGEN SATURATION: 94 % | WEIGHT: 209.88 LBS | DIASTOLIC BLOOD PRESSURE: 82 MMHG | RESPIRATION RATE: 18 BRPM

## 2024-11-24 LAB
ANION GAP SERPL CALCULATED.3IONS-SCNC: 6 MMOL/L (ref 4–13)
BASOPHILS # BLD AUTO: 0.04 THOUSANDS/ΜL (ref 0–0.1)
BASOPHILS NFR BLD AUTO: 0 % (ref 0–1)
BUN SERPL-MCNC: 15 MG/DL (ref 5–25)
CALCIUM SERPL-MCNC: 7.9 MG/DL (ref 8.4–10.2)
CHLORIDE SERPL-SCNC: 110 MMOL/L (ref 96–108)
CO2 SERPL-SCNC: 25 MMOL/L (ref 21–32)
CREAT SERPL-MCNC: 1.01 MG/DL (ref 0.6–1.3)
EOSINOPHIL # BLD AUTO: 0.14 THOUSAND/ΜL (ref 0–0.61)
EOSINOPHIL NFR BLD AUTO: 2 % (ref 0–6)
ERYTHROCYTE [DISTWIDTH] IN BLOOD BY AUTOMATED COUNT: 15.2 % (ref 11.6–15.1)
GFR SERPL CREATININE-BSD FRML MDRD: 72 ML/MIN/1.73SQ M
GLUCOSE SERPL-MCNC: 93 MG/DL (ref 65–140)
HCT VFR BLD AUTO: 35.6 % (ref 36.5–49.3)
HGB BLD-MCNC: 11.3 G/DL (ref 12–17)
IMM GRANULOCYTES # BLD AUTO: 0.12 THOUSAND/UL (ref 0–0.2)
IMM GRANULOCYTES NFR BLD AUTO: 1 % (ref 0–2)
LYMPHOCYTES # BLD AUTO: 2.22 THOUSANDS/ΜL (ref 0.6–4.47)
LYMPHOCYTES NFR BLD AUTO: 24 % (ref 14–44)
MCH RBC QN AUTO: 27.8 PG (ref 26.8–34.3)
MCHC RBC AUTO-ENTMCNC: 31.7 G/DL (ref 31.4–37.4)
MCV RBC AUTO: 88 FL (ref 82–98)
MONOCYTES # BLD AUTO: 0.65 THOUSAND/ΜL (ref 0.17–1.22)
MONOCYTES NFR BLD AUTO: 7 % (ref 4–12)
NEUTROPHILS # BLD AUTO: 5.93 THOUSANDS/ΜL (ref 1.85–7.62)
NEUTS SEG NFR BLD AUTO: 66 % (ref 43–75)
NRBC BLD AUTO-RTO: 0 /100 WBCS
PLATELET # BLD AUTO: 417 THOUSANDS/UL (ref 149–390)
PMV BLD AUTO: 9.5 FL (ref 8.9–12.7)
POTASSIUM SERPL-SCNC: 4 MMOL/L (ref 3.5–5.3)
RBC # BLD AUTO: 4.06 MILLION/UL (ref 3.88–5.62)
SODIUM SERPL-SCNC: 141 MMOL/L (ref 135–147)
WBC # BLD AUTO: 9.1 THOUSAND/UL (ref 4.31–10.16)

## 2024-11-24 PROCEDURE — 85025 COMPLETE CBC W/AUTO DIFF WBC: CPT | Performed by: INTERNAL MEDICINE

## 2024-11-24 PROCEDURE — 80048 BASIC METABOLIC PNL TOTAL CA: CPT | Performed by: INTERNAL MEDICINE

## 2024-11-24 PROCEDURE — 99239 HOSP IP/OBS DSCHRG MGMT >30: CPT | Performed by: INTERNAL MEDICINE

## 2024-11-24 RX ORDER — FINASTERIDE 5 MG/1
5 TABLET, FILM COATED ORAL DAILY
Qty: 30 TABLET | Refills: 0 | Status: SHIPPED | OUTPATIENT
Start: 2024-11-25

## 2024-11-24 RX ORDER — TAMSULOSIN HYDROCHLORIDE 0.4 MG/1
0.4 CAPSULE ORAL
Qty: 30 CAPSULE | Refills: 0 | Status: SHIPPED | OUTPATIENT
Start: 2024-11-24

## 2024-11-24 RX ADMIN — FLUOXETINE HYDROCHLORIDE 20 MG: 20 CAPSULE ORAL at 09:45

## 2024-11-24 RX ADMIN — HEPARIN SODIUM 5000 UNITS: 5000 INJECTION, SOLUTION INTRAVENOUS; SUBCUTANEOUS at 06:12

## 2024-11-24 RX ADMIN — LEVOTHYROXINE SODIUM 25 MCG: 0.03 TABLET ORAL at 06:12

## 2024-11-24 RX ADMIN — PANTOPRAZOLE SODIUM 40 MG: 40 TABLET, DELAYED RELEASE ORAL at 06:12

## 2024-11-24 RX ADMIN — FINASTERIDE 5 MG: 5 TABLET, FILM COATED ORAL at 09:45

## 2024-11-24 NOTE — CASE MANAGEMENT
Case Management Discharge Planning Note    Patient name Melchor Adrian  Location /-01 MRN [id number]  : 1950 Date 2024       Current Admission Date: 2024  Current Admission Diagnosis:Hydroureteronephrosis   Patient Active Problem List    Diagnosis Date Noted Date Diagnosed    Hydroureteronephrosis 2024     ERIKA (acute kidney injury) (HCC) 2024     Lower abdominal pain 2024     Leukocytosis 2024     Positive blood culture 2024     Hypertension 11/15/2024     Gastroesophageal reflux disease 11/15/2024     Hyperlipidemia 11/15/2024     Posttraumatic stress disorder 11/15/2024     Mixed hyperlipidemia 11/15/2024     Post-traumatic stress disorder, unspecified 11/15/2024     Hypothyroid 11/15/2024     Sebaceous cyst 2020     Soft tissue mass 2020       LOS (days): 1  Geometric Mean LOS (GMLOS) (days):   Days to GMLOS:     OBJECTIVE:  Risk of Unplanned Readmission Score: 13.52         Current admission status: Inpatient   Preferred Pharmacy:   [id number] [place] [facility] [address] [place], [place] [address] [address]  Phone: [phone] Fax: [phone]    [id number] [place] - [address] [facility]  [address] [facility] [address]  Phone: [phone] Fax: [phone]    [facility] [address] [address]  Phone: [phone] Fax: [phone]    Primary Care Provider: No primary care provider on file.    Primary Insurance: [facility] [employer] [facility]  Secondary Insurance:     DISCHARGE DETAILS:    Discharge planning discussed with:: patient  Freedom of Choice: Yes  Comments - Freedom of Choice: Pt to be dced home today with [facility] for Arauz care.  [facility] informed of d/c via [facility].  Pt requesting Lyft transport home and this was requested for 13:30.  Waiver signed                     Requested Home Health Care          Is the patient interested in HHC at discharge?: Yes    DME Referral Provided  Referral made for DME?: No    Other Referral/Resources/Interventions Provided:  Interventions: HHC, Transportation  Referral Comments: Pt to be dced home today with [facility] for [facility]. [facility] informed of d/c via [facility]. [facility] was [facility] the [facility].  Pt requesting Lyft transport home and this was requested for 13:30. Waiver signed    Would you like to participate in our [facility] service program?  : No - Declined    Treatment Team Recommendation: Home with Home Health Care  Discharge Destination Plan:: Home with Home Health Care  Transport at Discharge : Other (Comment) (Lyft-13;30)           ETA of Transport (Date): 11/24/24  ETA of Transport (Time): 0160

## 2024-11-24 NOTE — PLAN OF CARE
Problem: PAIN - ADULT  Goal: Verbalizes/displays adequate comfort level or baseline comfort level  Description: Interventions:  - Encourage patient to monitor pain and request assistance  - Assess pain using appropriate pain scale  - Administer analgesics based on type and severity of pain and evaluate response  - Implement non-pharmacological measures as appropriate and evaluate response  - Consider cultural and social influences on pain and pain management  - Notify physician/advanced practitioner if interventions unsuccessful or patient reports new pain  Outcome: Progressing     Problem: INFECTION - ADULT  Goal: Absence or prevention of progression during hospitalization  Description: INTERVENTIONS:  - Assess and monitor for signs and symptoms of infection  - Monitor lab/diagnostic results  - Monitor all insertion sites, i.e. indwelling lines, tubes, and drains  - Monitor endotracheal if appropriate and nasal secretions for changes in amount and color  - [place] appropriate cooling/warming therapies per order  - Administer medications as ordered  - Instruct and encourage patient and family to use good hand hygiene technique  - Identify and instruct in appropriate isolation precautions for identified infection/condition  Outcome: Progressing  Goal: Absence of fever/infection during neutropenic period  Description: INTERVENTIONS:  - Monitor WBC    Outcome: Progressing     Problem: DISCHARGE PLANNING  Goal: Discharge to home or other facility with appropriate resources  Description: INTERVENTIONS:  - Identify barriers to discharge w/patient and caregiver  - Arrange for needed discharge resources and transportation as appropriate  - Identify discharge learning needs (meds, wound care, etc.)  - Arrange for interpretive services to assist at discharge as needed  - Refer to Case Management Department for coordinating discharge planning if the patient needs post-hospital services based on physician/advanced  Problem: Physical Therapy Goal  Goal: Physical Therapy Goal  Outcome: Ongoing (interventions implemented as appropriate)  Initial eval completed.  Results, POC, and therapy recommendations discussed with patient and family.  Complete evaluation documentation to follow.     Pt requires 1 person assistance (supervision) for tranfers and gait with no AD.     Giovana Stacy, PT  4/27/2018  [phone] (pager)             practitioner order or complex needs related to functional status, cognitive ability, or social support system  Outcome: Progressing     Problem: Knowledge Deficit  Goal: Patient/family/caregiver demonstrates understanding of disease process, treatment plan, medications, and discharge instructions  Description: Complete learning assessment and assess knowledge base.  Interventions:  - Provide teaching at level of understanding  - Provide teaching via preferred learning methods  Outcome: Progressing     Problem: Nutrition/Hydration-ADULT  Goal: Nutrient/Hydration intake appropriate for improving, restoring or maintaining nutritional needs  Description: Monitor and assess patient's nutrition/hydration status for malnutrition. Collaborate with interdisciplinary team and initiate plan and interventions as ordered.  Monitor patient's weight and dietary intake as ordered or per policy. Utilize nutrition screening tool and intervene as necessary. Determine patient's food preferences and provide high-protein, high-caloric foods as appropriate.     INTERVENTIONS:  - Monitor oral intake, urinary output, labs, and treatment plans  - Assess nutrition and hydration status and recommend course of action  - Evaluate amount of meals eaten  - Assist patient with eating if necessary   - Allow adequate time for meals  - Recommend/ encourage appropriate diets, oral nutritional supplements, and vitamin/mineral supplements  - Order, calculate, and assess calorie counts as needed  - Recommend, monitor, and adjust tube feedings and TPN/PPN based on assessed needs  - Assess need for intravenous fluids  - Provide specific nutrition/hydration education as appropriate  - Include patient/family/caregiver in decisions related to nutrition  Outcome: Progressing

## 2024-11-24 NOTE — PLAN OF CARE
Problem: PAIN - ADULT  Goal: Verbalizes/displays adequate comfort level or baseline comfort level  Description: Interventions:  - Encourage patient to monitor pain and request assistance  - Assess pain using appropriate pain scale  - Administer analgesics based on type and severity of pain and evaluate response  - Implement non-pharmacological measures as appropriate and evaluate response  - Consider cultural and social influences on pain and pain management  - Notify physician/advanced practitioner if interventions unsuccessful or patient reports new pain  Outcome: Progressing     Problem: INFECTION - ADULT  Goal: Absence or prevention of progression during hospitalization  Description: INTERVENTIONS:  - Assess and monitor for signs and symptoms of infection  - Monitor lab/diagnostic results  - Monitor all insertion sites, i.e. indwelling lines, tubes, and drains  - Monitor endotracheal if appropriate and nasal secretions for changes in amount and color  - [place] appropriate cooling/warming therapies per order  - Administer medications as ordered  - Instruct and encourage patient and family to use good hand hygiene technique  - Identify and instruct in appropriate isolation precautions for identified infection/condition  Outcome: Progressing  Goal: Absence of fever/infection during neutropenic period  Description: INTERVENTIONS:  - Monitor WBC    Outcome: Progressing     Problem: SAFETY ADULT  Goal: Patient will remain free of falls  Description: INTERVENTIONS:  - Educate patient/family on patient safety including physical limitations  - Instruct patient to call for assistance with activity   - Consult OT/PT to assist with strengthening/mobility   - Keep Call bell within reach  - Keep bed low and locked with side rails adjusted as appropriate  - Keep care items and personal belongings within reach  - Initiate and maintain comfort rounds  Outcome: Progressing  Goal: Maintain or return to baseline ADL  function  Description: INTERVENTIONS:  -  Assess patient's ability to carry out ADLs; assess patient's baseline for ADL function and identify physical deficits which impact ability to perform ADLs (bathing, care of mouth/teeth, toileting, grooming, dressing, etc.)  - Assess/evaluate cause of self-care deficits   - Assess range of motion  - Assess patient's mobility; develop plan if impaired  - Assess patient's need for assistive devices and provide as appropriate  - Encourage maximum independence but intervene and supervise when necessary  - Involve family in performance of ADLs  - Assess for home care needs following discharge   - Consider OT consult to assist with ADL evaluation and planning for discharge  - Provide patient education as appropriate  Outcome: Progressing  Goal: Maintains/Returns to pre admission functional level  Description: INTERVENTIONS:  - Perform AM-PAC 6 Click Basic Mobility/ Daily Activity assessment daily.  - Set and communicate daily mobility goal to care team and patient/family/caregiver.   - Collaborate with rehabilitation services on mobility goals if consulted  - Record patient progress and toleration of activity level   Outcome: Progressing     Problem: DISCHARGE PLANNING  Goal: Discharge to home or other facility with appropriate resources  Description: INTERVENTIONS:  - Identify barriers to discharge w/patient and caregiver  - Arrange for needed discharge resources and transportation as appropriate  - Identify discharge learning needs (meds, wound care, etc.)  - Arrange for interpretive services to assist at discharge as needed  - Refer to Case Management Department for coordinating discharge planning if the patient needs post-hospital services based on physician/advanced practitioner order or complex needs related to functional status, cognitive ability, or social support system  Outcome: Progressing     Problem: Knowledge Deficit  Goal: Patient/family/caregiver demonstrates  understanding of disease process, treatment plan, medications, and discharge instructions  Description: Complete learning assessment and assess knowledge base.  Interventions:  - Provide teaching at level of understanding  - Provide teaching via preferred learning methods  Outcome: Progressing     Problem: Nutrition/Hydration-ADULT  Goal: Nutrient/Hydration intake appropriate for improving, restoring or maintaining nutritional needs  Description: Monitor and assess patient's nutrition/hydration status for malnutrition. Collaborate with interdisciplinary team and initiate plan and interventions as ordered.  Monitor patient's weight and dietary intake as ordered or per policy. Utilize nutrition screening tool and intervene as necessary. Determine patient's food preferences and provide high-protein, high-caloric foods as appropriate.     INTERVENTIONS:  - Monitor oral intake, urinary output, labs, and treatment plans  - Assess nutrition and hydration status and recommend course of action  - Evaluate amount of meals eaten  - Assist patient with eating if necessary   - Allow adequate time for meals  - Recommend/ encourage appropriate diets, oral nutritional supplements, and vitamin/mineral supplements  - Order, calculate, and assess calorie counts as needed  - Recommend, monitor, and adjust tube feedings and TPN/PPN based on assessed needs  - Assess need for intravenous fluids  - Provide specific nutrition/hydration education as appropriate  - Include patient/family/caregiver in decisions related to nutrition  Outcome: Progressing

## 2024-11-24 NOTE — DISCHARGE SUMMARY
Discharge Summary - Hospitalist   Name: Melchor Adrian 74 y.o. male I MRN: [id number]  Unit/Bed#: -01 I Date of Admission: 11/21/2024   Date of Service: 11/24/2024 I Hospital Day: 1         Discharge Diagnosis:    Urinary retention s/p Gomez catheter  Hydronephrosis - improved  ERIKA - improved  Hyponatremia, improved  SIRS - resolved  Recent hospitalization for acute cholecystitis    Discharging Physician / Practitioner: Gilbert Borrego MD  PCP: No primary care provider on file.  Admission Date:   Admission Orders (From admission, onward)       Ordered        11/23/24 1418  INPATIENT ADMISSION  Once            11/22/24 0204  Place in Observation  Once                          Discharge Date: 11/24/24      Consultations During Hospital Stay:  Urology    Significant Findings / Test Results:   US kidney and bladder [[id number]] Collected: 11/24/24 0706   Order Status: Completed Updated: 11/24/24 0710   Narrative:     RENAL ULTRASOUND    INDICATION: Evaluate hydronephrosis.    COMPARISON: No prior renal ultrasound. Correlation CT abdomen pelvis November 21, 2024    TECHNIQUE: Ultrasound of the retroperitoneum was performed with a curvilinear transducer utilizing volumetric sweeps and still imaging techniques.    FINDINGS:    KIDNEYS:  Symmetric and normal size.  Right kidney: 10.5 x 6.4 x 5.7 cm. Volume 199.6 mL  Left kidney: 10.5 x 5.9 x 5.5 cm. Volume 179.9 mL    Right kidney  Normal echogenicity and contour.  No mass is identified.  No hydronephrosis.  No shadowing calculi.  No perinephric fluid collections.    Left kidney  Normal echogenicity and contour.  No mass is identified.  No hydronephrosis.  No shadowing calculi.  No perinephric fluid collections.    URETERS:  Nonvisualized.    BLADDER:  A gomez catheter is in place, decompressing the bladder and limiting its evaluation.       Impression:       No hydronephrosis.        Workstation performed: [id number]   CT abdomen pelvis with contrast  [[id number]] Collected: 11/22/24 0030   Order Status: Completed Updated: 11/22/24 0037   Narrative:     CT ABDOMEN AND PELVIS WITH IV CONTRAST    INDICATION: Diffuse lower abdominal pain tenderness, constipation, recent cholecystectomy for perforated gallbladder. .    COMPARISON: None.    TECHNIQUE: CT examination of the abdomen and pelvis was performed. Multiplanar 2D reformatted images were created from the source data.    This examination, like all CT scans performed in the [facility], was performed utilizing techniques to minimize radiation dose exposure, including the use of iterative reconstruction and automated exposure control. Radiation dose length  product (DLP) for this visit: 924 mGy-cm    IV Contrast: 100 mL of iohexol (OMNIPAQUE)  Enteric Contrast: Not administered.    FINDINGS:    ABDOMEN    LOWER CHEST: No clinically significant abnormality in the visualized lower chest.    LIVER/BILIARY TREE: Unremarkable.    GALLBLADDER: Post cholecystectomy.    SPLEEN: Unremarkable.    PANCREAS: Unremarkable.    ADRENAL GLANDS: Unremarkable.    KIDNEYS/URETERS: Bilateral hydronephrosis. Bilateral perinephric fat stranding..    STOMACH AND BOWEL: Liquid stool throughout the colon    APPENDIX: No findings to suggest appendicitis.    ABDOMINOPELVIC CAVITY: No ascites. No pneumoperitoneum. No lymphadenopathy.    VESSELS: Unremarkable for patient's age.    PELVIS    REPRODUCTIVE ORGANS: Enlarged prostate.    URINARY BLADDER: Distended    ABDOMINAL WALL/INGUINAL REGIONS: Unremarkable.    BONES: No acute fracture or suspicious osseous lesion.   Impression:       Bilateral hydroureteronephrosis with bladder distention suggesting bladder outlet obstruction        Outpatient follow up Requested:  PCP  Urology    Complications:  None    Reason for Admission: Hydronephrosis    HPI:  Melchor Adrian is a 74 y.o. male with a PMH of hypertension, hyperlipidemia, PTSD, hypothyroid, underwent a cholecystectomy a week  "ago who presents with lower abdominal pain.  He reports he developed this lower abdominal pain 48 hours ago and he felt like he was unable to void and then all of a sudden he had an episode of incontinence and then the inability to void return.  CT is scan abdomen and pelvis shows obstruction and bladder, which she was relief with Arauz catheter.  The patient has been admitted under observation for further management and treatment.     Hospital Course:     The patient presented with hydronephrosis due to bladder outlet obstruction.  The patient was decompressed with Arauz catheter significant improvement in hydronephrosis and kidney function.  Patient without any signs of infection actively currently, no leukocytosis, no fever, no suprapubic pain, I do not think that antibiotics anything needed based on current clinical scenario.  Urologist outpatient.  The patient is being discharged with Arauz catheter in place.  She will follow-up with primary care physician and urology.  Discharging in stable condition with Arauz.    Condition at Discharge: good     Discharge Day Visit / Exam:     Subjective:    The patient is doing very well and wishes to go home.  Denies any nausea vomiting.  Arauz catheter draining clear yellow urine  Vitals: Blood Pressure: 143/82 (11/24/24 0800)  Pulse: 85 (11/24/24 1200)  Temperature: 98.1 °F (36.7 °C) (11/24/24 0800)  Temp Source: Oral (11/23/24 2139)  Respirations: 18 (11/24/24 0945)  Height: 5' 7\" (170.2 cm) (11/23/24 1058)  Weight - Scale: 95.2 kg (209 lb 14.1 oz) (11/24/24 0600)  SpO2: 94 % (11/24/24 1200)    Exam:   Physical Exam  Vitals and nursing note reviewed.   Constitutional:       General: He is not in acute distress.     Appearance: Normal appearance. He is not ill-appearing, toxic-appearing or diaphoretic.      Comments: Male patient in bed, awake   HENT:      Head: Normocephalic and atraumatic.      Right Ear: External ear normal.      Left Ear: External ear normal.      " Nose: Nose normal. No congestion or rhinorrhea.      Mouth/Throat:      Mouth: Mucous membranes are moist.      Pharynx: Oropharynx is clear. No oropharyngeal exudate or posterior oropharyngeal erythema.   Eyes:      General: No scleral icterus.        Right eye: No discharge.         Left eye: No discharge.      Pupils: Pupils are equal, round, and reactive to light.   Neck:      Vascular: No carotid bruit.   Cardiovascular:      Rate and Rhythm: Normal rate and regular rhythm.      Pulses: Normal pulses.      Heart sounds: No murmur heard.     No friction rub. No gallop.   Pulmonary:      Effort: Pulmonary effort is normal. No respiratory distress.      Breath sounds: Normal breath sounds. No stridor. No wheezing, rhonchi or rales.   Abdominal:      General: Abdomen is flat. Bowel sounds are normal. There is no distension.      Palpations: Abdomen is soft. There is no mass.      Tenderness: There is no abdominal tenderness. There is no guarding or rebound.      Hernia: No hernia is present.   Genitourinary:     Comments: Arauz in place draining clear yellow urine  Musculoskeletal:         General: No swelling, tenderness, deformity or signs of injury. Normal range of motion.      Cervical back: Normal range of motion. No rigidity. No muscular tenderness.   Lymphadenopathy:      Cervical: No cervical adenopathy.   Skin:     General: Skin is warm and dry.      Capillary Refill: Capillary refill takes less than 2 seconds.      Coloration: Skin is not jaundiced or pale.      Findings: No bruising or erythema.   Neurological:      General: No focal deficit present.      Mental Status: He is alert and oriented to person, place, and time. Mental status is at baseline.      Cranial Nerves: No cranial nerve deficit.      Sensory: No sensory deficit.      Motor: No weakness.      Coordination: Coordination normal.      Deep Tendon Reflexes: Reflexes normal.   Psychiatric:         Mood and Affect: Mood normal.          Behavior: Behavior normal.         Thought Content: Thought content normal.         Judgment: Judgment normal.         Discussion with Family: Patient did not ask me to talk to anyone in particular    Discharge instructions/Information to patient and family:   See after visit summary for information provided to patient and family.      Provisions for Follow-Up Care:  See after visit summary for information related to follow-up care and any pertinent home health orders.      Disposition:     Home with VNA Services (Reminder: Complete face to face encounter)    For Discharges to [facility]:   Not Applicable to this Patient - Not Applicable to this Patient    Planned Readmission: No     Discharge Statement:  I spent 86 minutes discharging the patient. This time was spent on the day of discharge. I had direct contact with the patient on the day of discharge. Greater than 50% of the total time was spent examining patient, answering all patient questions, arranging and discussing plan of care with patient as well as directly providing post-discharge instructions.  Additional time then spent on discharge activities.    Discharge Medications:  See after visit summary for reconciled discharge medications provided to patient and family.      ** Please Note: This note has been constructed using a voice recognition system **

## 2024-11-27 LAB
BACTERIA BLD CULT: NORMAL
BACTERIA BLD CULT: NORMAL

## 2024-12-03 ENCOUNTER — PROCEDURE VISIT (OUTPATIENT)
Dept: UROLOGY | Facility: CLINIC | Age: 74
End: 2024-12-03
Payer: COMMERCIAL

## 2024-12-03 VITALS
TEMPERATURE: 96.8 F | HEIGHT: 67 IN | DIASTOLIC BLOOD PRESSURE: 82 MMHG | WEIGHT: 207 LBS | BODY MASS INDEX: 32.49 KG/M2 | OXYGEN SATURATION: 98 % | HEART RATE: 113 BPM | SYSTOLIC BLOOD PRESSURE: 132 MMHG

## 2024-12-03 DIAGNOSIS — R33.9 URINARY RETENTION: Primary | ICD-10-CM

## 2024-12-03 PROCEDURE — 51702 INSERT TEMP BLADDER CATH: CPT

## 2024-12-03 NOTE — PROGRESS NOTES
"12/3/2024    Melchor Adrian  1950  [id number]    Diagnosis      Patient presents for TOV managed by our office    Plan  Plan to follow up with next TOV as scheduled     Procedure Arauz removal/voiding trial    Arauz catheter removed after deflation of an intact balloon. Patient tolerated well. Encouraged patient to hydrate well and return this afternoon for post void residual.  he knows he may return early if uncomfortable and unable to urinate. Patient agrees to this plan.    Patient returned this afternoon. Patient states unable to void.  Bladder ultrasound performed and PVR measured 777ml.    Recent Results (from the past 4 hours)   POCT Measure PVR    Collection Time: 12/03/24  2:13 PM   Result Value Ref Range    POST-VOID RESIDUAL VOLUME, ML  mL         Vitals:    12/03/24 0802   BP: 132/82   Pulse: (!) 113   Temp: (!) 96.8 °F (36 °C)   SpO2: 98%   Weight: 93.9 kg (207 lb)   Height: 5' 7\" (1.702 m)     Universal Protocol:  procedure performed by consultantConsent: Verbal consent obtained.  Risks and benefits: risks, benefits and alternatives were discussed  Consent given by: patient  Patient understanding: patient states understanding of the procedure being performed  Patient consent: the patient's understanding of the procedure matches consent given  Procedure consent: procedure consent matches procedure scheduled    Bladder catheterization    Date/Time: 12/3/2024 8:30 AM    Performed by: Saray Ledezma RN  Authorized by: Dwight Sanchez DO    Patient location:  Bedside  Consent:     Consent given by:  Patient  Universal protocol:     Procedure explained and questions answered to patient or proxy's satisfaction: yes    Pre-procedure details:     Procedure purpose:  Therapeutic  Procedure details:     Catheter insertion:  Indwelling    Catheter type:  Coude and Arauz    Catheter size:  16 Fr    Number of attempts:  1    Successful placement: yes      Urine characteristics:  " Yellow  Post-procedure details:     Patient tolerance of procedure:  Tolerated well, no immediate complications  Comments:      Site prepped with betadine. New gomez inserted without issue for 780mL yellow urine return. 10ml sterile water inflated into balloon. Attached to leg bag.          Saray Ledezma RN

## 2024-12-04 ENCOUNTER — OFFICE VISIT (OUTPATIENT)
Dept: SURGERY | Facility: CLINIC | Age: 74
End: 2024-12-04

## 2024-12-04 VITALS
HEIGHT: 67 IN | DIASTOLIC BLOOD PRESSURE: 78 MMHG | BODY MASS INDEX: 32.36 KG/M2 | HEART RATE: 83 BPM | SYSTOLIC BLOOD PRESSURE: 126 MMHG | OXYGEN SATURATION: 97 % | WEIGHT: 206.2 LBS | RESPIRATION RATE: 18 BRPM | TEMPERATURE: 97.9 F

## 2024-12-04 DIAGNOSIS — K82.A1 CHOLECYSTITIS WITH GANGRENE OF GALLBLADDER: Primary | ICD-10-CM

## 2024-12-04 PROCEDURE — 99024 POSTOP FOLLOW-UP VISIT: CPT | Performed by: STUDENT IN AN ORGANIZED HEALTH CARE EDUCATION/TRAINING PROGRAM

## 2024-12-04 NOTE — ASSESSMENT & PLAN NOTE
74-year-old male status post laparoscopic cholecystectomy on 11/14/2024  -Patient is tolerating a diet and having bowel function  -He denies any abdominal pain  -Laparoscopic incisions healing well without erythema induration or drainage  -Pathology report reviewed  -Recommend no heavy lifting greater than 15 to 20 pounds for total of 4 weeks after surgery  -Follow-up in office as needed    Final Diagnosis   A. Gall Bladder, Laparoscopic Cholecystectomy:  - Acute calculous cholecystitis with focal gangrenous changes.     I reviewed the pathology report and discussed the findings with the patient and their accompanying family or caregiver, if present. All questions were answered to satisfaction and the patient along with family or caregiver, if present, voiced understanding.

## 2024-12-04 NOTE — PROGRESS NOTES
Name: Melchor Adrian      : 1950      MRN: [id number]  Encounter Provider: Law Lew DO  Encounter Date: 2024   Encounter department: [facility]  :  Assessment & Plan  Cholecystitis with gangrene of gallbladder  74-year-old male status post laparoscopic cholecystectomy on 2024  -Patient is tolerating a diet and having bowel function  -He denies any abdominal pain  -Laparoscopic incisions healing well without erythema induration or drainage  -Pathology report reviewed  -Recommend no heavy lifting greater than 15 to 20 pounds for total of 4 weeks after surgery  -Follow-up in office as needed    Final Diagnosis   A. Gall Bladder, Laparoscopic Cholecystectomy:  - Acute calculous cholecystitis with focal gangrenous changes.     I reviewed the pathology report and discussed the findings with the patient and their accompanying family or caregiver, if present. All questions were answered to satisfaction and the patient along with family or caregiver, if present, voiced understanding.             History of Present Illness     HPI  Melchor Adrian is a 74 y.o. male who presents for evaluation status post laparoscopic cholecystectomy.  He is tolerating a diet and having bowel function.  He denies any abdominal pain.  He has completed his antibiotics.  History obtained from: patient    Review of Systems   Constitutional:  Negative for chills, fatigue and fever.   HENT:  Negative for congestion, hearing loss, rhinorrhea and sore throat.    Eyes:  Negative for pain and discharge.   Respiratory:  Negative for cough, chest tightness and shortness of breath.    Cardiovascular:  Negative for chest pain and palpitations.   Gastrointestinal:  Negative for abdominal pain, constipation, diarrhea, nausea and vomiting.   Endocrine: Negative for cold intolerance and heat intolerance.   Musculoskeletal:  Negative for back pain and neck pain.   Skin:  Negative for color change and rash.    Allergic/Immunologic: Negative for environmental allergies and food allergies.   Neurological:  Negative for seizures and headaches.   Hematological:  Does not bruise/bleed easily.   Psychiatric/Behavioral:  Negative for confusion and hallucinations.      Medical History Reviewed by provider this encounter:  Tobacco  Allergies  Meds  Problems  Med Hx  Surg Hx  Fam Hx     .  Past Medical History   Past Medical History:   Diagnosis Date    Basal cell carcinoma     Left Upper back    GERD (gastroesophageal reflux disease)     Hyperlipidemia     Hypertension     PTSD (post-traumatic stress disorder)     SIRS (systemic inflammatory response syndrome) (HCC) 11/22/2024    Urinary retention 11/22/2024     Past Surgical History:   Procedure Laterality Date    CHOLECYSTECTOMY LAPAROSCOPIC N/A 11/14/2024    Procedure: CHOLECYSTECTOMY LAPAROSCOPIC;  Surgeon: Law Lew DO;  Location: [facility];  Service: General    COLONOSCOPY      KNEE SURGERY      MASS EXCISION N/A 5/27/2020    Procedure: EXCISION BIOPSY TISSUE LESION/MASS RIGHT BUTTOCK;  Surgeon: Tez Erickson MD;  Location: [facility];  Service: General     Family History   Problem Relation Age of Onset    Heart disease Mother     Heart disease Father       reports that he has quit smoking. He has never used smokeless tobacco. He reports that he does not currently use alcohol. He reports that he does not use drugs.  Current Outpatient Medications on File Prior to Visit   Medication Sig Dispense Refill    amLODIPine-benazepril (LOTREL 2.5-10) 2.5-10 MG per capsule Take 1 capsule by mouth daily      finasteride (PROSCAR) 5 mg tablet Take 1 tablet (5 mg total) by mouth daily 30 tablet 0    FLUoxetine (PROzac) 20 mg/5 mL solution Take by mouth daily      metroNIDAZOLE (METROGEL) 0.75 % gel Apply topically 2 (two) times a day 45 g 2    omeprazole (PriLOSEC) 40 MG capsule Take 40 mg by mouth daily      rosuvastatin (CRESTOR) 10 MG tablet Take 5 mg by mouth  daily      simethicone (MYLICON) 80 mg chewable tablet Chew 80 mg every 6 (six) hours as needed for flatulence      tamsulosin (FLOMAX) 0.4 mg Take 1 capsule (0.4 mg total) by mouth daily with dinner 30 capsule 0    clindamycin (CLEOCIN T) 1 % external solution Apply topically 2 (two) times a day To face. DEB Hinojosa 1950, # [id number] (Patient not taking: Reported on 2024) 30 mL 2    triamcinolone (KENALOG) 0.1 % cream Apply to affected spots twice a day for 2 weeks on the 1 week off. (Patient not taking: Reported on 2024) 80 g 1     No current facility-administered medications on file prior to visit.     Allergies   Allergen Reactions    Atorvastatin Other (See Comments)    Simvastatin Other (See Comments)      Current Outpatient Medications on File Prior to Visit   Medication Sig Dispense Refill    amLODIPine-benazepril (LOTREL 2.5-10) 2.5-10 MG per capsule Take 1 capsule by mouth daily      finasteride (PROSCAR) 5 mg tablet Take 1 tablet (5 mg total) by mouth daily 30 tablet 0    FLUoxetine (PROzac) 20 mg/5 mL solution Take by mouth daily      metroNIDAZOLE (METROGEL) 0.75 % gel Apply topically 2 (two) times a day 45 g 2    omeprazole (PriLOSEC) 40 MG capsule Take 40 mg by mouth daily      rosuvastatin (CRESTOR) 10 MG tablet Take 5 mg by mouth daily      simethicone (MYLICON) 80 mg chewable tablet Chew 80 mg every 6 (six) hours as needed for flatulence      tamsulosin (FLOMAX) 0.4 mg Take 1 capsule (0.4 mg total) by mouth daily with dinner 30 capsule 0    clindamycin (CLEOCIN T) 1 % external solution Apply topically 2 (two) times a day To face. DEB Hinojosa 1950, # [id number] (Patient not taking: Reported on 2024) 30 mL 2    triamcinolone (KENALOG) 0.1 % cream Apply to affected spots twice a day for 2 weeks on the 1 week off. (Patient not taking: Reported on 2024) 80 g 1     No current facility-administered medications on file prior to visit.      Social History     Tobacco Use     "Smoking status: Former    Smokeless tobacco: Never   Vaping Use    Vaping status: Never Used   Substance and Sexual Activity    Alcohol use: Not Currently    Drug use: Never    Sexual activity: Not on file        Objective   /78 (BP Location: Left arm, Patient Position: Sitting, Cuff Size: Standard)   Pulse 83   Temp 97.9 °F (36.6 °C)   Resp 18   Ht 5' 7\" (1.702 m)   Wt 93.5 kg (206 lb 3.2 oz)   SpO2 97%   BMI 32.30 kg/m²      Physical Exam  Constitutional:       Appearance: Normal appearance.   HENT:      Head: Normocephalic and atraumatic.      Nose: Nose normal.   Eyes:      General: No scleral icterus.     Conjunctiva/sclera: Conjunctivae normal.   Cardiovascular:      Rate and Rhythm: Normal rate.   Pulmonary:      Effort: Pulmonary effort is normal.   Abdominal:      General: There is no distension.      Palpations: Abdomen is soft.      Tenderness: There is no abdominal tenderness.      Comments: Laparoscopic incisions healing well without erythema induration or drainage   Musculoskeletal:         General: No signs of injury.   Skin:     General: Skin is warm.      Coloration: Skin is not jaundiced.   Neurological:      General: No focal deficit present.      Mental Status: He is alert and oriented to person, place, and time.   Psychiatric:         Mood and Affect: Mood normal.         Behavior: Behavior normal.           "

## 2024-12-18 ENCOUNTER — TELEPHONE (OUTPATIENT)
Dept: UROLOGY | Facility: CLINIC | Age: 74
End: 2024-12-18

## 2024-12-18 DIAGNOSIS — E87.1 HYPONATREMIA: ICD-10-CM

## 2024-12-18 DIAGNOSIS — R33.9 URINARY RETENTION: ICD-10-CM

## 2024-12-18 RX ORDER — FINASTERIDE 5 MG/1
5 TABLET, FILM COATED ORAL DAILY
Qty: 90 TABLET | Refills: 1 | Status: SHIPPED | OUTPATIENT
Start: 2024-12-18

## 2024-12-18 RX ORDER — TAMSULOSIN HYDROCHLORIDE 0.4 MG/1
0.4 CAPSULE ORAL
Qty: 90 CAPSULE | Refills: 1 | Status: SHIPPED | OUTPATIENT
Start: 2024-12-18

## 2024-12-23 ENCOUNTER — TELEPHONE (OUTPATIENT)
Dept: UROLOGY | Facility: CLINIC | Age: 74
End: 2024-12-23

## 2024-12-23 ENCOUNTER — PROCEDURE VISIT (OUTPATIENT)
Dept: UROLOGY | Facility: CLINIC | Age: 74
End: 2024-12-23
Payer: COMMERCIAL

## 2024-12-23 VITALS
HEART RATE: 104 BPM | OXYGEN SATURATION: 98 % | DIASTOLIC BLOOD PRESSURE: 86 MMHG | TEMPERATURE: 98.8 F | HEIGHT: 67 IN | BODY MASS INDEX: 33.74 KG/M2 | SYSTOLIC BLOOD PRESSURE: 144 MMHG | WEIGHT: 215 LBS

## 2024-12-23 DIAGNOSIS — R33.9 URINARY RETENTION: Primary | ICD-10-CM

## 2024-12-23 LAB — POST-VOID RESIDUAL VOLUME, ML POC: 454 ML

## 2024-12-23 PROCEDURE — 99024 POSTOP FOLLOW-UP VISIT: CPT

## 2024-12-23 PROCEDURE — 51702 INSERT TEMP BLADDER CATH: CPT

## 2024-12-23 PROCEDURE — 51798 US URINE CAPACITY MEASURE: CPT

## 2024-12-23 NOTE — PROGRESS NOTES
"12/23/2024    Melchor Adrian  1950  [id number]    Diagnosis  Chief Complaint    Follow-up         Patient presents for TOV #2 managed by our office    Plan  Patient is to have gomez cath removed this morning in office and then return this afternoon for Post Void Residual    Procedure Gomez removal/voiding trial    Gomez catheter removed after deflation of an intact balloon. Patient tolerated well. Encouraged patient to hydrate well and return this afternoon for post void residual. He knows he may return early if uncomfortable and unable to urinate. Patient agrees to this plan.    Patient returned this afternoon. Patient states unable to void.  Bladder ultrasound performed and PVR measured 454 ml.    Recent Results (from the past 4 hours)   POCT Measure PVR    Collection Time: 12/23/24  4:26 PM   Result Value Ref Range    POST-VOID RESIDUAL VOLUME, ML  mL           Vitals:    12/23/24 0843   BP: 144/86   Patient Position: Sitting   Cuff Size: Standard   Pulse: 104   Temp: 98.8 °F (37.1 °C)   TempSrc: Temporal   SpO2: 98%   Weight: 97.5 kg (215 lb)   Height: 5' 7\" (1.702 m)     Patient presented this morning to have gomez cath removed. 10 mL balloon deflated, upon removal gomez cath tip noted to be intact, no trauma noted. Patient denies any pain/discomfort. Patient educated on maintaining adequate hydration throughout the day, refraining from ingesting bladder irritants, avoid holding urine for extended periods of time, the process of double voiding, and urinating every 1-2 hours to encourage emptying the bladder. Patient verbalizes understanding at this time.        Patient returned this afternoon making c/o being unable to urine and experiencing s/s of urinary retention. Patient is agreeable with gomez cath reinsertion due elevated PVR.       Patient presents for gomez cath reinsertion  due to failing PVR managed by our office    Plan:  Patient is scheduled for repeat TOV 1/14/25 to which he is agreeable. " "  Patient is instructed to continue taking urinary medication as ordered.   Patient instructed to call with any questions or concerns in the meantime.    Orders Placed This Encounter   Procedures    POCT Measure PVR        Vitals:    12/23/24 0843   BP: 144/86   Patient Position: Sitting   Cuff Size: Standard   Pulse: 104   Temp: 98.8 °F (37.1 °C)   TempSrc: Temporal   SpO2: 98%   Weight: 97.5 kg (215 lb)   Height: 5' 7\" (1.702 m)           Procedure:    Universal Protocol:  procedure performed by consultantConsent: Verbal consent obtained.  Risks and benefits: risks, benefits and alternatives were discussed  Consent given by: patient  Patient understanding: patient states understanding of the procedure being performed  Patient consent: the patient's understanding of the procedure matches consent given  Procedure consent: procedure consent matches procedure scheduled  Patient identity confirmed: verbally with patient    Bladder catheterization    Date/Time: 12/23/2024 9:00 AM    Performed by: Norma Dumont LPN  Authorized by: Dwight Sanchez DO    Patient location:  Bedside  Consent:     Consent given by:  Patient  Universal protocol:     Procedure explained and questions answered to patient or proxy's satisfaction: yes      Patient identity confirmed:  Verbally with patient  Pre-procedure details:     Procedure purpose:  Therapeutic    Preparation: Patient was prepped and draped in usual sterile fashion    Anesthesia (see MAR for exact dosages):     Anesthesia method:  None  Procedure details:     Bladder irrigation: no      Catheter insertion:  Indwelling    Approach: natural orifice      Catheter type:  Coude, latex and Gomez    Catheter size:  18 Fr    Number of attempts:  1    Successful placement: yes      Urine characteristics:  Clear and yellow  Comments:      Patient presented for gomez cath insertion after failing PVR. 18 Fr latex gomez cath inserted using sterile technique, 10 ml balloon inflated, " clear yellow urine return of 600 cc noted. Patient denies any pain/discomfort, no trauma noted to the site. Arauz cath tubing secured to right leg via stat lock, patient tolerated procedure well. Patient is educated on maintaining adequate hydration with water to prevent cath clogging and subsequent UTI's. Patient is educated on constipation, treatment, and prevention to allow for efficient urinary flow. Patient is educated on the importance of performing daily cath care to encourage cleanliness. Patient has been scheduled for another TOV. Patient verbalizes full understanding to teachings.          Norma Dumont LPN

## 2025-01-08 ENCOUNTER — TELEPHONE (OUTPATIENT)
Age: 75
End: 2025-01-08

## 2025-01-08 NOTE — TELEPHONE ENCOUNTER
Should be taken at dinner due to possible side effect of dizziness. Can trial earlier if they would like, monitor for this side effect

## 2025-01-08 NOTE — TELEPHONE ENCOUNTER
Patient returning call, patient states he has been taking it around lunch time. He has not had any dizziness.

## 2025-01-08 NOTE — TELEPHONE ENCOUNTER
Called and left VM for the PT or the PT Nurse to call the Office back. Office number for the PT.    If PT calls please let the PT or the PT's Nurse know Marina sandoval.

## 2025-01-08 NOTE — TELEPHONE ENCOUNTER
Patients nurse, Dhruv from [facility], called asking if the tamsulosin can be taken during the morning instead of in the evening as the patient is experiencing frequency throughout the night more so than during the day. Please advise.

## 2025-01-13 ENCOUNTER — PROCEDURE VISIT (OUTPATIENT)
Dept: UROLOGY | Facility: CLINIC | Age: 75
End: 2025-01-13
Payer: COMMERCIAL

## 2025-01-13 ENCOUNTER — HOSPITAL ENCOUNTER (EMERGENCY)
Facility: HOSPITAL | Age: 75
Discharge: HOME | End: 2025-01-14
Attending: EMERGENCY MEDICINE
Payer: COMMERCIAL

## 2025-01-13 VITALS
TEMPERATURE: 98.8 F | OXYGEN SATURATION: 97 % | WEIGHT: 217.59 LBS | DIASTOLIC BLOOD PRESSURE: 101 MMHG | RESPIRATION RATE: 19 BRPM | HEART RATE: 120 BPM | BODY MASS INDEX: 34.08 KG/M2 | SYSTOLIC BLOOD PRESSURE: 194 MMHG

## 2025-01-13 VITALS
HEIGHT: 67 IN | WEIGHT: 215.8 LBS | RESPIRATION RATE: 18 BRPM | DIASTOLIC BLOOD PRESSURE: 82 MMHG | BODY MASS INDEX: 33.87 KG/M2 | OXYGEN SATURATION: 96 % | HEART RATE: 89 BPM | TEMPERATURE: 97.4 F | SYSTOLIC BLOOD PRESSURE: 138 MMHG

## 2025-01-13 DIAGNOSIS — R33.9 URINARY RETENTION: Primary | ICD-10-CM

## 2025-01-13 LAB — POST-VOID RESIDUAL VOLUME, ML POC: 52 ML

## 2025-01-13 PROCEDURE — 87077 CULTURE AEROBIC IDENTIFY: CPT | Performed by: UROLOGY

## 2025-01-13 PROCEDURE — 99283 EMERGENCY DEPT VISIT LOW MDM: CPT

## 2025-01-13 PROCEDURE — 99283 EMERGENCY DEPT VISIT LOW MDM: CPT | Performed by: EMERGENCY MEDICINE

## 2025-01-13 PROCEDURE — 52000 CYSTOURETHROSCOPY: CPT | Performed by: UROLOGY

## 2025-01-13 PROCEDURE — 51798 US URINE CAPACITY MEASURE: CPT | Performed by: UROLOGY

## 2025-01-13 PROCEDURE — 87186 SC STD MICRODIL/AGAR DIL: CPT | Performed by: UROLOGY

## 2025-01-13 PROCEDURE — 87086 URINE CULTURE/COLONY COUNT: CPT | Performed by: UROLOGY

## 2025-01-13 RX ORDER — LEVOTHYROXINE SODIUM 25 UG/1
37.5 TABLET ORAL
COMMUNITY
Start: 2024-12-31

## 2025-01-13 RX ORDER — SULFAMETHOXAZOLE AND TRIMETHOPRIM 800; 160 MG/1; MG/1
1 TABLET ORAL EVERY 12 HOURS SCHEDULED
Qty: 6 TABLET | Refills: 0 | Status: SHIPPED | OUTPATIENT
Start: 2025-01-13 | End: 2025-01-16

## 2025-01-13 NOTE — PATIENT INSTRUCTIONS
You had cystoscopy done in the office today. This means that we looked inside your urethra and bladder with a camera.    You may see some blood in your urine for the next few days. This is normal. Please drink plenty of fluids. Call the office if you are passing large blood clots in your urine or if you are not able to urinate.    It may burn when you urinate for the next few days. This is normal.    Please call the office if you have fevers or chills in the next few days.    An antibiotic called Bactrim was sent to your pharmacy. Please pick this up and take medication as prescribed.    You will return to clinic this afternoon to visit with the nurse

## 2025-01-13 NOTE — PROGRESS NOTES
"74-year-old male with urinary retention    Denied cardiac issues    No anticoagulation    Had neg GH campbell around 2019 with outside urologist    Presented to ED on 11/14/2025 with urinary retention.  Arauz catheter was placed for 1100 cc.  Was started on Flomax and finasteride during admission.  Urine was not infected.    CTAP w 11/21/24: prostate measured at 4.52 x 4.82 x 6.35 cm, 71.94 g    Failed trial of void multiple times in December 2024             Cystoscopy     Date/Time  1/13/2025 10:30 AM     Performed by  Dwight Sanchez DO   Authorized by  Dwight Sanchez DO     Universal Protocol:  procedure performed by consultantConsent: Verbal consent obtained. Written consent obtained.  Risks and benefits: risks, benefits and alternatives were discussed  Consent given by: patient  Time out: Immediately prior to procedure a \"time out\" was called to verify the correct patient, procedure, equipment, support staff and site/side marked as required.  Timeout called at: 1/13/2025 11:22 AM.  Patient understanding: patient states understanding of the procedure being performed  Patient consent: the patient's understanding of the procedure matches consent given  Procedure consent: procedure consent matches procedure scheduled  Relevant documents: relevant documents present and verified  Required items: required blood products, implants, devices, and special equipment available  Patient identity confirmed: verbally with patient      Procedure Details:  Procedure type: cystoscopy    Patient tolerance: Patient tolerated the procedure well with no immediate complications    Additional Procedure Details: Office Cystoscopy Procedure Note    Indication:     urinary retention     Informed consent   The risks, benefits, complications, treatment options, and expected outcomes were discussed with the patient. The patient concurred with the proposed plan and provided informed consent.    Anesthesia  Lidocaine jelly " 2%    Antibiotic prophylaxis   None    Procedure  The patient was placed in the supineposition, was prepped and draped in the usual manner using sterile technique, and 2% lidocaine jelly instilled into the urethra.  A 17 F flexible cystoscope was then inserted into the urethra and the urethra and bladder carefully examined.  The following findings were noted:    Findings:  Urethra:  Normal  Prostate:  Bilateral lateral lobe hypertrophy with kissing lobes, mild to moderate median lobe with some intravesical component, no lesions  Bladder:  Severe trabeculations; catheter cystitis changes throughout the bladder; no stones  Ureteral orifices:  orthotopic  Other findings:  None, retroflexed view confirms    Specimens: None                 Complications:    None; patient tolerated the procedure well           Disposition: To home            Condition: Stable                  We did talk about the possibility of him not passing the trial of void today.  This is the case, he understands that he has the option of chronic urethral catheter exchanges versus suprapubic tube versus surgical intervention to try to be without a catheter.  He understands that he may opt for urodynamic testing prior to surgical intervention to see if he has proper detrusor function before surgery.  He also understands that he may forego the urodynamic testing before surgery with the understanding that he still may not be able to urinate after surgery.    He understands that based on the size of his prostate and the fact that he would like strictly a scope procedure, he would best be served by holmium laser nucleation of the prostate.    Patient is interested in proceeding with Holmium laser enucleation based on our discussion today.      Laser enucleation of the prostate offers a completely endoscopic approach (no incisions) to definitively remove prostate tissue obstructing the bladder with lower risk of bleeding complications compared to other  endoscopic procedures, which is why I commonly offer Laser enucleation of the prostate.  Additional advantages of laser enucleation of the prostate include, very low risk of re-treatment over time, shorter hospital stay and in some cases no hospital stay required, shorter indwelling Arauz catheter times, and the safety of this treatment modality in complex patients on blood thinning medication (ex. Eliquis, Xarelto, Coumadin).      I explained a HoLEP is a surgery performed with special equipment through the urethra to remove obstructing prostate tissue that is causing urinary symptoms.  Usually a Arauz catheter will be placed for less than 24 hours after surgery. Some patients may require hospitalization overnight with continuous bladder irrigation through the Arauz catheter.  It is common to have irritative voiding symptoms such as urinary urgency/frequency/nocturia for several weeks following a HoLEP; it is also common to see blood in the urine during this time period. There is a risk of retrograde ejaculation in up to 75% of patients after HoLEP. The risk of erectile dysfunction is rare following a HoLEP. The risk of transient stress urinary incontinence lasting 3-6 months following surgery is between 10-30% based on the literature.  The risk of long term urinary incontinence is rare, 1-2%.  Additional side effects that we discussed today include urethral stricture and bladder neck contracture, which occurs in 1-2% of patients. The risk of needing to make a cystotomy or a bladder incision to retrieve the enucleated tissue in the event that the morcellator does not work was discussed. Other reasons why a cystotomy would have to be made include bladder injury from irrigation fluid used during the procedure or bladder injury from placement of a Arauz catheter.     I also explained to the patient that proper voiding requires open bladder outlet as well as proper underlying bladder function. He understands that even  if we create a nice opening in the prostate after surgery, he still may not ideally void due to poor bladder function. He does understand that sometimes underlying bladder voiding mechanics can improve once a proper outlet opening is created, however, he also understands that this is not always the case. In summary the patient understands that even despite a proper HoLEP surgery, voiding issues may remain after the procedure.    We also discussed the possibility of finding prostate cancer pathology in the HoLEP specimen. I explained that all of the prostate tissue removed during the procedure gets examined by the pathology team, and there have been instances where prostate cancer is identified. I explained that while incidentally found prostate cancer is rare, it is usually low risk prostate cancer that can typically be monitored rather than aggressively treated. Should the patient have intermediate or high risk prostate cancer identified, he would have the risk based treatment options reviewed with him. I explained that in these scenarios, men are often treated with radiation.    The patient understands the risk and benefits of a HoLEP and would like to proceed with surgery.      I took time to answer any questions he had regarding the procedure.              PLAN  -3 days of Bactrim sent for prophylaxis. Fu Ucx. Adjust abx if need be.  -Will come back this PM for TOV with RN  -AP visit in about 2-3 weeks.  At that time, if he still has a Arauz catheter in place because he failed a trial of void today, he will have the option of chronic Arauz catheter exchanges, suprapubic tube, surgical intervention (HoLEP) with or without urodynamics beforehand.  If he does not have a catheter at that time, he will get PVR and he will reassess his symptoms and see whether or not he would like to have surgery in the future.  If he does want to pursue surgery, case can be placed for holmium laser nucleation of the prostate at that  visit.

## 2025-01-13 NOTE — PROGRESS NOTES
"1/13/2025    Melchor Adrian  1950  [id number]    Diagnosis  Chief Complaint    Follow-up         Patient presents for TOV  managed by Dr. Sanchez    Plan  Patient will have gomez cath removed by Dr. Sanchez   Patient will have simple Cysto in office today and return this afternoon for Post Void Residual.     Procedure Gomez removal/voiding trial    Gomez catheter removed by Dr. Sanchez this morning.    Patient returned this afternoon. Patient states able to void. Bladder ultrasound performed and PVR measured 52 ml.    Recent Results (from the past 4 hours)   POCT Measure PVR    Collection Time: 01/13/25  2:31 PM   Result Value Ref Range    POST-VOID RESIDUAL VOLUME, ML POC 52 mL           Vitals:    01/13/25 1021   BP: 138/82   BP Location: Left arm   Patient Position: Sitting   Cuff Size: Standard   Pulse: 89   Resp: 18   Temp: (!) 97.4 °F (36.3 °C)   TempSrc: Temporal   SpO2: 96%   Weight: 97.9 kg (215 lb 12.8 oz)   Height: 5' 7\" (1.702 m)   Patient returned this afternoon stated he maintained adequate hydration and urinated throughout the day. Reports no complications with urination. Patient educated on maintaining hydration and immediately reporting s/s of urinary retention or difficulty. Patient verbalizes full understanding to teaching.   Patient is scheduled for a follow-up appointment with Guera BETANCOURT on 2/3/25 to which patient is agreeable.        Norma Dumont LPN        "

## 2025-01-14 ENCOUNTER — TELEPHONE (OUTPATIENT)
Age: 75
End: 2025-01-14

## 2025-01-14 ENCOUNTER — PREP FOR PROCEDURE (OUTPATIENT)
Dept: UROLOGY | Facility: CLINIC | Age: 75
End: 2025-01-14

## 2025-01-14 DIAGNOSIS — N13.8 BPH WITH OBSTRUCTION/LOWER URINARY TRACT SYMPTOMS: Primary | ICD-10-CM

## 2025-01-14 DIAGNOSIS — N40.1 BPH WITH OBSTRUCTION/LOWER URINARY TRACT SYMPTOMS: Primary | ICD-10-CM

## 2025-01-14 NOTE — TELEPHONE ENCOUNTER
Patient is tentatively scheduled for gomez cath change 2/13/25 9:30 am. This nursed attempted to call patient to make him aware of appointment and the rationale from Dr. Sanchez. Detailed voicemail left providing office phone number and instructing patient to call back if he has any further questions.

## 2025-01-14 NOTE — TELEPHONE ENCOUNTER
Pt calling due to he was seen in office yesterday for cysto and had gomez catheter removed. Pt sts he was unable to urinate in the evening and had to return to the ED to have the gomez reinserted.     Pt discussed surgical options with provider yesterday at [facility] and sts he would like to move forward with laser enucleation of the prostate. Pt is requesting a call back from clinical to discuss next steps. Please review.    Pt call back- [phone] (pt prefers calls after 2pm)

## 2025-01-14 NOTE — ED PROVIDER NOTES
Time reflects when diagnosis was documented in both MDM as applicable and the Disposition within this note       Time User Action Codes Description Comment    1/13/2025 11:51 PM Augustin Lr Add [R33.9] Urinary retention           ED Disposition       ED Disposition   Discharge    Condition   Stable    Date/Time   Mon Jan 13, 2025 11:51 PM    Comment   Melchor Adrian discharge to home/self care.                   Assessment & Plan       Medical Decision Making  Arauz introduced. Almost 1L out     Problems Addressed:  Urinary retention: chronic illness or injury with exacerbation, progression, or side effects of treatment     Details: Acute repeat obstruction of urine bladder   Hx of same  Has urologist              Medications - No data to display    ED Risk Strat Scores                                              History of Present Illness       Chief Complaint   Patient presents with    Urinary Retention     Pt arrives c/o inability to urinate. Pt had urinary catheter taken out this morning by urology. Pt reports abdominal pain and pressure.         Past Medical History:   Diagnosis Date    Basal cell carcinoma     Left Upper back    GERD (gastroesophageal reflux disease)     Hyperlipidemia     Hypertension     PTSD (post-traumatic stress disorder)     SIRS (systemic inflammatory response syndrome) (HCC) 11/22/2024    Urinary retention 11/22/2024      Past Surgical History:   Procedure Laterality Date    CHOLECYSTECTOMY LAPAROSCOPIC N/A 11/14/2024    Procedure: CHOLECYSTECTOMY LAPAROSCOPIC;  Surgeon: Law Lew DO;  Location: [facility];  Service: General    COLONOSCOPY      KNEE SURGERY      MASS EXCISION N/A 5/27/2020    Procedure: EXCISION BIOPSY TISSUE LESION/MASS RIGHT BUTTOCK;  Surgeon: Tez Erickson MD;  Location: [facility];  Service: General      Family History   Problem Relation Age of Onset    Heart disease Mother     Heart disease Father       Social History     Tobacco Use    Smoking  status: Former     Passive exposure: Past    Smokeless tobacco: Never   Vaping Use    Vaping status: Never Used   Substance Use Topics    Alcohol use: Not Currently    Drug use: Never      E-Cigarette/Vaping    E-Cigarette Use Never User       E-Cigarette/Vaping Substances    Nicotine No     THC No     CBD No     Flavoring No     Other No     Unknown No       I have reviewed and agree with the history as documented.     Melchor Adrian is a 74 y.o.  year old male  Past Medical History:  No date: Basal cell carcinoma      Comment:  Left Upper back  No date: GERD (gastroesophageal reflux disease)  No date: Hyperlipidemia  No date: Hypertension  No date: PTSD (post-traumatic stress disorder)  11/22/2024: SIRS (systemic inflammatory response syndrome) ([facility])  11/22/2024: Urinary retention  Social History    Tobacco Use      Smoking status: Former        Passive exposure: Past      Smokeless tobacco: Never    Vaping Use      Vaping status: Never Used    Alcohol use: Not Currently    Drug use: Never    Patient presents with:  Urinary Retention: Pt arrives c/o inability to urinate. Pt had urinary catheter taken out this morning by urology. Pt reports abdominal pain and pressure.                      History provided by:  Patient   used: No        Review of Systems   Constitutional:  Negative for chills and fever.   HENT:  Negative for ear pain and sore throat.    Eyes:  Negative for pain and visual disturbance.   Respiratory:  Negative for cough and shortness of breath.    Cardiovascular:  Negative for chest pain and palpitations.   Gastrointestinal:  Positive for abdominal pain. Negative for vomiting.   Genitourinary:  Positive for difficulty urinating. Negative for dysuria and hematuria.   Musculoskeletal:  Negative for arthralgias and back pain.   Skin:  Negative for color change and rash.   Neurological:  Negative for seizures and syncope.   All other systems reviewed and are  negative.          Objective       ED Triage Vitals [01/13/25 2224]   Temperature Pulse Blood Pressure Respirations SpO2 Patient Position - Orthostatic VS   98.8 °F (37.1 °C) (!) 120 (!) 194/101 19 97 % Sitting      Temp Source Heart Rate Source BP Location FiO2 (%) Pain Score    Temporal Monitor Left arm -- --      Vitals      Date and Time Temp Pulse SpO2 Resp BP Pain Score FACES Pain Rating User   01/13/25 2224 98.8 °F (37.1 °C) 120 97 % 19 194/101 -- -- JM            Physical Exam  Vitals and nursing note reviewed.   Constitutional:       General: He is not in acute distress.     Appearance: Normal appearance. He is well-developed. He is obese.   HENT:      Head: Normocephalic and atraumatic.   Eyes:      Conjunctiva/sclera: Conjunctivae normal.   Cardiovascular:      Rate and Rhythm: Normal rate.      Pulses: Normal pulses.      Heart sounds: No murmur heard.  Pulmonary:      Effort: Pulmonary effort is normal. No respiratory distress.      Breath sounds: Normal breath sounds.   Abdominal:      General: There is distension (bladder).      Palpations: Abdomen is soft.      Tenderness: There is abdominal tenderness.   Musculoskeletal:         General: No swelling.      Cervical back: Neck supple.   Skin:     General: Skin is warm and dry.      Capillary Refill: Capillary refill takes less than 2 seconds.   Neurological:      General: No focal deficit present.      Mental Status: He is alert and oriented to person, place, and time.   Psychiatric:         Mood and Affect: Mood normal.         Thought Content: Thought content normal.         Results Reviewed       None            No orders to display       Procedures    ED Medication and Procedure Management   Prior to Admission Medications   Prescriptions Last Dose Informant Patient Reported? Taking?   FLUoxetine (PROzac) 20 mg/5 mL solution  Self Yes No   Sig: Take by mouth daily   amLODIPine-benazepril (LOTREL 2.5-10) 2.5-10 MG per capsule  Self Yes No   Sig:  Take 1 capsule by mouth daily   clindamycin (CLEOCIN T) 1 % external solution  Self No No   Sig: Apply topically 2 (two) times a day To face. DEB Hinojosa 1950, # [id number]   Patient not taking: Reported on 2024   finasteride (PROSCAR) 5 mg tablet  Self No No   Sig: Take 1 tablet (5 mg total) by mouth daily   levothyroxine (Synthroid) 25 mcg tablet  Self Yes No   Sig: Take 37.5 mcg by mouth   metroNIDAZOLE (METROGEL) 0.75 % gel  Self No No   Sig: Apply topically 2 (two) times a day   omeprazole (PriLOSEC) 40 MG capsule  Self Yes No   Sig: Take 40 mg by mouth daily   rosuvastatin (CRESTOR) 10 MG tablet  Self Yes No   Sig: Take 5 mg by mouth daily   simethicone (MYLICON) 80 mg chewable tablet  Self Yes No   Sig: Chew 80 mg every 6 (six) hours as needed for flatulence   sulfamethoxazole-trimethoprim (BACTRIM DS) 800-160 mg per tablet   No No   Sig: Take 1 tablet by mouth every 12 (twelve) hours for 3 days   tamsulosin (FLOMAX) 0.4 mg  Self No No   Sig: Take 1 capsule (0.4 mg total) by mouth daily with dinner   triamcinolone (KENALOG) 0.1 % cream  Self No No   Sig: Apply to affected spots twice a day for 2 weeks on the 1 week off.      Facility-Administered Medications: None     Patient's Medications   Discharge Prescriptions    No medications on file     No discharge procedures on file.  ED SEPSIS DOCUMENTATION   Time reflects when diagnosis was documented in both MDM as applicable and the Disposition within this note       Time User Action Codes Description Comment    2025 11:51 PM Augustin Lr Add [R33.9] Urinary retention                  Augustin Lr MD  [phone]

## 2025-01-14 NOTE — DISCHARGE INSTRUCTIONS
Follow up with urology as discussed    A  personal message from Dr. Augustin Lr,  Thank you so much for allowing me to care for you today.    I pride myself in the care and attention I give all my patients.  I hope you were a witness to this tonight.   If for any reason your condition does not improve or worsens, or you have a question that was not answered during your visit you can feel free to text me on my personal phone #  # [phone].   I will answer to your message and continue your care past your emergency room visit.     Please understand that although you are being discharged because your condition has been deemed stable and able to be managed on an outpatient setting. However your condition may worsen as part of the natural progression of the illness/condition, if this occurs please come back to the emergency department for a repeat evaluation.

## 2025-01-15 ENCOUNTER — PREP FOR PROCEDURE (OUTPATIENT)
Dept: UROLOGY | Facility: CLINIC | Age: 75
End: 2025-01-15

## 2025-01-15 LAB
BACTERIA UR CULT: ABNORMAL

## 2025-01-15 NOTE — TELEPHONE ENCOUNTER
Patient returned call from surgery scheduler.    Pt was informed of 4/1 surgery date at [facility] with Dr Sanchez.    Pt asks for a returned call from  to ask some questions. Pt is aware a msg is being sent to  and will be available either today or tomorrow for a returned call.     Call back [phone]

## 2025-01-15 NOTE — TELEPHONE ENCOUNTER
Called patient l/m to call back and confirm surgical date of 4/1/25 at [facility] with

## 2025-01-16 ENCOUNTER — PREP FOR PROCEDURE (OUTPATIENT)
Dept: UROLOGY | Facility: CLINIC | Age: 75
End: 2025-01-16

## 2025-01-16 DIAGNOSIS — R33.9 URINARY RETENTION: ICD-10-CM

## 2025-01-16 DIAGNOSIS — Z01.812 PRE-OPERATIVE LABORATORY EXAMINATION: Primary | ICD-10-CM

## 2025-01-16 DIAGNOSIS — E13.69 OTHER SPECIFIED DIABETES MELLITUS WITH OTHER SPECIFIED COMPLICATION, UNSPECIFIED WHETHER LONG TERM INSULIN USE (HCC): ICD-10-CM

## 2025-01-16 DIAGNOSIS — R39.89 SUSPECTED UTI: ICD-10-CM

## 2025-01-16 DIAGNOSIS — Z01.810 PRE-OPERATIVE CARDIOVASCULAR EXAMINATION: ICD-10-CM

## 2025-01-16 NOTE — TELEPHONE ENCOUNTER
I called the patient and he was very upset because he's sick and tired of his cathter he said he wants to yank it out stay at home and die.

## 2025-01-16 NOTE — TELEPHONE ENCOUNTER
Patient is wondering if he can have his catheter changed every 3 weeks instead of two .  Please advise per patient you can leave a message

## 2025-01-16 NOTE — TELEPHONE ENCOUNTER
Patient would like to leave the bag on longer rather than every two weeks he doesn't want to hear this

## 2025-01-16 NOTE — TELEPHONE ENCOUNTER
Patient was also upset because he said every time he calls he said he gets the run around and he says no one calls him

## 2025-01-16 NOTE — TELEPHONE ENCOUNTER
This nurse spoke with patient who was initially upset because he was under the impression that he would have to have his gomez cath changed every 2 weeks. This nurse thoroughly clarified with patient that gomez cath changes are done every 4 weeks (once a month). Patient is made aware that we will no longer be doing any more voiding trials since he has failed all three. Patient is scheduled to have HoLEP procedure 4/1/25. Patient is agreeable with plan to change gomez every 4 weeks until his surgery. By end of conversation patient was pleased with the outcome and verbalized full understanding. Patient has requested that his 2/3/25 appointment with Guera BETANCOURT be cancelled as he wants to just keep with the routine gomez changes. No further assistance needed at this time.

## 2025-01-16 NOTE — TELEPHONE ENCOUNTER
Patient is very very upset about playing phone tag. He states that he has told multiple people that he gets up later in the morning and [facility]     He stated if he can potentially be called back in the next 45 mins or tomorrow after 10 am But then around 11/11:30 he starts to got for him appts.     He would like to discuss the surgery and any other dates available.

## 2025-01-16 NOTE — TELEPHONE ENCOUNTER
Spoke with patient and confirmed surgery date of:04/01/25  Type of surgery:Holep   Operating physician: Dr. Sanchez  Location of surgery: [facility]    Verbally went over prep with patient on: 01/16/25  NPO  Bowel prep? No  Hospital calls afternoon prior with arrival time -Calls Friday afternoon for Monday surgeries  Patient needs ride to and from surgery (outpatient/inpatient)   Pre-op testing to be done 2 weeks prior to surgery  Cbc bmp A1c u/c EKG   Blood thinners:   Pat will call a week prior   Clearances needed: none    Mailed/emailed to patient on:  Copy of packet scanned into Media on:  Labs in packet  Soap / Bowel prep in packet  Pre-op & Post-op in packet  Dates of H&P and post-op if needed    Consent: in Media

## 2025-01-17 NOTE — TELEPHONE ENCOUNTER
Patient asking if he is to continue with Finasteride and Flomax    Informed there is no documentation to stop it. Advised to continue until we clarify with the provider.    Please clarify, CB #[phone]

## 2025-02-12 ENCOUNTER — TELEPHONE (OUTPATIENT)
Age: 75
End: 2025-02-12

## 2025-02-12 NOTE — TELEPHONE ENCOUNTER
Patient called in d/t he had urine come out of penis around the Arauz catheter. States urine is draining into the bag without issue as well. Patient scheduled for Arauz exchange tomorrow. Advised patient as long as draining into bag then he is fine to just come in tomorrow for exchange. Reviewed Hydration, avoidance of bladder irritants and ED precautions.

## 2025-02-13 ENCOUNTER — PROCEDURE VISIT (OUTPATIENT)
Dept: UROLOGY | Facility: CLINIC | Age: 75
End: 2025-02-13
Payer: COMMERCIAL

## 2025-02-13 VITALS
SYSTOLIC BLOOD PRESSURE: 142 MMHG | DIASTOLIC BLOOD PRESSURE: 84 MMHG | OXYGEN SATURATION: 98 % | WEIGHT: 219 LBS | HEART RATE: 98 BPM | TEMPERATURE: 98.1 F | BODY MASS INDEX: 34.37 KG/M2 | HEIGHT: 67 IN

## 2025-02-13 DIAGNOSIS — R33.9 URINARY RETENTION: Primary | ICD-10-CM

## 2025-02-13 PROCEDURE — 51702 INSERT TEMP BLADDER CATH: CPT

## 2025-02-13 NOTE — PROGRESS NOTES
"2/13/2025  Melchor Adrian is a 74 y.o. male  [id number]    Diagnosis:  Chief Complaint    Urinary Retention         Patient presents for routine gomez cath exchange managed by Dr. Sanchez    Plan:  Patient to return for routine gomez cath change as scheduled.   Patient instructed to call with any questions or concerns in the meantime.    No orders of the defined types were placed in this encounter.       Vitals:    02/13/25 0930   BP: 142/84   Patient Position: Sitting   Cuff Size: Standard   Pulse: 98   Temp: 98.1 °F (36.7 °C)   TempSrc: Temporal   SpO2: 98%   Weight: 99.3 kg (219 lb)   Height: 5' 7\" (1.702 m)           Procedure:    Universal Protocol:  procedure performed by consultantConsent: Verbal consent obtained.  Risks and benefits: risks, benefits and alternatives were discussed  Consent given by: patient  Patient understanding: patient states understanding of the procedure being performed  Patient consent: the patient's understanding of the procedure matches consent given  Procedure consent: procedure consent matches procedure scheduled  Patient identity confirmed: verbally with patient    Bladder catheterization    Date/Time: 2/13/2025 9:30 AM    Performed by: Norma Dumont LPN  Authorized by: Eulogio Sahni MD    Patient location:  Bedside  Consent:     Consent given by:  Patient  Universal protocol:     Procedure explained and questions answered to patient or proxy's satisfaction: yes      Patient identity confirmed:  Verbally with patient  Pre-procedure details:     Procedure purpose:  Therapeutic    Preparation: Patient was prepped and draped in usual sterile fashion    Anesthesia (see MAR for exact dosages):     Anesthesia method:  None  Procedure details:     Bladder irrigation: no      Catheter insertion:  Indwelling    Approach: natural orifice      Catheter type:  Coude, latex and Gomez    Catheter size:  16 Fr    Number of attempts:  1    Successful placement: yes      Urine characteristics:  " Clear and yellow  Post-procedure details:     Patient tolerance of procedure:  Tolerated well, no immediate complications  Comments:      Patient presented for routine gomez cath exchange. 16 Fr latex gomez cath inserted using sterile technique, 10 ml balloon inflated, clear yellow urine return noted. Patient denies any pain/discomfort, no trauma noted to the site. Gomez cath tubing secured to right leg via stat lock, patient tolerated procedure well. Patient is educated on maintaining adequate hydration with water to prevent cath clogging and subsequent UTI's. Patient is educated on constipation, treatment, and prevention to allow for efficient urinary flow. Patient is educated on the importance of performing daily cath care to encourage cleanliness   Patient has been scheduled for next routine change.               Norma Dumont LPN

## 2025-02-14 ENCOUNTER — OFFICE VISIT (OUTPATIENT)
Dept: URGENT CARE | Facility: CLINIC | Age: 75
End: 2025-02-14
Payer: COMMERCIAL

## 2025-02-14 VITALS
SYSTOLIC BLOOD PRESSURE: 112 MMHG | BODY MASS INDEX: 34.32 KG/M2 | WEIGHT: 219.13 LBS | RESPIRATION RATE: 20 BRPM | DIASTOLIC BLOOD PRESSURE: 80 MMHG | TEMPERATURE: 97.3 F | HEART RATE: 101 BPM | OXYGEN SATURATION: 97 %

## 2025-02-14 DIAGNOSIS — L03.116 CELLULITIS OF LEFT KNEE: Primary | ICD-10-CM

## 2025-02-14 PROCEDURE — 99213 OFFICE O/P EST LOW 20 MIN: CPT
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
## 2025-03-07 ENCOUNTER — OFFICE VISIT (OUTPATIENT)
Dept: URGENT CARE | Facility: CLINIC | Age: 75
End: 2025-03-07
Payer: COMMERCIAL

## 2025-03-07 VITALS
DIASTOLIC BLOOD PRESSURE: 80 MMHG | HEART RATE: 98 BPM | TEMPERATURE: 97.7 F | OXYGEN SATURATION: 98 % | SYSTOLIC BLOOD PRESSURE: 140 MMHG | RESPIRATION RATE: 18 BRPM

## 2025-03-07 DIAGNOSIS — J06.9 ACUTE URI: Primary | ICD-10-CM

## 2025-03-07 DIAGNOSIS — R05.1 ACUTE COUGH: ICD-10-CM

## 2025-03-07 PROBLEM — N40.0 BENIGN PROSTATIC HYPERPLASIA WITHOUT URINARY OBSTRUCTION: Status: ACTIVE | Noted: 2025-03-07

## 2025-03-07 PROBLEM — G47.00 INSOMNIA: Status: ACTIVE | Noted: 2025-03-07

## 2025-03-07 PROBLEM — M15.9 GENERALIZED OSTEOARTHRITIS: Status: ACTIVE | Noted: 2025-03-07

## 2025-03-07 PROBLEM — H52.00 HYPERMETROPIA: Status: ACTIVE | Noted: 2025-03-07

## 2025-03-07 PROCEDURE — 99213 OFFICE O/P EST LOW 20 MIN: CPT

## 2025-03-07 RX ORDER — BENZONATATE 200 MG/1
200 CAPSULE ORAL 3 TIMES DAILY PRN
Qty: 20 CAPSULE | Refills: 0 | Status: SHIPPED | OUTPATIENT
Start: 2025-03-07

## 2025-03-07 NOTE — PROGRESS NOTES
[facility]    NAME: Melchor Adrian is a 74 y.o. male  : 1950    MRN: [id number]  DATE: 2025  TIME: 6:32 PM    Assessment and Plan   Acute URI [J06.9]  1. Acute URI        2. Acute cough  benzonatate (TESSALON) 200 MG capsule          Symptoms consistent with viral illness.  Given education on supportive measures for symptoms in discharge instructions.  Follow up with primary care in 3-5 days.  Go to ER if symptoms get worse.     Patient Instructions     --Rest, drink plenty of fluids     --For nasal/sinus congestion, most oral pills do not help symptoms. Take nasal sprays such as Afrin (for 3 days use only) or Sudafed with pseudoephedrine (must buy at the pharmacy counter) for symptoms. Do not use these if you have heart problems/high blood pressure. You can also try Flonase, steroid nasal sprays to help.     --For cough, you can use honey or plain Robitussion or Mucinex.     --For sore throat, you can take OTC lozenges, use warm fluids    -For cold symptoms with high blood pressure take Coricidin cough/cold or any cold medicine with HBP in the name.     -If tests have been performed at [facility], our office will contact you with results if changes need to be made to the care plan discussed with you at the visit.  You can review your full results on [facility] MyChart    --You should contact your primary care provider if your symptoms are not improved or get worse over the next 7-10 days. This includes new onset fever, localized ear pain for over 2 days, sinus pain, as well as worsening cough. Go to hospital with chest pain, severe shortness of breath, or significant weakness/fatigue.      Chief Complaint     Chief Complaint   Patient presents with    Cough     3-4 days. No congestion PND or sinus pressure. No fever or chills, no SOB.          History of Present Illness       Presents sick symptoms including dry cough for 3 to 4 days.  Denies congestion postnasal drip or sinus pressure.   Denies fevers chills or shortness of breath.  Denies history of lung issues.  Of note he is on 8 agent [place] registry.        Review of Systems   Review of Systems   Constitutional:  Negative for chills and fever.   HENT:  Negative for ear pain and sore throat.    Respiratory:  Positive for cough. Negative for shortness of breath.    Cardiovascular:  Negative for chest pain and palpitations.   Gastrointestinal:  Negative for diarrhea, nausea and vomiting.   Musculoskeletal:  Negative for myalgias.   Neurological:  Negative for headaches.   All other systems reviewed and are negative.        Current Medications       Current Outpatient Medications:     amLODIPine-benazepril (LOTREL 2.5-10) 2.5-10 MG per capsule, Take 1 capsule by mouth daily, Disp: , Rfl:     benzonatate (TESSALON) 200 MG capsule, Take 1 capsule (200 mg total) by mouth 3 (three) times a day as needed for cough, Disp: 20 capsule, Rfl: 0    finasteride (PROSCAR) 5 mg tablet, Take 1 tablet (5 mg total) by mouth daily, Disp: 90 tablet, Rfl: 1    FLUoxetine (PROzac) 20 mg/5 mL solution, Take by mouth daily, Disp: , Rfl:     levothyroxine (Synthroid) 25 mcg tablet, Take 37.5 mcg by mouth, Disp: , Rfl:     metroNIDAZOLE (METROGEL) 0.75 % gel, Apply topically 2 (two) times a day, Disp: 45 g, Rfl: 2    omeprazole (PriLOSEC) 40 MG capsule, Take 40 mg by mouth daily, Disp: , Rfl:     rosuvastatin (CRESTOR) 10 MG tablet, Take 5 mg by mouth daily, Disp: , Rfl:     simethicone (MYLICON) 80 mg chewable tablet, Chew 80 mg every 6 (six) hours as needed for flatulence, Disp: , Rfl:     tamsulosin (FLOMAX) 0.4 mg, Take 1 capsule (0.4 mg total) by mouth daily with dinner, Disp: 90 capsule, Rfl: 1    triamcinolone (KENALOG) 0.1 % cream, Apply to affected spots twice a day for 2 weeks on the 1 week off., Disp: 80 g, Rfl: 1    clindamycin (CLEOCIN T) 1 % external solution, Apply topically 2 (two) times a day To face. Melchor Adrian,  1950, # [id number] (Patient not  taking: Reported on 11/9/2024), Disp: 30 mL, Rfl: 2    Current Allergies     Allergies as of 03/07/2025 - Reviewed 03/07/2025   Allergen Reaction Noted    Atorvastatin Other (See Comments) 10/16/2015    Simvastatin Other (See Comments) 01/15/2013            The following portions of the patient's history were reviewed and updated as appropriate: allergies, current medications, past family history, past medical history, past social history, past surgical history and problem list.     Past Medical History:   Diagnosis Date    Basal cell carcinoma     Left Upper back    GERD (gastroesophageal reflux disease)     Hyperlipidemia     Hypertension     PTSD (post-traumatic stress disorder)     SIRS (systemic inflammatory response syndrome) (HCC) 11/22/2024    Urinary retention 11/22/2024       Past Surgical History:   Procedure Laterality Date    CHOLECYSTECTOMY LAPAROSCOPIC N/A 11/14/2024    Procedure: CHOLECYSTECTOMY LAPAROSCOPIC;  Surgeon: Law Lew DO;  Location: [facility];  Service: General    COLONOSCOPY      KNEE SURGERY      MASS EXCISION N/A 5/27/2020    Procedure: EXCISION BIOPSY TISSUE LESION/MASS RIGHT BUTTOCK;  Surgeon: Tez Erickson MD;  Location: [facility];  Service: General       Family History   Problem Relation Age of Onset    Heart disease Mother     Heart disease Father          Medications have been verified.        Objective   /80   Pulse 98   Temp 97.7 °F (36.5 °C)   Resp 18   SpO2 98%        Physical Exam     Physical Exam  Vitals reviewed.   Constitutional:       General: He is not in acute distress.     Appearance: Normal appearance.   HENT:      Right Ear: Tympanic membrane, ear canal and external ear normal.      Left Ear: Tympanic membrane, ear canal and external ear normal.      Nose: Nose normal.      Mouth/Throat:      Mouth: Mucous membranes are moist.      Pharynx: No posterior oropharyngeal erythema.   Eyes:      Conjunctiva/sclera: Conjunctivae normal.    Cardiovascular:      Rate and Rhythm: Normal rate and regular rhythm.      Pulses: Normal pulses.      Heart sounds: Normal heart sounds. No murmur heard.  Pulmonary:      Effort: Pulmonary effort is normal. No respiratory distress.      Breath sounds: Normal breath sounds.      Comments: Dry frequent cough  Skin:     General: Skin is warm and dry.   Neurological:      General: No focal deficit present.      Mental Status: He is alert and oriented to person, place, and time.   Psychiatric:         Mood and Affect: Mood normal.         Behavior: Behavior normal.

## 2025-03-13 ENCOUNTER — PROCEDURE VISIT (OUTPATIENT)
Dept: UROLOGY | Facility: CLINIC | Age: 75
End: 2025-03-13
Payer: COMMERCIAL

## 2025-03-13 VITALS
OXYGEN SATURATION: 98 % | SYSTOLIC BLOOD PRESSURE: 138 MMHG | HEIGHT: 67 IN | TEMPERATURE: 97.7 F | WEIGHT: 219 LBS | BODY MASS INDEX: 34.37 KG/M2 | DIASTOLIC BLOOD PRESSURE: 78 MMHG | HEART RATE: 90 BPM

## 2025-03-13 DIAGNOSIS — R33.9 URINARY RETENTION: Primary | ICD-10-CM

## 2025-03-13 PROCEDURE — 51702 INSERT TEMP BLADDER CATH: CPT

## 2025-03-13 NOTE — PROGRESS NOTES
"3/13/2025  Melchor Adrian is a 74 y.o. male  [id number]    Diagnosis:  Chief Complaint    Urinary Retention         Patient presents for routine gomez cath change managed by Dr. Sanchez    Plan:  Patient is scheduled for LASER ENUCLEATION PROSTATE W MORCELLATION on 4/1/25 with Dr. Sanchez. Patient is made aware of the pre op urine and other lab work that needs to be obtained 2 weeks prior to procedure. Patient verbalizes understanding.   Patient instructed to call with any questions or concerns in the meantime.         Vitals:    03/13/25 1359   BP: 138/78   Patient Position: Sitting   Cuff Size: Standard   Pulse: 90   Temp: 97.7 °F (36.5 °C)   TempSrc: Temporal   SpO2: 98%   Weight: 99.3 kg (219 lb)   Height: 5' 7\" (1.702 m)           Procedure:    Universal Protocol:  Consent: Verbal consent obtained.  Risks and benefits: risks, benefits and alternatives were discussed  Consent given by: patient  Patient understanding: patient states understanding of the procedure being performed  Patient consent: the patient's understanding of the procedure matches consent given  Procedure consent: procedure consent matches procedure scheduled  Patient identity confirmed: verbally with patient    Bladder catheterization    Date/Time: 3/13/2025 11:00 AM    Performed by: Norma Dumont LPN  Authorized by: Dwight Sanchez DO    Patient location:  Bedside  Consent:     Consent given by:  Patient  Universal protocol:     Procedure explained and questions answered to patient or proxy's satisfaction: yes      Patient identity confirmed:  Verbally with patient  Pre-procedure details:     Procedure purpose:  Therapeutic    Preparation: Patient was prepped and draped in usual sterile fashion    Anesthesia (see MAR for exact dosages):     Anesthesia method:  None  Procedure details:     Bladder irrigation: no      Catheter insertion:  Indwelling    Approach: natural orifice      Catheter type:  Coude, latex and Gomez    Catheter size: "  16 Fr    Number of attempts:  1    Successful placement: yes      Urine characteristics:  Clear and yellow  Post-procedure details:     Patient tolerance of procedure:  Tolerated well, no immediate complications  Comments:      Patient presented for routine gomez cath exchange. 16 Fr latex gomez cath inserted using sterile technique, 10 ml balloon inflated, clear yellow urine return noted. Patient denies any pain/discomfort, no trauma noted to the site. Gomez cath tubing secured to right leg via stat lock, patient tolerated procedure well. Patient is educated on maintaining adequate hydration with water to prevent cath clogging and subsequent UTI's. Patient is educated on constipation, treatment, and prevention to allow for efficient urinary flow. Patient is educated on the importance of performing daily cath care to encourage cleanliness          Norma Dumont LPN

## 2025-03-18 ENCOUNTER — APPOINTMENT (OUTPATIENT)
Dept: LAB | Facility: HOSPITAL | Age: 75
End: 2025-03-18
Payer: COMMERCIAL

## 2025-03-18 ENCOUNTER — LAB REQUISITION (OUTPATIENT)
Dept: LAB | Facility: HOSPITAL | Age: 75
End: 2025-03-18
Payer: COMMERCIAL

## 2025-03-18 ENCOUNTER — APPOINTMENT (OUTPATIENT)
Dept: LAB | Facility: MEDICAL CENTER | Age: 75
End: 2025-03-18
Payer: COMMERCIAL

## 2025-03-18 DIAGNOSIS — E13.69 OTHER SPECIFIED DIABETES MELLITUS WITH OTHER SPECIFIED COMPLICATION, UNSPECIFIED WHETHER LONG TERM INSULIN USE (HCC): ICD-10-CM

## 2025-03-18 DIAGNOSIS — R33.9 RETENTION OF URINE, UNSPECIFIED: ICD-10-CM

## 2025-03-18 DIAGNOSIS — Z01.812 PRE-OPERATIVE LABORATORY EXAMINATION: ICD-10-CM

## 2025-03-18 DIAGNOSIS — Z01.810 PRE-OPERATIVE CARDIOVASCULAR EXAMINATION: ICD-10-CM

## 2025-03-18 DIAGNOSIS — R33.9 URINARY RETENTION: ICD-10-CM

## 2025-03-18 DIAGNOSIS — R39.89 SUSPECTED UTI: ICD-10-CM

## 2025-03-18 DIAGNOSIS — Z01.812 ENCOUNTER FOR PREPROCEDURAL LABORATORY EXAMINATION: ICD-10-CM

## 2025-03-18 DIAGNOSIS — E13.69 OTHER SPECIFIED DIABETES MELLITUS WITH OTHER SPECIFIED COMPLICATION (HCC): ICD-10-CM

## 2025-03-18 LAB
ABO GROUP BLD: NORMAL
ALBUMIN SERPL BCG-MCNC: 4.4 G/DL (ref 3.5–5)
ALP SERPL-CCNC: 77 U/L (ref 34–104)
ALT SERPL W P-5'-P-CCNC: 16 U/L (ref 7–52)
ANION GAP SERPL CALCULATED.3IONS-SCNC: 12 MMOL/L (ref 4–13)
AST SERPL W P-5'-P-CCNC: 16 U/L (ref 13–39)
BASOPHILS # BLD AUTO: 0.05 THOUSANDS/ÂΜL (ref 0–0.1)
BASOPHILS NFR BLD AUTO: 1 % (ref 0–1)
BILIRUB SERPL-MCNC: 0.8 MG/DL (ref 0.2–1)
BLD GP AB SCN SERPL QL: NEGATIVE
BUN SERPL-MCNC: 17 MG/DL (ref 5–25)
CALCIUM SERPL-MCNC: 9.6 MG/DL (ref 8.4–10.2)
CHLORIDE SERPL-SCNC: 106 MMOL/L (ref 96–108)
CO2 SERPL-SCNC: 25 MMOL/L (ref 21–32)
CREAT SERPL-MCNC: 1.18 MG/DL (ref 0.6–1.3)
EOSINOPHIL # BLD AUTO: 0.27 THOUSAND/ÂΜL (ref 0–0.61)
EOSINOPHIL NFR BLD AUTO: 3 % (ref 0–6)
ERYTHROCYTE [DISTWIDTH] IN BLOOD BY AUTOMATED COUNT: 13.4 % (ref 11.6–15.1)
EST. AVERAGE GLUCOSE BLD GHB EST-MCNC: 120 MG/DL
GFR SERPL CREATININE-BSD FRML MDRD: 60 ML/MIN/1.73SQ M
GLUCOSE P FAST SERPL-MCNC: 109 MG/DL (ref 65–99)
HBA1C MFR BLD: 5.8 %
HCT VFR BLD AUTO: 49.1 % (ref 36.5–49.3)
HGB BLD-MCNC: 15.6 G/DL (ref 12–17)
IMM GRANULOCYTES # BLD AUTO: 0.02 THOUSAND/UL (ref 0–0.2)
IMM GRANULOCYTES NFR BLD AUTO: 0 % (ref 0–2)
LYMPHOCYTES # BLD AUTO: 1.61 THOUSANDS/ÂΜL (ref 0.6–4.47)
LYMPHOCYTES NFR BLD AUTO: 19 % (ref 14–44)
MCH RBC QN AUTO: 26.4 PG (ref 26.8–34.3)
MCHC RBC AUTO-ENTMCNC: 31.8 G/DL (ref 31.4–37.4)
MCV RBC AUTO: 83 FL (ref 82–98)
MONOCYTES # BLD AUTO: 0.72 THOUSAND/ÂΜL (ref 0.17–1.22)
MONOCYTES NFR BLD AUTO: 9 % (ref 4–12)
NEUTROPHILS # BLD AUTO: 5.61 THOUSANDS/ÂΜL (ref 1.85–7.62)
NEUTS SEG NFR BLD AUTO: 68 % (ref 43–75)
NRBC BLD AUTO-RTO: 0 /100 WBCS
PLATELET # BLD AUTO: 307 THOUSANDS/UL (ref 149–390)
PMV BLD AUTO: 10.9 FL (ref 8.9–12.7)
POTASSIUM SERPL-SCNC: 3.8 MMOL/L (ref 3.5–5.3)
PROT SERPL-MCNC: 7.4 G/DL (ref 6.4–8.4)
RBC # BLD AUTO: 5.9 MILLION/UL (ref 3.88–5.62)
RH BLD: POSITIVE
SODIUM SERPL-SCNC: 143 MMOL/L (ref 135–147)
SPECIMEN EXPIRATION DATE: NORMAL
WBC # BLD AUTO: 8.28 THOUSAND/UL (ref 4.31–10.16)

## 2025-03-18 PROCEDURE — 80053 COMPREHEN METABOLIC PANEL: CPT

## 2025-03-18 PROCEDURE — 86850 RBC ANTIBODY SCREEN: CPT | Performed by: UROLOGY

## 2025-03-18 PROCEDURE — 83036 HEMOGLOBIN GLYCOSYLATED A1C: CPT

## 2025-03-18 PROCEDURE — 87077 CULTURE AEROBIC IDENTIFY: CPT

## 2025-03-18 PROCEDURE — 87186 SC STD MICRODIL/AGAR DIL: CPT

## 2025-03-18 PROCEDURE — 36415 COLL VENOUS BLD VENIPUNCTURE: CPT

## 2025-03-18 PROCEDURE — 86901 BLOOD TYPING SEROLOGIC RH(D): CPT | Performed by: UROLOGY

## 2025-03-18 PROCEDURE — 85025 COMPLETE CBC W/AUTO DIFF WBC: CPT

## 2025-03-18 PROCEDURE — 86900 BLOOD TYPING SEROLOGIC ABO: CPT | Performed by: UROLOGY

## 2025-03-18 PROCEDURE — 87086 URINE CULTURE/COLONY COUNT: CPT

## 2025-03-20 ENCOUNTER — TELEPHONE (OUTPATIENT)
Dept: UROLOGY | Facility: CLINIC | Age: 75
End: 2025-03-20

## 2025-03-20 DIAGNOSIS — R33.9 URINARY RETENTION: Primary | ICD-10-CM

## 2025-03-20 LAB — BACTERIA UR CULT: ABNORMAL

## 2025-03-20 RX ORDER — CEPHALEXIN 500 MG/1
500 CAPSULE ORAL EVERY 6 HOURS SCHEDULED
Qty: 28 CAPSULE | Refills: 0 | Status: SHIPPED | OUTPATIENT
Start: 2025-03-25 | End: 2025-04-01

## 2025-03-20 NOTE — TELEPHONE ENCOUNTER
Per patient for his Keflex he will need referral number for [facility] called into pharmacy below so that they will cover medication through [facility].     CB for patient once this is done: [phone]

## 2025-03-20 NOTE — TELEPHONE ENCOUNTER
Please advise       Status: Final result       Next appt: None       Dx: Urinary retention; Suspected UTI    Test Result Released: No (inaccessible in [facility])    Specimen Information: Urine, Clean Catch   0 Result Notes  Urine Culture >100,000 cfu/ml Escherichia coli Abnormal    This organism has been edited. The previous result was Gram Negative Og Enteric Like on 3/19/2025 at 1433 EDT.        Susceptibility     Escherichia coli     PADMA     Ampicillin ($$) <=8.00 ug/ml Susceptible     Aztreonam ($$$) <=4 ug/ml Susceptible     Cefazolin ($) <=2.00 ug/ml Susceptible     Ciprofloxacin ($) <=0.25 ug/ml Susceptible     Gentamicin ($$) <=2 ug/ml Susceptible     Levofloxacin ($) <=0.50 ug/ml Susceptible     Nitrofurantoin <=32 ug/ml Susceptible     Tetracycline <=4 ug/ml Susceptible     Trimethoprim + Sulfamethoxazole ($$$) <=0.5/9.5 u... Susceptible     ZID Performed Yes

## 2025-03-20 NOTE — TELEPHONE ENCOUNTER
This nurse attempted to call patient to make him aware of abx therapy sent to pharmacy pre op by Jeanna BETANCOURT. Detailed message is left providing medication name and start date along with office phone number for any additional questions or concerns.     If patient calls back, please relay message accordingly. Thank you!     Jeanna Magana PA-C Physician Assistant Signed 10:49 AM       preop ucx  has gomez catheter  upcoming holep 4/1     please start keflex 500mg qid on 3/25 and take for 7 full days

## 2025-03-20 NOTE — TELEPHONE ENCOUNTER
Patient called upset that we didn't call in his prescription according to pharmacy staff. I told him we did and that I would call to pharmacy to verify. I spoke with pharmacist who said the prescription is in but they need to verify coverage with patient because of his [employer] insurance. She said she would call patient on his cell at [phone].

## 2025-03-20 NOTE — TELEPHONE ENCOUNTER
preop ucx  has gomez catheter  upcoming holep 4/1    please start keflex 500mg qid on 3/25 and take for 7 full days

## 2025-03-24 ENCOUNTER — PREP FOR PROCEDURE (OUTPATIENT)
Dept: UROLOGY | Facility: CLINIC | Age: 75
End: 2025-03-24

## 2025-03-24 RX ORDER — FLUOXETINE 20 MG/1
20 TABLET, FILM COATED ORAL DAILY
COMMUNITY

## 2025-03-24 RX ORDER — AMLODIPINE BESYLATE 5 MG/1
5 TABLET ORAL
COMMUNITY
Start: 2024-12-31

## 2025-03-24 NOTE — PRE-PROCEDURE INSTRUCTIONS
Pre-Surgery Instructions:   Medication Instructions    amLODIPine (NORVASC) 5 mg tablet Take day of surgery.    [START ON 3/25/2025] cephalexin (KEFLEX) 500 mg capsule Take day of surgery.    FLUoxetine 20 MG tablet Take day of surgery.    levothyroxine (Synthroid) 25 mcg tablet Take day of surgery.    omeprazole (PriLOSEC) 40 MG capsule Take day of surgery.    rosuvastatin (CRESTOR) 10 MG tablet Take night before surgery     Spoke with pt via phone.    Medication instructions for day of surgery reviewed. Please take all instructed medications with only a sip of water.       You will receive a call one business day prior to surgery with an arrival time and hospital directions. If your surgery is scheduled on a Monday, the hospital will be calling you on the Friday prior to your surgery. If you have not heard from anyone by 8pm, please call the hospital supervisor through the hospital  at [phone]. ([place] [phone] or [place] [phone]).    Do not eat or drink anything after midnight the night before your surgery, including candy, mints, lifesavers, or chewing gum. Do not drink alcohol 24hrs before your surgery. Try not to smoke at least 24hrs before your surgery.       Follow the pre surgery showering instructions as listed in the “My Surgical Experience Booklet” or otherwise provided by your surgeon's office. Do not use a blade to shave the surgical area 1 week before surgery. It is okay to use a clean electric clippers up to 24 hours before surgery. Do not apply any lotions, creams, including makeup, cologne, deodorant, or perfumes after showering on the day of your surgery. Do not use dry shampoo, hair spray, hair gel, or any type of hair products.     No contact lenses, eye make-up, or artificial eyelashes. Remove nail polish, including gel polish, and any artificial, gel, or acrylic nails if possible. Remove all jewelry including rings and body piercing jewelry.     Wear causal clothing  that is easy to take on and off. Consider your type of surgery.    Keep any valuables, jewelry, piercings at home. Please bring any specially ordered equipment (sling, braces) if indicated.    Arrange for a responsible person to drive you to and from the hospital on the day of your surgery. Please confirm the visitor policy for the day of your procedure when you receive your phone call with an arrival time.     Call the surgeon's office with any new illnesses, exposures, or additional questions prior to surgery.    Please reference your “My Surgical Experience Booklet” for additional information to prepare for your upcoming surgery.

## 2025-03-31 ENCOUNTER — ANESTHESIA EVENT (OUTPATIENT)
Dept: PERIOP | Facility: HOSPITAL | Age: 75
End: 2025-03-31
Payer: COMMERCIAL

## 2025-04-01 ENCOUNTER — HOSPITAL ENCOUNTER (OUTPATIENT)
Facility: HOSPITAL | Age: 75
Setting detail: OUTPATIENT SURGERY
Discharge: HOME | End: 2025-04-02
Attending: UROLOGY | Admitting: UROLOGY
Payer: COMMERCIAL

## 2025-04-01 ENCOUNTER — ANESTHESIA (OUTPATIENT)
Dept: PERIOP | Facility: HOSPITAL | Age: 75
End: 2025-04-01
Payer: COMMERCIAL

## 2025-04-01 ENCOUNTER — TELEPHONE (OUTPATIENT)
Dept: UROLOGY | Facility: CLINIC | Age: 75
End: 2025-04-01

## 2025-04-01 DIAGNOSIS — N40.1 BPH WITH OBSTRUCTION/LOWER URINARY TRACT SYMPTOMS: ICD-10-CM

## 2025-04-01 DIAGNOSIS — N13.8 BPH WITH OBSTRUCTION/LOWER URINARY TRACT SYMPTOMS: ICD-10-CM

## 2025-04-01 LAB
ABO GROUP BLD: NORMAL
ANION GAP SERPL CALCULATED.3IONS-SCNC: 5 MMOL/L (ref 4–13)
BASE EXCESS BLDA CALC-SCNC: -4 MMOL/L (ref -2–3)
BUN SERPL-MCNC: 19 MG/DL (ref 5–25)
CA-I BLD-SCNC: 1.24 MMOL/L (ref 1.12–1.32)
CALCIUM SERPL-MCNC: 7 MG/DL (ref 8.4–10.2)
CHLORIDE SERPL-SCNC: 118 MMOL/L (ref 96–108)
CO2 SERPL-SCNC: 21 MMOL/L (ref 21–32)
CREAT SERPL-MCNC: 1.05 MG/DL (ref 0.6–1.3)
ERYTHROCYTE [DISTWIDTH] IN BLOOD BY AUTOMATED COUNT: 13.8 % (ref 11.6–15.1)
GFR SERPL CREATININE-BSD FRML MDRD: 69 ML/MIN/1.73SQ M
GLUCOSE P FAST SERPL-MCNC: 102 MG/DL (ref 65–99)
GLUCOSE SERPL-MCNC: 100 MG/DL (ref 65–140)
GLUCOSE SERPL-MCNC: 102 MG/DL (ref 65–140)
HCO3 BLDA-SCNC: 24.9 MMOL/L (ref 24–30)
HCT VFR BLD AUTO: 41.2 % (ref 36.5–49.3)
HCT VFR BLD CALC: 41 % (ref 36.5–49.3)
HGB BLD-MCNC: 13.4 G/DL (ref 12–17)
HGB BLDA-MCNC: 13.9 G/DL (ref 12–17)
MCH RBC QN AUTO: 26.8 PG (ref 26.8–34.3)
MCHC RBC AUTO-ENTMCNC: 32.5 G/DL (ref 31.4–37.4)
MCV RBC AUTO: 82 FL (ref 82–98)
PCO2 BLD: 27 MMOL/L (ref 21–32)
PCO2 BLD: 59.8 MM HG (ref 42–50)
PH BLD: 7.23 [PH] (ref 7.3–7.4)
PLATELET # BLD AUTO: 215 THOUSANDS/UL (ref 149–390)
PMV BLD AUTO: 10.4 FL (ref 8.9–12.7)
PO2 BLD: 56 MM HG (ref 35–45)
POTASSIUM BLD-SCNC: 4.4 MMOL/L (ref 3.5–5.3)
POTASSIUM SERPL-SCNC: 3.8 MMOL/L (ref 3.5–5.3)
RBC # BLD AUTO: 5 MILLION/UL (ref 3.88–5.62)
RH BLD: POSITIVE
SAO2 % BLD FROM PO2: 82 % (ref 60–85)
SODIUM BLD-SCNC: 144 MMOL/L (ref 136–145)
SODIUM SERPL-SCNC: 144 MMOL/L (ref 135–147)
SPECIMEN SOURCE: ABNORMAL
WBC # BLD AUTO: 12.79 THOUSAND/UL (ref 4.31–10.16)

## 2025-04-01 PROCEDURE — 85027 COMPLETE CBC AUTOMATED: CPT | Performed by: UROLOGY

## 2025-04-01 PROCEDURE — C1769 GUIDE WIRE: HCPCS | Performed by: UROLOGY

## 2025-04-01 PROCEDURE — 84295 ASSAY OF SERUM SODIUM: CPT

## 2025-04-01 PROCEDURE — 88305 TISSUE EXAM BY PATHOLOGIST: CPT | Performed by: PATHOLOGY

## 2025-04-01 PROCEDURE — 82803 BLOOD GASES ANY COMBINATION: CPT

## 2025-04-01 PROCEDURE — 94664 DEMO&/EVAL PT USE INHALER: CPT

## 2025-04-01 PROCEDURE — 52649 PROSTATE LASER ENUCLEATION: CPT | Performed by: UROLOGY

## 2025-04-01 PROCEDURE — 84132 ASSAY OF SERUM POTASSIUM: CPT

## 2025-04-01 PROCEDURE — 82947 ASSAY GLUCOSE BLOOD QUANT: CPT

## 2025-04-01 PROCEDURE — 80048 BASIC METABOLIC PNL TOTAL CA: CPT | Performed by: UROLOGY

## 2025-04-01 PROCEDURE — C1782 MORCELLATOR: HCPCS | Performed by: UROLOGY

## 2025-04-01 PROCEDURE — 85014 HEMATOCRIT: CPT

## 2025-04-01 PROCEDURE — 82330 ASSAY OF CALCIUM: CPT

## 2025-04-01 PROCEDURE — NC001 PR NO CHARGE: Performed by: UROLOGY

## 2025-04-01 PROCEDURE — 94760 N-INVAS EAR/PLS OXIMETRY 1: CPT

## 2025-04-01 PROCEDURE — 88344 IMHCHEM/IMCYTCHM EA MLT ANTB: CPT | Performed by: PATHOLOGY

## 2025-04-01 RX ORDER — PANTOPRAZOLE SODIUM 40 MG/1
40 TABLET, DELAYED RELEASE ORAL
Status: DISCONTINUED | OUTPATIENT
Start: 2025-04-02 | End: 2025-04-02 | Stop reason: HOSPADM

## 2025-04-01 RX ORDER — ONDANSETRON 2 MG/ML
4 INJECTION INTRAMUSCULAR; INTRAVENOUS ONCE AS NEEDED
Status: DISCONTINUED | OUTPATIENT
Start: 2025-04-01 | End: 2025-04-01 | Stop reason: HOSPADM

## 2025-04-01 RX ORDER — SODIUM CHLORIDE, SODIUM LACTATE, POTASSIUM CHLORIDE, CALCIUM CHLORIDE 600; 310; 30; 20 MG/100ML; MG/100ML; MG/100ML; MG/100ML
INJECTION, SOLUTION INTRAVENOUS CONTINUOUS PRN
Status: DISCONTINUED | OUTPATIENT
Start: 2025-04-01 | End: 2025-04-01

## 2025-04-01 RX ORDER — LEVOTHYROXINE SODIUM 75 UG/1
37.5 TABLET ORAL
Status: DISCONTINUED | OUTPATIENT
Start: 2025-04-02 | End: 2025-04-02 | Stop reason: HOSPADM

## 2025-04-01 RX ORDER — SUCCINYLCHOLINE/SOD CL,ISO/PF 100 MG/5ML
SYRINGE (ML) INTRAVENOUS AS NEEDED
Status: DISCONTINUED | OUTPATIENT
Start: 2025-04-01 | End: 2025-04-01

## 2025-04-01 RX ORDER — DEXAMETHASONE SODIUM PHOSPHATE 10 MG/ML
INJECTION, SOLUTION INTRAMUSCULAR; INTRAVENOUS AS NEEDED
Status: DISCONTINUED | OUTPATIENT
Start: 2025-04-01 | End: 2025-04-01

## 2025-04-01 RX ORDER — HEPARIN SODIUM 5000 [USP'U]/ML
5000 INJECTION, SOLUTION INTRAVENOUS; SUBCUTANEOUS EVERY 8 HOURS SCHEDULED
Status: DISCONTINUED | OUTPATIENT
Start: 2025-04-01 | End: 2025-04-02 | Stop reason: HOSPADM

## 2025-04-01 RX ORDER — ONDANSETRON 2 MG/ML
INJECTION INTRAMUSCULAR; INTRAVENOUS AS NEEDED
Status: DISCONTINUED | OUTPATIENT
Start: 2025-04-01 | End: 2025-04-01

## 2025-04-01 RX ORDER — PROPOFOL 10 MG/ML
INJECTION, EMULSION INTRAVENOUS AS NEEDED
Status: DISCONTINUED | OUTPATIENT
Start: 2025-04-01 | End: 2025-04-01

## 2025-04-01 RX ORDER — SODIUM CHLORIDE, SODIUM LACTATE, POTASSIUM CHLORIDE, CALCIUM CHLORIDE 600; 310; 30; 20 MG/100ML; MG/100ML; MG/100ML; MG/100ML
50 INJECTION, SOLUTION INTRAVENOUS CONTINUOUS
Status: DISCONTINUED | OUTPATIENT
Start: 2025-04-01 | End: 2025-04-02

## 2025-04-01 RX ORDER — ROCURONIUM BROMIDE 10 MG/ML
INJECTION, SOLUTION INTRAVENOUS AS NEEDED
Status: DISCONTINUED | OUTPATIENT
Start: 2025-04-01 | End: 2025-04-01

## 2025-04-01 RX ORDER — DOCUSATE SODIUM 100 MG/1
100 CAPSULE, LIQUID FILLED ORAL 2 TIMES DAILY
Status: DISCONTINUED | OUTPATIENT
Start: 2025-04-01 | End: 2025-04-02 | Stop reason: HOSPADM

## 2025-04-01 RX ORDER — FENTANYL CITRATE/PF 50 MCG/ML
25 SYRINGE (ML) INJECTION
Status: DISCONTINUED | OUTPATIENT
Start: 2025-04-01 | End: 2025-04-01 | Stop reason: HOSPADM

## 2025-04-01 RX ORDER — FINASTERIDE 5 MG/1
5 TABLET, FILM COATED ORAL DAILY
Status: DISCONTINUED | OUTPATIENT
Start: 2025-04-01 | End: 2025-04-02 | Stop reason: HOSPADM

## 2025-04-01 RX ORDER — AMLODIPINE BESYLATE 5 MG/1
5 TABLET ORAL DAILY
Status: DISCONTINUED | OUTPATIENT
Start: 2025-04-01 | End: 2025-04-02 | Stop reason: HOSPADM

## 2025-04-01 RX ORDER — NITROFURANTOIN 25; 75 MG/1; MG/1
100 CAPSULE ORAL 2 TIMES DAILY WITH MEALS
Status: DISCONTINUED | OUTPATIENT
Start: 2025-04-01 | End: 2025-04-02 | Stop reason: HOSPADM

## 2025-04-01 RX ORDER — MAGNESIUM HYDROXIDE 1200 MG/15ML
3000 LIQUID ORAL CONTINUOUS
Status: DISCONTINUED | OUTPATIENT
Start: 2025-04-01 | End: 2025-04-02

## 2025-04-01 RX ORDER — MAGNESIUM HYDROXIDE 1200 MG/15ML
LIQUID ORAL AS NEEDED
Status: DISCONTINUED | OUTPATIENT
Start: 2025-04-01 | End: 2025-04-01 | Stop reason: HOSPADM

## 2025-04-01 RX ORDER — ATORVASTATIN CALCIUM 10 MG/1
10 TABLET, FILM COATED ORAL
Status: DISCONTINUED | OUTPATIENT
Start: 2025-04-01 | End: 2025-04-02 | Stop reason: HOSPADM

## 2025-04-01 RX ORDER — CEFAZOLIN SODIUM 2 G/50ML
2000 SOLUTION INTRAVENOUS
Status: COMPLETED | OUTPATIENT
Start: 2025-04-01 | End: 2025-04-01

## 2025-04-01 RX ORDER — LIDOCAINE HYDROCHLORIDE 20 MG/ML
INJECTION, SOLUTION EPIDURAL; INFILTRATION; INTRACAUDAL; PERINEURAL AS NEEDED
Status: DISCONTINUED | OUTPATIENT
Start: 2025-04-01 | End: 2025-04-01

## 2025-04-01 RX ORDER — HYDROMORPHONE HCL/PF 1 MG/ML
0.5 SYRINGE (ML) INJECTION
Status: DISCONTINUED | OUTPATIENT
Start: 2025-04-01 | End: 2025-04-01 | Stop reason: HOSPADM

## 2025-04-01 RX ORDER — MIDAZOLAM HYDROCHLORIDE 2 MG/2ML
INJECTION, SOLUTION INTRAMUSCULAR; INTRAVENOUS AS NEEDED
Status: DISCONTINUED | OUTPATIENT
Start: 2025-04-01 | End: 2025-04-01

## 2025-04-01 RX ORDER — ACETAMINOPHEN 325 MG/1
650 TABLET ORAL EVERY 6 HOURS PRN
Status: DISCONTINUED | OUTPATIENT
Start: 2025-04-01 | End: 2025-04-02 | Stop reason: HOSPADM

## 2025-04-01 RX ORDER — FENTANYL CITRATE 50 UG/ML
INJECTION, SOLUTION INTRAMUSCULAR; INTRAVENOUS AS NEEDED
Status: DISCONTINUED | OUTPATIENT
Start: 2025-04-01 | End: 2025-04-01

## 2025-04-01 RX ORDER — EPHEDRINE SULFATE 50 MG/ML
INJECTION INTRAVENOUS AS NEEDED
Status: DISCONTINUED | OUTPATIENT
Start: 2025-04-01 | End: 2025-04-01

## 2025-04-01 RX ADMIN — PHENYLEPHRINE HYDROCHLORIDE 100 MCG: 10 INJECTION INTRAVENOUS at 12:54

## 2025-04-01 RX ADMIN — FENTANYL CITRATE 25 MCG: 50 INJECTION INTRAMUSCULAR; INTRAVENOUS at 13:05

## 2025-04-01 RX ADMIN — MIDAZOLAM HYDROCHLORIDE 2 MG: 1 INJECTION, SOLUTION INTRAMUSCULAR; INTRAVENOUS at 12:30

## 2025-04-01 RX ADMIN — SODIUM CHLORIDE, SODIUM LACTATE, POTASSIUM CHLORIDE, AND CALCIUM CHLORIDE: .6; .31; .03; .02 INJECTION, SOLUTION INTRAVENOUS at 12:27

## 2025-04-01 RX ADMIN — EPHEDRINE SULFATE 10 MG: 50 INJECTION INTRAVENOUS at 13:18

## 2025-04-01 RX ADMIN — ROCURONIUM 50 MG: 50 INJECTION, SOLUTION INTRAVENOUS at 13:18

## 2025-04-01 RX ADMIN — SUGAMMADEX 200 MG: 100 INJECTION, SOLUTION INTRAVENOUS at 14:17

## 2025-04-01 RX ADMIN — DEXAMETHASONE SODIUM PHOSPHATE 10 MG: 10 INJECTION INTRAMUSCULAR; INTRAVENOUS at 12:37

## 2025-04-01 RX ADMIN — ONDANSETRON 4 MG: 2 INJECTION, SOLUTION INTRAMUSCULAR; INTRAVENOUS at 12:37

## 2025-04-01 RX ADMIN — SODIUM CHLORIDE, SODIUM LACTATE, POTASSIUM CHLORIDE, AND CALCIUM CHLORIDE: .6; .31; .03; .02 INJECTION, SOLUTION INTRAVENOUS at 13:16

## 2025-04-01 RX ADMIN — LIDOCAINE HYDROCHLORIDE 100 MG: 20 INJECTION, SOLUTION EPIDURAL; INFILTRATION; INTRACAUDAL at 12:37

## 2025-04-01 RX ADMIN — FENTANYL CITRATE 25 MCG: 50 INJECTION INTRAMUSCULAR; INTRAVENOUS at 12:41

## 2025-04-01 RX ADMIN — Medication 100 MG: at 13:13

## 2025-04-01 RX ADMIN — SODIUM CHLORIDE FOR IRRIGATION 3000 ML: 0.9 SOLUTION IRRIGATION at 18:01

## 2025-04-01 RX ADMIN — SODIUM CHLORIDE, SODIUM LACTATE, POTASSIUM CHLORIDE, AND CALCIUM CHLORIDE: .6; .31; .03; .02 INJECTION, SOLUTION INTRAVENOUS at 13:21

## 2025-04-01 RX ADMIN — CEFAZOLIN SODIUM 2000 MG: 2 SOLUTION INTRAVENOUS at 12:31

## 2025-04-01 RX ADMIN — SODIUM CHLORIDE, SODIUM LACTATE, POTASSIUM CHLORIDE, AND CALCIUM CHLORIDE 50 ML/HR: .6; .31; .03; .02 INJECTION, SOLUTION INTRAVENOUS at 16:16

## 2025-04-01 RX ADMIN — PROPOFOL 50 MG: 10 INJECTION, EMULSION INTRAVENOUS at 12:41

## 2025-04-01 RX ADMIN — NITROFURANTOIN MONOHYDRATE/MACROCRYSTALLINE 100 MG: 25; 75 CAPSULE ORAL at 18:00

## 2025-04-01 RX ADMIN — FENTANYL CITRATE 25 MCG: 50 INJECTION INTRAMUSCULAR; INTRAVENOUS at 13:12

## 2025-04-01 RX ADMIN — HEPARIN SODIUM 5000 UNITS: 5000 INJECTION INTRAVENOUS; SUBCUTANEOUS at 18:00

## 2025-04-01 RX ADMIN — DEXMEDETOMIDINE 12 MCG: 100 INJECTION, SOLUTION INTRAVENOUS at 12:30

## 2025-04-01 RX ADMIN — FENTANYL CITRATE 25 MCG: 50 INJECTION INTRAMUSCULAR; INTRAVENOUS at 12:46

## 2025-04-01 RX ADMIN — PROPOFOL 150 MG: 10 INJECTION, EMULSION INTRAVENOUS at 12:37

## 2025-04-01 NOTE — ANESTHESIA POSTPROCEDURE EVALUATION
Post-Op Assessment Note    CV Status:  Stable  Pain Score: 2    Pain management: adequate       Mental Status:  Alert and awake   Hydration Status:  Euvolemic   PONV Controlled:  Controlled   Airway Patency:  Patent     Post Op Vitals Reviewed: Yes    No anethesia notable event occurred.    Staff: Anesthesiologist           Last Filed PACU Vitals:  Vitals Value Taken Time   Temp 97.5 °F (36.4 °C) 04/01/25 1515   Pulse 74 04/01/25 1518   /73 04/01/25 1515   Resp 18 04/01/25 1518   SpO2 90 % 04/01/25 1518   Vitals shown include unfiled device data.    Modified Vicente:     Vitals Value Taken Time   Activity 2 04/01/25 1515   Respiration 2 04/01/25 1515   Circulation 2 04/01/25 1515   Consciousness 2 04/01/25 1515   Oxygen Saturation 1 04/01/25 1515     Modified Vicente Score: 9

## 2025-04-01 NOTE — ANESTHESIA PREPROCEDURE EVALUATION
Procedure:  LASER ENUCLEATION PROSTATE W MORCELLATION (Abdomen)    Had lap srinivasa in nov last year and did well   Relevant Problems   CARDIO   (+) Hyperlipidemia   (+) Hypertension   (+) Mixed hyperlipidemia      ENDO   (+) Hypothyroid      GI/HEPATIC   (+) Gastroesophageal reflux disease      /RENAL   (+) ERIKA (acute kidney injury) (HCC)   (+) Benign prostatic hyperplasia without urinary obstruction   (+) Hydroureteronephrosis      MUSCULOSKELETAL   (+) Generalized osteoarthritis      NEURO/PSYCH   (+) Post-traumatic stress disorder, unspecified   (+) Posttraumatic stress disorder        Physical Exam    Airway    Mallampati score: II  TM Distance: >3 FB  Neck ROM: full     Dental    upper dentures    Cardiovascular  Cardiovascular exam normal    Pulmonary  Pulmonary exam normal     Other Findings        Anesthesia Plan  ASA Score- 3     Anesthesia Type- general with ASA Monitors.         Additional Monitors:     Airway Plan:            Plan Factors-Exercise tolerance (METS): >4 METS.    Chart reviewed. EKG reviewed.  Existing labs reviewed. Patient summary reviewed.    Patient is not a current smoker. Patient not instructed to abstain from smoking on day of procedure. Patient did not smoke on day of surgery.            Induction- intravenous.    Postoperative Plan- Plan for postoperative opioid use.     Perioperative Resuscitation Plan - Level 1 - Full Code.       Informed Consent- Anesthetic plan and risks discussed with patient.  I personally reviewed this patient with the CRNA. Discussed and agreed on the Anesthesia Plan with the CRNA..      NPO Status:  No vitals data found for the desired time range.        Lab Results   Component Value Date    HGBA1C 5.8 (H) 03/18/2025       Lab Results   Component Value Date    K 3.8 03/18/2025     03/18/2025    CO2 25 03/18/2025    BUN 17 03/18/2025    CREATININE 1.18 03/18/2025    GLUF 109 (H) 03/18/2025    CALCIUM 9.6 03/18/2025    CORRECTEDCA 8.6 11/22/2024    AST  16 03/18/2025    ALT 16 03/18/2025    ALKPHOS 77 03/18/2025    EGFR 60 03/18/2025       Lab Results   Component Value Date    WBC 8.28 03/18/2025    HGB 15.6 03/18/2025    HCT 49.1 03/18/2025    MCV 83 03/18/2025     03/18/2025     Normal sinus rhythm  Nonspecific T wave abnormality  Confirmed by Marcos Jaramillo ([id number]) on 3/18/2025 1:16:13 PM     Specimen Collected: 03/18/25 11:57

## 2025-04-01 NOTE — OP NOTE
OPERATIVE REPORT  PATIENT NAME: Melchor Adrian    :  1950  MRN: [id number]  Pt Location: AN OR ROOM 01    SURGERY DATE: 2025    Surgeons and Role:     * Dwight Sanchez DO - Primary    Preop Diagnosis:  BPH with obstruction/lower urinary tract symptoms [N40.1, N13.8]    Post-Op Diagnosis Codes:     * BPH with obstruction/lower urinary tract symptoms [N40.1, N13.8]    Procedure(s):  LASER ENUCLEATION PROSTATE W MORCELLATION    Specimen(s):  ID Type Source Tests Collected by Time Destination   1 : PROSTATE CHIPS Tissue Prostate TISSUE EXAM Dwight Sanchez DO 2025 1024        Estimated Blood Loss:   Minimal    Drains:  Urethral Catheter Latex 16 Fr. (Active)   Number of days: 78       Urethral Catheter Latex;Coude;Three way;Other (Comment) 22 Fr. (Active)   Number of days: 0       Anesthesia Type:   General    Operative Indications:  BPH with obstruction/lower urinary tract symptoms [N40.1, N13.8]      74 y.o. old male with urinary retention     Denied cardiac issues     No anticoagulation     Had neg GH campbell around 2019 with outside urologist     Presented to ED on 2025 with urinary retention.  Arauz catheter was placed for 1100 cc.  Was started on Flomax and finasteride during admission.  Urine was not infected.     CTAP w 24: prostate measured at 4.52 x 4.82 x 6.35 cm, 71.94 g     Failed trial of void multiple times in 2024     Cysto 25:  Urethra:                      Normal  Prostate:                    Bilateral lateral lobe hypertrophy with kissing lobes, mild to moderate median lobe with some intravesical component, no lesions  Bladder:                     Severe trabeculations; catheter cystitis changes throughout the bladder; no stones  Ureteral orifices:       orthotopic  Other findings:          None, retroflexed view confirms           Consented for HoLEP        Operative Findings:          No meatal stenosis  No urethral strictures    Bilateral ureteral  "orifices in orthotopic positions  Lesions: None  Trabeculations: Severe  Multiple cellules  Stones: None    Prostate: Enlarged lateral lobes with kissing lobes.  Moderate sized median lobe with some intravesical component.    Standard 3 lobe technique HoLEP performed    Nice open channel at the end of the procedure    Bilateral ureteral orifices identified and the procedure and were confirmed to be unharmed    22 Beninese three-way hematuria catheter placed at end of case.  30 in balloon.  CBI started.            Complications:   None    Procedure and Technique:              Patient identified in the preop holding area.  Consent was obtained.  Risks and benefits of the procedure were explained to the patient.  Patient was in agreement.  Patient was brought back to the OR and placed supine on the table.  Bilateral lower extremity SCDs were placed and turned on.  Patient underwent smooth induction of general anesthesia.  IV Ancef was administered for antibiotics.  Bilateral lower extremities were placed in stirrups.  Patient was in dorsolithotomy position.  Area was prepped and draped in sterile fashion.  Timeout was performed by the OR team.     A 24 Beninese cystoscope with 30 degree lens was introduced into the urethra and bladder.  Pan cystourethroscopy was performed.  See the above findings for details.     The visual  was exchanged for a laser bridge element.     Using a 550 micron fiber and laser settings of 2.0J and 40Hz with virtual basketing for enculeation and 1.5J and 30Hz for coagulation ablation was initiated at the right sulcus at the 5:00 and 7:00 position working toward the veramontanum.   A \"U\" shaped incision was then made around the verumontanum beginning to undermine the median and posterior medial lateral lobes.     The median lobe was then dissected out with laser energy and eventually passed into the bladder.       Thereafter attention was turned to the right lobe of prostate which was " enucleated by first working posterior along the floor of the prostate towards the lateral wall.  Then a new incision was made at the 12:00 o'clock location and the plane was extended towards the patient's left anterior side.  Any localized bleeders were cauterized.  The 2 dissection planes were made to meet each other on the lateral aspect of the wall.  Care was taken at the apex of the prostate to minimize energy during enucleation.   Eventually with additional circumferential enucleation the right lateral lobe was flipped into the bladder and enucleation was completed by cutting small residual tissue at the bladder neck with care taken to avoid injury to the nearby ureteral orifice.     Attention was was then turned to the contralateral (left) side.  In a similar fashion the enucleation was started with creation of a plane at the posterior aspect of the prostate working towards the apex and laterally.  We then moved to the anterior aspect and worked above and around the prostate anteriorly.   As the enucleation progressed we were able to flip the lobe into the bladder and finish enucleation along the bladder neck freeing the lobe.       Attention was now turned towards the prostatic fossa.  There was no significant adenoma left behind but there were a few areas of small adenoma which were treated with laser to smooth out the fossa.  Attention was also turned to localized bleeders which were treated with treated with laser.     Attention was now turned towards morcellation.  An offset nephroscope was introduced into the outer sheath with 2 inflows hooked up.  The morcellation probe was introduced through the nephroscope and the adenoma was engaged and morcellated.  After morcellation was complete the bladder and prostatic fossa were re-evaluated to ensure there was no residual floating adenoma pieces.     Bladder was reinspected and ureteral orifices were unharmed.  No mucosal bladder injuries were noted. Scope was  removed from bladder and urethra. 22 Citizen of [place] three-way hematuria Arauz catheter was placed with return of clear urine.  30 cc were placed in the balloon.  CBI was initiated.      Patient was uneventfully awoken from anesthesia and extubated.  He was brought to PACU in good condition.                 A qualified resident physician was not available.    Patient Disposition:  PACU              SIGNATURE: Dwight Cruzrosalba,   DATE: April 1, 2025  TIME: 2:24 PM        Plan  - Admit overnight for observation for CBI  - PACU labs  - 22 Citizen of [place] three-way hematuria catheter.  30 cc in balloon.  On CBI.  Plan for clamp trial on the morning of 4/2/2025.  If he passes clamp trial, can be discharged home with Arauz catheter.  - Will arrange for trial of void in the office later this week if possible  - Macrobid x 3 days  - Stool softener

## 2025-04-01 NOTE — TELEPHONE ENCOUNTER
Patient status post HoLEP at [place] on 4/1/2025    22 [place] three-way hematuria catheter.  30 cc in balloon.  On CBI.    Will likely be discharged tomorrow with Arauz catheter in place    Plan on office trial of void at the end of the week, preferably Friday if possible.

## 2025-04-01 NOTE — RESPIRATORY THERAPY NOTE
RT Protocol Note  Melchor Adrian 74 y.o. male MRN: [id number]  Unit/Bed#: S -01 Encounter: [id number]    Assessment    Principal Problem:    BPH with obstruction/lower urinary tract symptoms      Home Pulmonary Medications:  None     Past Medical History:   Diagnosis Date    Basal cell carcinoma     Left Upper back    GERD (gastroesophageal reflux disease)     Hyperlipidemia     Hypertension     PTSD (post-traumatic stress disorder)     SIRS (systemic inflammatory response syndrome) ([facility]) 11/22/2024    Urinary retention 11/22/2024     Social History     Socioeconomic History    Marital status:      Spouse name: None    Number of children: None    Years of education: None    Highest education level: None   Occupational History    None   Tobacco Use    Smoking status: Former     Passive exposure: Past    Smokeless tobacco: Never   Vaping Use    Vaping status: Never Used   Substance and Sexual Activity    Alcohol use: Not Currently    Drug use: Never    Sexual activity: Not Currently   Other Topics Concern    None   Social History Narrative    None     Social Drivers of Health     Financial Resource Strain: Not on file   Food Insecurity: No Food Insecurity (4/1/2025)    Nursing - Inadequate Food Risk Classification     Worried About Running Out of Food in the Last Year: Never true     Ran Out of Food in the Last Year: Never true     Ran Out of Food in the Last Year: Never true   Transportation Needs: No Transportation Needs (4/1/2025)    Nursing - Transportation Risk Classification     Lack of Transportation: Not on file     Lack of Transportation: No   Physical Activity: Not on file   Stress: Not on file   Social Connections: Not on file   Intimate Partner Violence: Unknown (4/1/2025)    Nursing IPS     Feels Physically and Emotionally Safe: Not on file     Physically Hurt by Someone: Not on file     Humiliated or Emotionally Abused by Someone: Not on file     Physically Hurt by Someone: No     Hurt or  "Threatened by Someone: No   Housing Stability: Unknown (2025)    Nursing: Inadequate Housing Risk Classification     Has Housing: Not on file     Worried About Losing Housing: Not on file     Unable to Get Utilities: Not on file     Unable to Pay for Housing in the Last Year: No     Has Housin       Subjective         Objective    Physical Exam:   Assessment Type: Assess only  General Appearance: Alert, Awake  Respiratory Pattern: Normal  Chest Assessment: Chest expansion symmetrical  Bilateral Breath Sounds: Clear  Cough: None  O2 Device: None    Vitals:  Blood pressure 131/88, pulse 97, temperature 97.5 °F (36.4 °C), resp. rate 18, height 5' 7\" (1.702 m), weight 96.6 kg (213 lb), SpO2 94%.          Imaging and other studies:     O2 Device: None     Plan    Respiratory Plan: No distress/Pulmonary history, Discontinue Protocol            "

## 2025-04-01 NOTE — ANESTHESIA POSTPROCEDURE EVALUATION
Post-Op Assessment Note    CV Status:  Stable  Pain Score: 0    Pain management: adequate       Mental Status:  Awake and alert   Hydration Status:  Stable   PONV Controlled:  None   Airway Patency:  Patent and adequate     Post Op Vitals Reviewed: Yes    No anethesia notable event occurred.    Staff: CRNA, Anesthesiologist           Last Filed PACU Vitals:  Vitals Value Taken Time   Temp 97    Pulse 76 04/01/25 1435   /74 04/01/25 1434   Resp 18    SpO2 98 % 04/01/25 1435   Vitals shown include unfiled device data.

## 2025-04-01 NOTE — H&P
UROLOGY H&P NOTE     Patient Identifiers: Melchor Adrian (MRN [id number])    Date of Service: 4/1/2025    History of Present Illness:     Melchor Adrian is a 74 y.o. old with a history of urinary retention    Past Medical, Past Surgical History:     Past Medical History:   Diagnosis Date    Basal cell carcinoma     Left Upper back    GERD (gastroesophageal reflux disease)     Hyperlipidemia     Hypertension     PTSD (post-traumatic stress disorder)     SIRS (systemic inflammatory response syndrome) (HCC) 11/22/2024    Urinary retention 11/22/2024   :    Past Surgical History:   Procedure Laterality Date    CHOLECYSTECTOMY LAPAROSCOPIC N/A 11/14/2024    Procedure: CHOLECYSTECTOMY LAPAROSCOPIC;  Surgeon: Law Lew DO;  Location: [facility];  Service: General    COLONOSCOPY      KNEE SURGERY      MASS EXCISION N/A 5/27/2020    Procedure: EXCISION BIOPSY TISSUE LESION/MASS RIGHT BUTTOCK;  Surgeon: Tez Ericskon MD;  Location: [facility];  Service: General   :    Medications, Allergies:     Current Facility-Administered Medications:     ceFAZolin (ANCEF) IVPB (premix in dextrose) 2,000 mg 50 mL, On Call To OR    lactated ringers infusion, Continuous    Allergies:  Allergies   Allergen Reactions    Atorvastatin Other (See Comments)    Simvastatin Other (See Comments)   :    Social and Family History:   Social History:   Social History     Tobacco Use    Smoking status: Former     Passive exposure: Past    Smokeless tobacco: Never   Vaping Use    Vaping status: Never Used   Substance Use Topics    Alcohol use: Not Currently    Drug use: Never   .    Social History     Tobacco Use   Smoking Status Former    Passive exposure: Past   Smokeless Tobacco Never       Family History:  Family History   Problem Relation Age of Onset    Heart disease Mother     Heart disease Father    :     Review of Systems:     General: Fever, chills, or night sweats: negative  Cardiac: Negative for chest pain.    Pulmonary: Negative for  "shortness of breath.  Gastrointestinal: Abdominal pain negative.  Nausea, vomiting, or diarrhea negative,  Genitourinary: See HPI above.  Patient does not have hematuria.  All other systems queried were negative.    Physical Exam:   General: Patient is pleasant and in NAD. Awake and alert  BP (!) 189/98   Pulse 94   Temp 97.8 °F (36.6 °C) (Temporal)   Resp 18   Ht 5' 7\" (1.702 m)   Wt 96.6 kg (213 lb)   SpO2 93%   BMI 33.36 kg/m² Temp (24hrs), Av.8 °F (36.6 °C), Min:97.8 °F (36.6 °C), Max:97.8 °F (36.6 °C)  current; Temperature: 97.8 °F (36.6 °C)  No intake/output data recorded.  Cardiac: Peripheral edema: negative  Pulmonary: Non-labored breathing  Abdomen: Soft, non-tender, non-distended.  No surgical scars.  No masses, tenderness, hernias noted.    Genitourinary: Negative CVA tenderness, negative suprapubic tenderness.      Arauz in place draining urine        Labs:     Lab Results   Component Value Date    HGB 15.6 2025    HCT 49.1 2025    WBC 8.28 2025     2025   ]    Lab Results   Component Value Date    K 3.8 2025     2025    CO2 25 2025    BUN 17 2025    CREATININE 1.18 2025    CALCIUM 9.6 2025   ]    Imaging:     Any pertinent imaging was personally reviewed and discussed with patient. See below for details.    ASSESSMENT:     74 y.o. old male with urinary retention     Denied cardiac issues     No anticoagulation     Had neg GH campbell around 2019 with outside urologist     Presented to ED on 2025 with urinary retention.  Arauz catheter was placed for 1100 cc.  Was started on Flomax and finasteride during admission.  Urine was not infected.     CTAP w 24: prostate measured at 4.52 x 4.82 x 6.35 cm, 71.94 g     Failed trial of void multiple times in 2024    Cysto 25:  Urethra:                      Normal  Prostate:                    Bilateral lateral lobe hypertrophy with kissing lobes, mild to moderate " median lobe with some intravesical component, no lesions  Bladder:                     Severe trabeculations; catheter cystitis changes throughout the bladder; no stones  Ureteral orifices:       orthotopic  Other findings:          None, retroflexed view confirms        Consented for HoLEP    Patient is interested in proceeding with Holmium laser enucleation based on our discussion today.      Laser enucleation of the prostate offers a completely endoscopic approach (no incisions) to definitively remove prostate tissue obstructing the bladder with lower risk of bleeding complications compared to other endoscopic procedures, which is why I commonly offer Laser enucleation of the prostate.  Additional advantages of laser enucleation of the prostate include, very low risk of re-treatment over time, shorter hospital stay and in some cases no hospital stay required, shorter indwelling Arauz catheter times, and the safety of this treatment modality in complex patients on blood thinning medication (ex. Eliquis, Xarelto, Coumadin).      I explained a HoLEP is a surgery performed with special equipment through the urethra to remove obstructing prostate tissue that is causing urinary symptoms.  Usually a Arauz catheter will be placed for less than 24 hours after surgery. Some patients may require hospitalization overnight with continuous bladder irrigation through the Arauz catheter.  It is common to have irritative voiding symptoms such as urinary urgency/frequency/nocturia for several weeks following a HoLEP; it is also common to see blood in the urine during this time period. There is a risk of retrograde ejaculation in up to 75% of patients after HoLEP. The risk of erectile dysfunction is rare following a HoLEP. The risk of transient stress urinary incontinence lasting 3-6 months following surgery is between 10-30% based on the literature.  The risk of long term urinary incontinence is rare, 1-2%.  Additional side  effects that we discussed today include urethral stricture and bladder neck contracture, which occurs in 1-2% of patients. The risk of needing to make a cystotomy or a bladder incision to retrieve the enucleated tissue in the event that the morcellator does not work was discussed. Other reasons why a cystotomy would have to be made include bladder injury from irrigation fluid used during the procedure or bladder injury from placement of a Arauz catheter.     I also explained to the patient that proper voiding requires open bladder outlet as well as proper underlying bladder function. He understands that even if we create a nice opening in the prostate after surgery, he still may not ideally void due to poor bladder function. He does understand that sometimes underlying bladder voiding mechanics can improve once a proper outlet opening is created, however, he also understands that this is not always the case. In summary the patient understands that even despite a proper HoLEP surgery, voiding issues may remain after the procedure.    We also discussed the possibility of finding prostate cancer pathology in the HoLEP specimen. I explained that all of the prostate tissue removed during the procedure gets examined by the pathology team, and there have been instances where prostate cancer is identified. I explained that while incidentally found prostate cancer is rare, it is usually low risk prostate cancer that can typically be monitored rather than aggressively treated. Should the patient have intermediate or high risk prostate cancer identified, he would have the risk based treatment options reviewed with him. I explained that in these scenarios, men are often treated with radiation.    The patient understands the risk and benefits of a HoLEP and would like to proceed with surgery.      I took time to answer any questions he had regarding the procedure.        PLAN:     Preop ancef  OR plan as above  Obs post op

## 2025-04-02 VITALS
OXYGEN SATURATION: 95 % | WEIGHT: 213 LBS | DIASTOLIC BLOOD PRESSURE: 85 MMHG | TEMPERATURE: 97.3 F | HEIGHT: 67 IN | RESPIRATION RATE: 16 BRPM | SYSTOLIC BLOOD PRESSURE: 139 MMHG | HEART RATE: 76 BPM | BODY MASS INDEX: 33.43 KG/M2

## 2025-04-02 PROCEDURE — 99024 POSTOP FOLLOW-UP VISIT: CPT | Performed by: UROLOGY

## 2025-04-02 PROCEDURE — NC001 PR NO CHARGE

## 2025-04-02 RX ORDER — ACETAMINOPHEN 325 MG/1
650 TABLET ORAL EVERY 6 HOURS PRN
Start: 2025-04-02

## 2025-04-02 RX ORDER — NITROFURANTOIN 25; 75 MG/1; MG/1
100 CAPSULE ORAL 2 TIMES DAILY WITH MEALS
Qty: 6 CAPSULE | Refills: 0 | Status: SHIPPED | OUTPATIENT
Start: 2025-04-02 | End: 2025-04-05

## 2025-04-02 RX ORDER — DOCUSATE SODIUM 100 MG/1
100 CAPSULE, LIQUID FILLED ORAL 2 TIMES DAILY
Qty: 90 CAPSULE | Refills: 0 | Status: SHIPPED | OUTPATIENT
Start: 2025-04-02 | End: 2025-05-17

## 2025-04-02 RX ADMIN — HEPARIN SODIUM 5000 UNITS: 5000 INJECTION INTRAVENOUS; SUBCUTANEOUS at 09:49

## 2025-04-02 RX ADMIN — AMLODIPINE BESYLATE 5 MG: 5 TABLET ORAL at 08:00

## 2025-04-02 RX ADMIN — LEVOTHYROXINE SODIUM 37.5 MCG: 0.07 TABLET ORAL at 05:48

## 2025-04-02 RX ADMIN — PANTOPRAZOLE SODIUM 40 MG: 40 TABLET, DELAYED RELEASE ORAL at 05:48

## 2025-04-02 RX ADMIN — HEPARIN SODIUM 5000 UNITS: 5000 INJECTION INTRAVENOUS; SUBCUTANEOUS at 02:33

## 2025-04-02 RX ADMIN — FINASTERIDE 5 MG: 5 TABLET, FILM COATED ORAL at 08:00

## 2025-04-02 RX ADMIN — FLUOXETINE HYDROCHLORIDE 20 MG: 20 CAPSULE ORAL at 08:02

## 2025-04-02 RX ADMIN — NITROFURANTOIN MONOHYDRATE/MACROCRYSTALLINE 100 MG: 25; 75 CAPSULE ORAL at 08:02

## 2025-04-02 NOTE — TELEPHONE ENCOUNTER
Patient was retuning a call to the office. He confirmed the change from Today's appt to Monday. He will be there at 8:30 on Monday 4/7 in [place]

## 2025-04-02 NOTE — DISCHARGE SUMMARY
Discharge Summary - Urology   Name: Melchor Adrian 74 y.o. male I MRN: [id number]  Unit/Bed#: S -01 I Date of Admission: 4/1/2025   Date of Service: 4/2/2025 I Hospital Day: 0    Discharge Summary - Melchor Adrian 74 y.o. male MRN: [id number]    Unit/Bed#: S -01 Encounter: [id number]    Admission Date: 4/1/2025     Discharge Date: 04/02/25    HPI: Melchor Adrian is a 74 y.o. male who presented for HoLEP by Dr. Esparza     Procedure(s):  LASER ENUCLEATION PROSTATE W MORCELLATION  Surgeon(s):  Dwight Sanchez DO  4/1/2025    Hospital Course: Patient was admitted to urology service overnight.  He was on CBI overnight, clamped in the morning.  His urine remains clear yellow in afternoon.  His postop labs were stable.  Vital signs stable, afebrile.  Abdomen remains soft.  He denies any pain.  He is stable for discharge on the afternoon of 4/2/2025    Discharge Diagnosis: BPH with obstruction/lower urinary tract symptoms    Condition at Discharge: good    Discharge Medications:  See after visit summary for reconciled discharge medications provided to patient and family.  Patient was discharged home on home medications with the addition of Macrobid, Colace, Tylenol.     Discharge instructions/Information to patient and family:   See after visit summary for written and verbal information which has been provided to patient and family.      Provisions for Follow-Up Care:  See after visit summary for information related to follow-up care and any pertinent home health orders.      Disposition: Home    Planned Readmission: No    Discharge Statement   I spent 10 minutes discharging the patient. This time was spent on the day of discharge. I had direct contact with the patient on the day of discharge. Additional documentation is required if more than 30 minutes were spent on discharge.     Signature:   Stephanie Smith PA-C  Date: 4/2/2025 Time: 1:03 PM

## 2025-04-02 NOTE — CASE MANAGEMENT
Case Management Discharge Planning Note    Patient name Melchor Adrian  Location S /S -01 MRN [id number]  : 1950 Date 2025       Current Admission Date: 2025  Current Admission Diagnosis:BPH with obstruction/lower urinary tract symptoms   Patient Active Problem List    Diagnosis Date Noted Date Diagnosed    BPH with obstruction/lower urinary tract symptoms 2025     Benign prostatic hyperplasia without urinary obstruction 2025     Generalized osteoarthritis 2025     Hypermetropia 2025     Insomnia 2025     Cholecystitis with gangrene of gallbladder 2024     Hydroureteronephrosis 2024     ERIKA (acute kidney injury) (HCC) 2024     Lower abdominal pain 2024     Leukocytosis 2024     Positive blood culture 2024     Hypertension 11/15/2024     Gastroesophageal reflux disease 11/15/2024     Hyperlipidemia 11/15/2024     Posttraumatic stress disorder 11/15/2024     Mixed hyperlipidemia 11/15/2024     Post-traumatic stress disorder, unspecified 11/15/2024     Hypothyroid 11/15/2024     Sebaceous cyst 2020     Soft tissue mass 2020       LOS (days): 0  Geometric Mean LOS (GMLOS) (days):   Days to GMLOS:     OBJECTIVE:            Current admission status: Outpatient Surgery   Preferred Pharmacy:   [id number] - [place] - [address] [place] [id number]  Phone: [phone] Fax: [phone]    [id number] - [place] - [address] [facility]  [address] [facility] [address]  Phone: [phone] Fax: [phone]    [facility]   [address] [address]  Phone: [phone] Fax: [phone]    Primary Care Provider: Brittni Yoon MD    Primary Insurance: [facility]  Secondary Insurance:     DISCHARGE DETAILS:    Discharge planning discussed with:: Patient  Freedom  of Choice: Yes  Comments - Freedom of Choice: Discussed need for Lyft home  CM contacted family/caregiver?: No- see comments  Were Treatment Team discharge recommendations reviewed with patient/caregiver?: Yes  Did patient/caregiver verbalize understanding of patient care needs?: Yes  Were patient/caregiver advised of the risks associated with not following Treatment Team discharge recommendations?: Yes    Contacts  Patient Contacts: Patient  Relationship to Patient:: Other (Comment)  Contact Method: In Person  Reason/Outcome: Discharge Planning, Continuity of Care    Requested Home Health Care         Is the patient interested in HHC at discharge?: No (Patient reports being open with [facility] - he was an outpatient surgery so no [facility] referral is needed)    DME Referral Provided  Referral made for DME?: No    Other Referral/Resources/Interventions Provided:  Interventions: Transportation, HHC         Treatment Team Recommendation: Home with Home Health Care  Discharge Destination Plan:: Home with Home Health Care  Transport at Discharge : Ride Share     Number/Name of Dispatcher: Requested via Roundtrip     ETA of Transport (Date): 04/02/25  ETA of Transport (Time): 1430                          CM was notified that patient is medically stable for DC home today.  Patient is requesting assistance with transportation home.  Confirmed with provider and primary nurse that patient is appropriate for a Lyft.    Lyft was requested for 2;30 PM    Patient mentioned to primary nurse that he has [facility] set up already.  CM advised he would just need to call them and tell them that he is home.  Patient was not admitted and was just here as an outpatient surgery so no [facility] referral needs to be made.    CM reviewed the availability of treatment team to discuss questions or concerns patient and/or family may have regarding  understanding medications and recognizing signs and symptoms once discharged.  CM also encouraged patient to  follow up with all recommended appointments after discharge. Patient advised of importance for patient and family to participate in managing patient's medical well being.

## 2025-04-02 NOTE — PLAN OF CARE
Problem: PAIN - ADULT  Goal: Verbalizes/displays adequate comfort level or baseline comfort level  Description: Interventions:- Encourage patient to monitor pain and request assistance- Assess pain using appropriate pain scale- Administer analgesics based on type and severity of pain and evaluate response- Implement non-pharmacological measures as appropriate and evaluate response- Consider cultural and social influences on pain and pain management- Notify physician/advanced practitioner if interventions unsuccessful or patient reports new pain  Outcome: Progressing     Problem: INFECTION - ADULT  Goal: Absence or prevention of progression during hospitalization  Description: INTERVENTIONS:- Assess and monitor for signs and symptoms of infection- Monitor lab/diagnostic results- Monitor all insertion sites, i.e. indwelling lines, tubes, and drains- Monitor endotracheal if appropriate and nasal secretions for changes in amount and color- [place] appropriate cooling/warming therapies per order- Administer medications as ordered- Instruct and encourage patient and family to use good hand hygiene technique- Identify and instruct in appropriate isolation precautions for identified infection/condition  Outcome: Progressing  Goal: Absence of fever/infection during neutropenic period  Description: INTERVENTIONS:- Monitor WBC  Outcome: Progressing     Problem: DISCHARGE PLANNING  Goal: Discharge to home or other facility with appropriate resources  Description: INTERVENTIONS:- Identify barriers to discharge w/patient and caregiver- Arrange for needed discharge resources and transportation as appropriate- Identify discharge learning needs (meds, wound care, etc.)- Arrange for interpretive services to assist at discharge as needed- Refer to Case Management Department for coordinating discharge planning if the patient needs post-hospital services based on physician/advanced practitioner order or complex needs related to  functional status, cognitive ability, or social support system  Outcome: Progressing

## 2025-04-02 NOTE — TELEPHONE ENCOUNTER
Patient has been scheduled for TOV post HoLEP on 4/3/25 at 8 am and 2pm at the [facility]

## 2025-04-02 NOTE — PROGRESS NOTES
"Progress Note - Urology   Name: Melchor Adrian 74 y.o. male I MRN: [id number]  Unit/Bed#: S -01 I Date of Admission: 4/1/2025   Date of Service: 4/2/2025 I Hospital Day: 0     Assessment & Plan  BPH with obstruction/lower urinary tract symptoms  POD 1 Holep by Dr. Daniel DIAZ, afebrile  Labs stable yesterday  CBI clamped this AM  Urine remains clear yellow  Stable for discharge  Macrobid x 3 days, Colace          Subjective:   HPI: Seen at bedside, feeling well.  CBI was clamped this morning.  His urine remains clear yellow.  He has mild bleeding around urethral meatus.  Discussed this is a normal finding.  We talked about normal postoperative course at home.  He reports he would not want a void trial tomorrow, would prefer Monday.    Review of Systems   Constitutional:  Negative for chills and fever.   Respiratory:  Negative for cough and shortness of breath.    Cardiovascular:  Negative for chest pain and palpitations.   Gastrointestinal:  Negative for abdominal pain and vomiting.   Genitourinary:  Negative for dysuria and hematuria.   Musculoskeletal:  Negative for arthralgias and back pain.   Skin:  Negative for color change and rash.   Neurological:  Negative for seizures and syncope.   All other systems reviewed and are negative.      Objective:    Vitals: Blood pressure 139/85, pulse 76, temperature (!) 97.3 °F (36.3 °C), resp. rate 16, height 5' 7\" (1.702 m), weight 96.6 kg (213 lb), SpO2 95%.,Body mass index is 33.36 kg/m².    Physical Exam  Constitutional:       General: He is not in acute distress.     Appearance: Normal appearance. He is normal weight. He is not ill-appearing or toxic-appearing.   HENT:      Head: Normocephalic and atraumatic.      Right Ear: External ear normal.      Left Ear: External ear normal.      Nose: Nose normal.      Mouth/Throat:      Mouth: Mucous membranes are moist.   Eyes:      General: No scleral icterus.     Conjunctiva/sclera: Conjunctivae normal. "   Cardiovascular:      Rate and Rhythm: Normal rate.      Pulses: Normal pulses.   Pulmonary:      Effort: Pulmonary effort is normal.   Abdominal:      General: There is no distension.      Tenderness: There is no abdominal tenderness. There is no guarding.      Comments: Arauz draining clear yellow urine, CBI clamped this morning   Neurological:      General: No focal deficit present.      Mental Status: He is alert and oriented to person, place, and time. Mental status is at baseline.   Psychiatric:         Mood and Affect: Mood normal.         Behavior: Behavior normal.         Thought Content: Thought content normal.         Judgment: Judgment normal.         Imaging:  CT ABDOMEN AND PELVIS WITH IV CONTRAST     INDICATION: Diffuse lower abdominal pain tenderness, constipation, recent cholecystectomy for perforated gallbladder. .     COMPARISON: None.     TECHNIQUE: CT examination of the abdomen and pelvis was performed. Multiplanar 2D reformatted images were created from the source data.     This examination, like all CT scans performed in the [facility], was performed utilizing techniques to minimize radiation dose exposure, including the use of iterative reconstruction and automated exposure control. Radiation dose length   product (DLP) for this visit: 924 mGy-cm     IV Contrast: 100 mL of iohexol (OMNIPAQUE)  Enteric Contrast: Not administered.     FINDINGS:     ABDOMEN     LOWER CHEST: No clinically significant abnormality in the visualized lower chest.     LIVER/BILIARY TREE: Unremarkable.     GALLBLADDER: Post cholecystectomy.     SPLEEN: Unremarkable.     PANCREAS: Unremarkable.     ADRENAL GLANDS: Unremarkable.     KIDNEYS/URETERS: Bilateral hydronephrosis. Bilateral perinephric fat stranding..     STOMACH AND BOWEL: Liquid stool throughout the colon     APPENDIX: No findings to suggest appendicitis.     ABDOMINOPELVIC CAVITY: No ascites. No pneumoperitoneum. No lymphadenopathy.      VESSELS: Unremarkable for patient's age.     PELVIS     REPRODUCTIVE ORGANS: Enlarged prostate.     URINARY BLADDER: Distended     ABDOMINAL WALL/INGUINAL REGIONS: Unremarkable.     BONES: No acute fracture or suspicious osseous lesion.     IMPRESSION:     Bilateral hydroureteronephrosis with bladder distention suggesting bladder outlet obstruction        Workstation performed: [id number]  Labs:  Recent Labs     04/01/25  1450   WBC 12.79*       Recent Labs     04/01/25  1334 04/01/25  1450   HGB 13.9 13.4     Recent Labs     04/01/25  1334 04/01/25  1450   HCT 41 41.2     Recent Labs     04/01/25  1450   CREATININE 1.05         History:    Past Medical History:   Diagnosis Date    Basal cell carcinoma     Left Upper back    GERD (gastroesophageal reflux disease)     Hyperlipidemia     Hypertension     PTSD (post-traumatic stress disorder)     SIRS (systemic inflammatory response syndrome) ([facility]) 11/22/2024    Urinary retention 11/22/2024     Social History     Socioeconomic History    Marital status:      Spouse name: None    Number of children: None    Years of education: None    Highest education level: None   Occupational History    None   Tobacco Use    Smoking status: Former     Passive exposure: Past    Smokeless tobacco: Never   Vaping Use    Vaping status: Never Used   Substance and Sexual Activity    Alcohol use: Not Currently    Drug use: Never    Sexual activity: Not Currently   Other Topics Concern    None   Social History Narrative    None     Social Drivers of Health     Financial Resource Strain: Not on file   Food Insecurity: No Food Insecurity (4/1/2025)    Nursing - Inadequate Food Risk Classification     Worried About Running Out of Food in the Last Year: Never true     Ran Out of Food in the Last Year: Never true     Ran Out of Food in the Last Year: Never true   Transportation Needs: No Transportation Needs (4/1/2025)    Nursing - Transportation Risk Classification     Lack of  Transportation: Not on file     Lack of Transportation: No   Physical Activity: Not on file   Stress: Not on file   Social Connections: Not on file   Intimate Partner Violence: Unknown (2025)    Nursing IPS     Feels Physically and Emotionally Safe: Not on file     Physically Hurt by Someone: Not on file     Humiliated or Emotionally Abused by Someone: Not on file     Physically Hurt by Someone: No     Hurt or Threatened by Someone: No   Housing Stability: Unknown (2025)    Nursing: Inadequate Housing Risk Classification     Has Housing: Not on file     Worried About Losing Housing: Not on file     Unable to Get Utilities: Not on file     Unable to Pay for Housing in the Last Year: No     Has Housin     Past Surgical History:   Procedure Laterality Date    CHOLECYSTECTOMY LAPAROSCOPIC N/A 2024    Procedure: CHOLECYSTECTOMY LAPAROSCOPIC;  Surgeon: Law Lew DO;  Location: [facility];  Service: General    COLONOSCOPY      KNEE SURGERY      MASS EXCISION N/A 2020    Procedure: EXCISION BIOPSY TISSUE LESION/MASS RIGHT BUTTOCK;  Surgeon: Tez Erickson MD;  Location: [facility];  Service: General    TX LASER ENUCLEATION PROSTATE W/MORCELLATION N/A 2025    Procedure: LASER ENUCLEATION PROSTATE W MORCELLATION;  Surgeon: Dwight Sanchez DO;  [facility];  Service: Urology     Family History   Problem Relation Age of Onset    Heart disease Mother     Heart disease Father        Stephanie Smith PA-C  Date: 2025 Time: 10:55 AM

## 2025-04-02 NOTE — ASSESSMENT & PLAN NOTE
POD 1 Holep by Dr. Daniel DIAZ, afebrile  Labs stable yesterday  CBI clamped this AM  Urine remains clear yellow  Stable for discharge  Macrobid x 3 days, Colace

## 2025-04-02 NOTE — DISCHARGE INSTR - AVS FIRST PAGE
Postoperative Instructions for Laser Enucleation of the Prostate    Diet    You may eat whatever you like after your surgery. Sometimes anesthesia can cause nausea, so it may be a good idea to avoid heavy foods for a few days after your procedure.    Arauz catheter      You are going home with a tube in the bladder called a Arauz catheter. This drains urine from your bladder out of the tube exiting from your penis.   Please make sure that the catheter is always secured to the leg. There should never be any tension or tugging on the catheter. Take care not to pull your catheter when rolling in bed or changing position.   You can have your catheter draining to a large bag or a smaller leg bag. Patients typically like to use the larger bag overnight so that they do not need to wake up in middle of night to empty the smaller leg bag.  The Arauz catheter has a balloon on the end of it to keep it from falling out of the bladder. This balloon may give you the feeling that you need to urinate. These sensations are called bladder spasms.  Bladder spasms may also cause you to urinate around the Arauz catheter. This is not abnormal and not an emergency as long as you see that additional urine is draining properly in the catheter tubing.  Please monitor the catheter periodically to make sure that it is draining. If the catheter stops draining, get up and walk around. If it is still not draining, call the office or come to the ER as the catheter may be clogged and need to be irrigated.  You may see blood in catheter tubing. This is normal. Please make sure that you drink plenty of fluids. Be sure to call office or present to ER if you notice large blood clots in your urine or if catheter is not draining as explained above.  Office will call you to arrange for Arauz catheter removal.  Once the Arauz catheter is removed, it is normal to have burning and stinging with urination for a few weeks after surgery. It is also common to  have more frequent urination and a greater sense of urge to urinate. There may not be much warning from the time you feel the urge to urinate to the time when bladder is ready to empty.      Activity    It is very important to walk after your procedure. Walking prevents blood clots in legs and lungs. You may go up and down stairs.  Avoid any strenuous activity or lifting more than ten pounds until your follow up appointment about 4 weeks after surgery. This includes any heavy lifting, running, riding a bicycle, or golf. This also includes activities such as raking leaves, mowing the lawn, shoveling snow, or other strenuous chores.  If you see blood in your urine, increase the amount of water you are drinking and avoid strenuous activity until the blood clears.  You may shower the day after your surgery. If you have a catheter in place, please be careful not to have the catheter tugged while you are bathing. With the catheter in place, you may notice some clot or residue on the part of the tube coming out of the penis. You can gently clean this with soap and water as needed.  It is typically best to avoid baths, hot tubs, pools, or any body of standing water while you have Arauz catheter in place. If there is no Arauz catheter in place, you can resume showering/bathing/swimming as usual.    Medications    Take the medications prescribed at the time of your discharge from the hospital.  Colace: This is a stool softener. Please take this as instructed to ensure softer bowel movements so that you are not straining. Straining during bowel movements can lead to increased prostate bleeding. In addition to taking your stool softener, be sure to eat plenty of fiber and to drink plenty of water.  Macrobid/Augmentin: This is an antibiotic. Please take this as instructed for 3 days.  For any aches or headaches you may use over the counter Tylenol or Extra Strength Tylenol    Follow up plan    If you are going home with a Arauz  catheter (tube coming out of the penis that drains your bladder), the office will call you to schedule you for Arauz catheter removal a few days after your surgery.  If you are going home without a Arauz catheter, you will be scheduled for a follow up visit about 4 weeks after your procedure. You are allowed to drive to this visit.    Return precautions    Please call the office or present to ER if you experience concerning symptoms including but not limited to: fever greater than 100.4 degrees Fahrenheit, chills, nausea, vomiting, passage of large blood clots in urine, inability to urinate (or lack of urine draining from Arauz catheter if you have one in place), abdominal/flank pain.

## 2025-04-02 NOTE — PLAN OF CARE
Problem: PAIN - ADULT  Goal: Verbalizes/displays adequate comfort level or baseline comfort level  Description: Interventions:- Encourage patient to monitor pain and request assistance- Assess pain using appropriate pain scale- Administer analgesics based on type and severity of pain and evaluate response- Implement non-pharmacological measures as appropriate and evaluate response- Consider cultural and social influences on pain and pain management- Notify physician/advanced practitioner if interventions unsuccessful or patient reports new pain  Reactivated     Problem: INFECTION - ADULT  Goal: Absence or prevention of progression during hospitalization  Description: INTERVENTIONS:- Assess and monitor for signs and symptoms of infection- Monitor lab/diagnostic results- Monitor all insertion sites, i.e. indwelling lines, tubes, and drains- Monitor endotracheal if appropriate and nasal secretions for changes in amount and color- [place] appropriate cooling/warming therapies per order- Administer medications as ordered- Instruct and encourage patient and family to use good hand hygiene technique- Identify and instruct in appropriate isolation precautions for identified infection/condition  Reactivated  Goal: Absence of fever/infection during neutropenic period  Description: INTERVENTIONS:- Monitor WBC  Reactivated     Problem: SAFETY ADULT  Goal: Patient will remain free of falls  Description: INTERVENTIONS:- Educate patient/family on patient safety including physical limitations- Instruct patient to call for assistance with activity - Consult OT/PT to assist with strengthening/mobility - Keep Call bell within reach- Keep bed low and locked with side rails adjusted as appropriate- Keep care items and personal belongings within reach- Initiate and maintain comfort rounds- Make Fall Risk Sign visible to staff- Offer Toileting every 2 Hours, in advance of need- Initiate/Maintain bed alarm- Obtain necessary fall risk  management equipment- Apply yellow socks and bracelet for high fall risk patients- Consider moving patient to room near nurses station  Reactivated  Goal: Maintain or return to baseline ADL function  Description: INTERVENTIONS:-  Assess patient's ability to carry out ADLs; assess patient's baseline for ADL function and identify physical deficits which impact ability to perform ADLs (bathing, care of mouth/teeth, toileting, grooming, dressing, etc.)- Assess/evaluate cause of self-care deficits - Assess range of motion- Assess patient's mobility; develop plan if impaired- Assess patient's need for assistive devices and provide as appropriate- Encourage maximum independence but intervene and supervise when necessary- Involve family in performance of ADLs- Assess for home care needs following discharge - Consider OT consult to assist with ADL evaluation and planning for discharge- Provide patient education as appropriate  Reactivated  Goal: Maintains/Returns to pre admission functional level  Description: INTERVENTIONS:- Perform AM-PAC 6 Click Basic Mobility/ Daily Activity assessment daily.- Set and communicate daily mobility goal to care team and patient/family/caregiver. - Collaborate with rehabilitation services on mobility goals if consulted- Perform Range of Motion 2 times a day.- Reposition patient every 2 hours.- Dangle patient 2 times a day- Stand patient 3 times a day- Ambulate patient 3 times a day- Out of bed to chair 3 times a day - Out of bed for meals 3 times a day- Out of bed for toileting- Record patient progress and toleration of activity level   Reactivated     Problem: DISCHARGE PLANNING  Goal: Discharge to home or other facility with appropriate resources  Description: INTERVENTIONS:- Identify barriers to discharge w/patient and caregiver- Arrange for needed discharge resources and transportation as appropriate- Identify discharge learning needs (meds, wound care, etc.)- Arrange for interpretive  services to assist at discharge as needed- Refer to Case Management Department for coordinating discharge planning if the patient needs post-hospital services based on physician/advanced practitioner order or complex needs related to functional status, cognitive ability, or social support system  Reactivated     Problem: Knowledge Deficit  Goal: Patient/family/caregiver demonstrates understanding of disease process, treatment plan, medications, and discharge instructions  Description: Complete learning assessment and assess knowledge base.Interventions:- Provide teaching at level of understanding- Provide teaching via preferred learning methods  Reactivated

## 2025-04-02 NOTE — TELEPHONE ENCOUNTER
Patient seen inpatient.  He reports he would not want a void trial tomorrow.    Can you look at [facility] schedule, possible void trial Monday?

## 2025-04-02 NOTE — TELEPHONE ENCOUNTER
Although patient remains admitted and is expected to be d/c today, I did call patient on his home number to make him aware of scheduled TOV. Detailed voicemail left providing date, time and office phone number for any further questions or concerns.     If patient calls back, please confirm appointment. Thank you!     My Note Signed 8:37 AM       Patient has been scheduled for TOV post HoLEP on 4/3/25 at 8 am and 2pm at the [facility]

## 2025-04-07 ENCOUNTER — PROCEDURE VISIT (OUTPATIENT)
Dept: UROLOGY | Facility: CLINIC | Age: 75
End: 2025-04-07
Payer: COMMERCIAL

## 2025-04-07 VITALS
BODY MASS INDEX: 34.37 KG/M2 | TEMPERATURE: 97.7 F | OXYGEN SATURATION: 99 % | SYSTOLIC BLOOD PRESSURE: 132 MMHG | HEART RATE: 78 BPM | WEIGHT: 219 LBS | HEIGHT: 67 IN | DIASTOLIC BLOOD PRESSURE: 84 MMHG

## 2025-04-07 DIAGNOSIS — R33.9 URINARY RETENTION: Primary | ICD-10-CM

## 2025-04-07 LAB — POST-VOID RESIDUAL VOLUME, ML POC: 22 ML

## 2025-04-07 PROCEDURE — 99024 POSTOP FOLLOW-UP VISIT: CPT

## 2025-04-07 PROCEDURE — 51798 US URINE CAPACITY MEASURE: CPT

## 2025-04-07 PROCEDURE — 88344 IMHCHEM/IMCYTCHM EA MLT ANTB: CPT | Performed by: PATHOLOGY

## 2025-04-07 PROCEDURE — 88305 TISSUE EXAM BY PATHOLOGIST: CPT | Performed by: PATHOLOGY

## 2025-04-07 NOTE — PROGRESS NOTES
"4/7/2025    Melchor Adrian  1950  [id number]    Diagnosis      Patient presents for TOV post HoLEP managed by Dr. Sanchez    Plan  Patient is to have gomez cath removed this morning in office and then return this afternoon for Post Void Residual    Procedure Gomez removal/voiding trial    Gomez catheter removed after deflation of an intact balloon. Patient tolerated well. Encouraged patient to hydrate well and return this afternoon for post void residual.  He knows he may return early if uncomfortable and unable to urinate. Patient agrees to this plan.    Patient returned this afternoon. Patient states able to void. Patient voided while in office. Bladder ultrasound performed and PVR measured 22 ml.    Recent Results (from the past 4 hours)   POCT Measure PVR    Collection Time: 04/07/25  2:20 PM   Result Value Ref Range    POST-VOID RESIDUAL VOLUME, ML POC 22 mL           Vitals:    04/07/25 0830   BP: 132/84   Patient Position: Sitting   Cuff Size: Standard   Pulse: 78   Temp: 97.7 °F (36.5 °C)   TempSrc: Temporal   SpO2: 99%   Weight: 99.3 kg (219 lb)   Height: 5' 7\" (1.702 m)           Norma Dumont LPN        "

## 2025-04-10 ENCOUNTER — OFFICE VISIT (OUTPATIENT)
Dept: URGENT CARE | Facility: CLINIC | Age: 75
End: 2025-04-10
Payer: COMMERCIAL

## 2025-04-10 VITALS
TEMPERATURE: 98 F | OXYGEN SATURATION: 96 % | WEIGHT: 217.25 LBS | HEART RATE: 109 BPM | RESPIRATION RATE: 18 BRPM | SYSTOLIC BLOOD PRESSURE: 122 MMHG | BODY MASS INDEX: 34.03 KG/M2 | DIASTOLIC BLOOD PRESSURE: 76 MMHG

## 2025-04-10 DIAGNOSIS — R05.3 PERSISTENT COUGH: ICD-10-CM

## 2025-04-10 DIAGNOSIS — J18.9 ATYPICAL PNEUMONIA: Primary | ICD-10-CM

## 2025-04-10 PROCEDURE — 99213 OFFICE O/P EST LOW 20 MIN: CPT | Performed by: PHYSICIAN ASSISTANT

## 2025-04-10 RX ORDER — AZITHROMYCIN 250 MG/1
TABLET, FILM COATED ORAL
Qty: 6 TABLET | Refills: 0 | Status: SHIPPED | OUTPATIENT
Start: 2025-04-10 | End: 2025-04-14

## 2025-04-10 NOTE — PROGRESS NOTES
[facility]        NAME: Melchor Adrian is a 74 y.o. male  : 1950    MRN: [id number]  DATE: April 10, 2025  TIME: 3:12 PM    Assessment and Plan   Atypical pneumonia [J18.9]  1. Atypical pneumonia  azithromycin (ZITHROMAX) 250 mg tablet      2. Persistent cough              Patient Instructions     Rx azithromycin, give 2 tablets today, then 1 tablet daily for days 2-5.  Motrin as necessary for fevers/discomfort.  Stay hydrated and get rest.  Follow up with PCP in 3-5 days.  ER if symptoms become severe.    If tests have been performed at [facility], our office will contact you with results if changes need to be made to the care plan discussed with you at the visit. [facility]     Chief Complaint     Chief Complaint   Patient presents with    Cough     Ongoing for 2 weeks. States it's causing incontinence. No fever or chills, no cold sx. Using throat lozenges and claritin           History of Present Illness       Patient is a 75 yo male who presents with a persistent cough for about 1 month. He was last seen by us on 3/7/25, for the same symptoms and tx with benzonatate capsules, which he says worked well while he was taking them. He denies fevers. Denies SOB/chest pain. No known sick contacts. Denies arm pain or diaphoresis.         Review of Systems   Review of Systems   Constitutional: Negative.    HENT: Negative.     Respiratory:  Positive for cough.    Cardiovascular: Negative.    Gastrointestinal: Negative.    Skin: Negative.    Neurological: Negative.          Current Medications       Current Outpatient Medications:     acetaminophen (TYLENOL) 325 mg tablet, Take 2 tablets (650 mg total) by mouth every 6 (six) hours as needed for mild pain, Disp: , Rfl:     amLODIPine (NORVASC) 5 mg tablet, Take 5 mg by mouth, Disp: , Rfl:     azithromycin (ZITHROMAX) 250 mg tablet, Take 2 tablets today then 1 tablet daily x 4 days, Disp: 6 tablet, Rfl: 0     clindamycin (CLEOCIN T) 1 % external solution, Apply topically 2 (two) times a day To face. Melchor Adrian,  1950, # [id number], Disp: 30 mL, Rfl: 2    docusate sodium (COLACE) 100 mg capsule, Take 1 capsule (100 mg total) by mouth 2 (two) times a day, Disp: 90 capsule, Rfl: 0    FLUoxetine 20 MG tablet, Take 20 mg by mouth daily, Disp: , Rfl:     levothyroxine (Synthroid) 25 mcg tablet, Take 37.5 mcg by mouth, Disp: , Rfl:     metroNIDAZOLE (METROGEL) 0.75 % gel, Apply topically 2 (two) times a day, Disp: 45 g, Rfl: 2    omeprazole (PriLOSEC) 40 MG capsule, Take 40 mg by mouth daily, Disp: , Rfl:     rosuvastatin (CRESTOR) 10 MG tablet, Take 5 mg by mouth daily, Disp: , Rfl:     triamcinolone (KENALOG) 0.1 % cream, Apply to affected spots twice a day for 2 weeks on the 1 week off., Disp: 80 g, Rfl: 1    finasteride (PROSCAR) 5 mg tablet, Take 1 tablet (5 mg total) by mouth daily, Disp: 90 tablet, Rfl: 1    tamsulosin (FLOMAX) 0.4 mg, Take 1 capsule (0.4 mg total) by mouth daily with dinner, Disp: 90 capsule, Rfl: 1    Current Allergies     Allergies as of 04/10/2025 - Reviewed 04/10/2025   Allergen Reaction Noted    Atorvastatin Other (See Comments) 10/16/2015    Simvastatin Other (See Comments) 01/15/2013            The following portions of the patient's history were reviewed and updated as appropriate: allergies, current medications, past family history, past medical history, past social history, past surgical history and problem list.     Past Medical History:   Diagnosis Date    Basal cell carcinoma     Left Upper back    GERD (gastroesophageal reflux disease)     Hyperlipidemia     Hypertension     PTSD (post-traumatic stress disorder)     SIRS (systemic inflammatory response syndrome) (HCC) 2024    Urinary retention 2024       Past Surgical History:   Procedure Laterality Date    CHOLECYSTECTOMY LAPAROSCOPIC N/A 2024    Procedure: CHOLECYSTECTOMY LAPAROSCOPIC;  Surgeon: Law Barragan  DO Vip[place];  Location: [facility];  Service: General    COLONOSCOPY      KNEE SURGERY      MASS EXCISION N/A 5/27/2020    Procedure: EXCISION BIOPSY TISSUE LESION/MASS RIGHT BUTTOCK;  Surgeon: Tez Erickson MD;  Location: [facility];  Service: General    NM LASER ENUCLEATION PROSTATE W/MORCELLATION N/A 4/1/2025    Procedure: LASER ENUCLEATION PROSTATE W MORCELLATION;  Surgeon: Dwight Sanchez DO;  Location[facility];  Service: Urology       Family History   Problem Relation Age of Onset    Heart disease Mother     Heart disease Father          Medications have been verified.        Objective   /76   Pulse (!) 109   Temp 98 °F (36.7 °C)   Resp 18   Wt 98.5 kg (217 lb 4 oz)   SpO2 96%   BMI 34.03 kg/m²        Physical Exam     Physical Exam  Vitals reviewed.   Constitutional:       Appearance: Normal appearance.   HENT:      Head: Normocephalic and atraumatic.      Right Ear: Tympanic membrane, ear canal and external ear normal.      Left Ear: Tympanic membrane, ear canal and external ear normal.      Nose: Nose normal.      Mouth/Throat:      Mouth: Mucous membranes are moist.      Pharynx: Oropharynx is clear.   Cardiovascular:      Rate and Rhythm: Normal rate and regular rhythm.      Pulses: Normal pulses.      Heart sounds: Normal heart sounds. No murmur heard.  Pulmonary:      Effort: Pulmonary effort is normal. No respiratory distress.      Breath sounds: Normal breath sounds. No wheezing.      Comments: + persistent dry cough  Musculoskeletal:      Cervical back: Normal range of motion and neck supple.   Lymphadenopathy:      Cervical: No cervical adenopathy.   Skin:     General: Skin is warm and dry.      Capillary Refill: Capillary refill takes less than 2 seconds.      Findings: No rash.   Neurological:      General: No focal deficit present.      Mental Status: He is alert and oriented to person, place, and time.

## 2025-04-10 NOTE — PATIENT INSTRUCTIONS
Rx azithromycin, give 2 tablets today, then 1 tablet daily for days 2-5.  Motrin as necessary for fevers/discomfort.  Stay hydrated and get rest.  Follow up with PCP in 3-5 days.  ER if symptoms become severe.

## 2025-04-11 ENCOUNTER — TELEPHONE (OUTPATIENT)
Dept: UROLOGY | Facility: CLINIC | Age: 75
End: 2025-04-11

## 2025-04-11 NOTE — TELEPHONE ENCOUNTER
This nurse attempted to reach patient to make him aware of scheduled 4 week appointment post HoLEP with Guera BETANCOURT on 4/29/25 10:20 am at the [place] office. Detailed message left providing office phone number.     If patient calls back please please confirm this appointment with him. Thank you!

## 2025-04-28 NOTE — ASSESSMENT & PLAN NOTE
- S/P HoLEP on 4/1/25 with Dr. Sanchez  - Passed TOV on 4/7/25   - PVR today 22 mL   - Surgical pathology - focus of adenocarcinoma present with extensive crush artifact. Due to artifact, a [place] grade and score cannot be assigned, and so patterns 4 or 5 cannot be excluded.   - PSA ordered. No other PSAs on file for review. .   Orders:    POCT Measure PVR

## 2025-04-28 NOTE — PROGRESS NOTES
Name: Melchor Adrian      : 1950      MRN: [id number]  Encounter Provider: Guera Fine PA-C  Encounter Date: 2025   Encounter department: [facility] [place]  :  Assessment & Plan  BPH with obstruction/lower urinary tract symptoms  - S/P HoLEP on 25 with Dr. Sanchez  - Passed TOV on 25   - PVR today 22 mL   - Surgical pathology - focus of adenocarcinoma present with extensive crush artifact. Due to artifact, a [place] grade and score cannot be assigned, and so patterns 4 or 5 cannot be excluded.   - PSA ordered. No other PSAs on file for review. .   Orders:    POCT Measure PVR    Prostate cancer (HCC)  - Small focus of adenocarcinoma on surgical path   - Obtain PSA prior to next visit which will be with Dr. Sanchez.   Orders:    PSA Total, Diagnostic; Future        History of Present Illness   Melchor Adrian is a 74 y.o. male who presents for follow up of BPH s/p HoLEP on 25. He had urinary retention prior to surgical requiring gomez catheter. Failed voiding trials multiple times despite dual therapy. He is doing well since surgery with excellent improvement in urinary stream. No dysuria or hematuria. Reports some residual pelvic pain and minor SENA that is improving.       Review of Systems   Constitutional:  Negative for chills and fever.   Respiratory:  Negative for shortness of breath.    Cardiovascular:  Negative for chest pain.   Gastrointestinal:  Negative for abdominal pain.   Genitourinary:  Negative for difficulty urinating, dysuria, flank pain, frequency, hematuria and urgency.   Neurological:  Negative for dizziness.        Objective   There were no vitals taken for this visit.    Physical Exam  Constitutional:       Appearance: Normal appearance.   HENT:      Head: Normocephalic and atraumatic.      Right Ear: External ear normal.      Left Ear: External ear normal.      Nose: Nose normal.   Eyes:      General: No scleral icterus.     Conjunctiva/sclera:  "Conjunctivae normal.   Cardiovascular:      Pulses: Normal pulses.   Pulmonary:      Effort: Pulmonary effort is normal.   Musculoskeletal:         General: Normal range of motion.      Cervical back: Normal range of motion.   Neurological:      General: No focal deficit present.      Mental Status: He is alert and oriented to person, place, and time.   Psychiatric:         Mood and Affect: Mood normal.         Behavior: Behavior normal.         Thought Content: Thought content normal.         Judgment: Judgment normal.         Results   No results found for: \"PSA\"  Lab Results   Component Value Date    GLUCOSE 100 04/01/2025    CALCIUM 7.0 (L) 04/01/2025    K 3.8 04/01/2025    CO2 21 04/01/2025     (H) 04/01/2025    BUN 19 04/01/2025    CREATININE 1.05 04/01/2025     Lab Results   Component Value Date    WBC 12.79 (H) 04/01/2025    HGB 13.4 04/01/2025    HCT 41.2 04/01/2025    MCV 82 04/01/2025     04/01/2025       Office Urine Dip  No results found for this or any previous visit (from the past hour).      "

## 2025-04-29 ENCOUNTER — OFFICE VISIT (OUTPATIENT)
Dept: UROLOGY | Facility: CLINIC | Age: 75
End: 2025-04-29
Payer: COMMERCIAL

## 2025-04-29 VITALS
HEART RATE: 75 BPM | TEMPERATURE: 96.4 F | HEIGHT: 67 IN | BODY MASS INDEX: 34.53 KG/M2 | DIASTOLIC BLOOD PRESSURE: 76 MMHG | OXYGEN SATURATION: 94 % | SYSTOLIC BLOOD PRESSURE: 120 MMHG | WEIGHT: 220 LBS

## 2025-04-29 DIAGNOSIS — C61 PROSTATE CANCER (HCC): ICD-10-CM

## 2025-04-29 DIAGNOSIS — N13.8 BPH WITH OBSTRUCTION/LOWER URINARY TRACT SYMPTOMS: Primary | ICD-10-CM

## 2025-04-29 DIAGNOSIS — N40.1 BPH WITH OBSTRUCTION/LOWER URINARY TRACT SYMPTOMS: Primary | ICD-10-CM

## 2025-04-29 LAB — POST-VOID RESIDUAL VOLUME, ML POC: 22 ML

## 2025-04-29 PROCEDURE — 99213 OFFICE O/P EST LOW 20 MIN: CPT | Performed by: PHYSICIAN ASSISTANT

## 2025-04-29 PROCEDURE — 51798 US URINE CAPACITY MEASURE: CPT | Performed by: PHYSICIAN ASSISTANT

## 2025-07-30 ENCOUNTER — TELEPHONE (OUTPATIENT)
Dept: UROLOGY | Facility: CLINIC | Age: 75
End: 2025-07-30

## 2025-07-31 ENCOUNTER — LAB (OUTPATIENT)
Dept: LAB | Facility: MEDICAL CENTER | Age: 75
End: 2025-07-31
Payer: COMMERCIAL

## 2025-07-31 DIAGNOSIS — C61 PROSTATE CANCER (HCC): ICD-10-CM

## 2025-07-31 LAB — PSA SERPL-MCNC: 0.5 NG/ML (ref 0–4)

## 2025-07-31 PROCEDURE — 84153 ASSAY OF PSA TOTAL: CPT

## 2025-07-31 PROCEDURE — 36415 COLL VENOUS BLD VENIPUNCTURE: CPT

## 2025-08-06 ENCOUNTER — OFFICE VISIT (OUTPATIENT)
Dept: UROLOGY | Facility: CLINIC | Age: 75
End: 2025-08-06
Payer: COMMERCIAL

## 2025-08-06 VITALS
OXYGEN SATURATION: 99 % | HEIGHT: 67 IN | HEART RATE: 70 BPM | WEIGHT: 224 LBS | TEMPERATURE: 97.8 F | DIASTOLIC BLOOD PRESSURE: 84 MMHG | BODY MASS INDEX: 35.16 KG/M2 | SYSTOLIC BLOOD PRESSURE: 136 MMHG

## 2025-08-06 DIAGNOSIS — N40.1 BPH WITH OBSTRUCTION/LOWER URINARY TRACT SYMPTOMS: Primary | ICD-10-CM

## 2025-08-06 DIAGNOSIS — N13.8 BPH WITH OBSTRUCTION/LOWER URINARY TRACT SYMPTOMS: Primary | ICD-10-CM

## 2025-08-06 LAB
POST-VOID RESIDUAL VOLUME, ML POC: 57 ML
SL AMB  POCT GLUCOSE, UA: NORMAL
SL AMB LEUKOCYTE ESTERASE,UA: NORMAL
SL AMB POCT BILIRUBIN,UA: NORMAL
SL AMB POCT BLOOD,UA: NORMAL
SL AMB POCT CLARITY,UA: NORMAL
SL AMB POCT COLOR,UA: YELLOW
SL AMB POCT KETONES,UA: NORMAL
SL AMB POCT NITRITE,UA: NORMAL
SL AMB POCT PH,UA: 5
SL AMB POCT SPECIFIC GRAVITY,UA: 1.02
SL AMB POCT URINE PROTEIN: NORMAL
SL AMB POCT UROBILINOGEN: 0.2

## 2025-08-06 PROCEDURE — 51798 US URINE CAPACITY MEASURE: CPT | Performed by: UROLOGY

## 2025-08-06 PROCEDURE — 81002 URINALYSIS NONAUTO W/O SCOPE: CPT | Performed by: UROLOGY

## 2025-08-06 PROCEDURE — 99213 OFFICE O/P EST LOW 20 MIN: CPT | Performed by: UROLOGY

## 2025-08-11 ENCOUNTER — OFFICE VISIT (OUTPATIENT)
Dept: URGENT CARE | Facility: CLINIC | Age: 75
End: 2025-08-11
Payer: COMMERCIAL

## (undated) DEVICE — PACK TUR

## (undated) DEVICE — POOLE SUCTION HANDLE: Brand: CARDINAL HEALTH

## (undated) DEVICE — 3M™ TEGADERM™ TRANSPARENT FILM DRESSING FRAME STYLE, 1624W, 2-3/8 IN X 2-3/4 IN (6 CM X 7 CM), 100/CT 4CT/CASE: Brand: 3M™ TEGADERM™

## (undated) DEVICE — LIGAMAX 5 MM ENDOSCOPIC MULTIPLE CLIP APPLIER: Brand: LIGAMAX

## (undated) DEVICE — SCD SEQUENTIAL COMPRESSION COMFORT SLEEVE MEDIUM KNEE LENGTH: Brand: KENDALL SCD

## (undated) DEVICE — SUT MONOCRYL 4-0 PS-2 18 IN Y496G

## (undated) DEVICE — GAUZE SPONGES,8 PLY: Brand: CURITY

## (undated) DEVICE — [HIGH FLOW INSUFFLATOR,  DO NOT USE IF PACKAGE IS DAMAGED,  KEEP DRY,  KEEP AWAY FROM SUNLIGHT,  PROTECT FROM HEAT AND RADIOACTIVE SOURCES.]: Brand: PNEUMOSURE

## (undated) DEVICE — INTENDED FOR TISSUE SEPARATION, AND OTHER PROCEDURES THAT REQUIRE A SHARP SURGICAL BLADE TO PUNCTURE OR CUT.: Brand: BARD-PARKER SAFETY BLADES SIZE 15, STERILE

## (undated) DEVICE — 3M™ STERI-STRIP™ REINFORCED ADHESIVE SKIN CLOSURES, R1546, 1/4 IN X 4 IN (6 MM X 100 MM), 10 STRIPS/ENVELOPE: Brand: 3M™ STERI-STRIP™

## (undated) DEVICE — SUT VICRYL 3-0 SH 27 IN J416H

## (undated) DEVICE — GAUZE SPONGES,USP TYPE VII GAUZE, 12 PLY: Brand: CURITY

## (undated) DEVICE — BAG URINE DRAINAGE 4000ML CONTINUOUS IRR

## (undated) DEVICE — ALLENTOWN LAP CHOLE APP PACK: Brand: CARDINAL HEALTH

## (undated) DEVICE — TUBING SMOKE EVAC W/FILTRATION DEVICE PLUMEPORT ACTIV

## (undated) DEVICE — TUBING SUCTION 5MM X 12 FT

## (undated) DEVICE — PREMIUM DRY TRAY LF: Brand: MEDLINE INDUSTRIES, INC.

## (undated) DEVICE — 4-PORT MANIFOLD: Brand: NEPTUNE 2

## (undated) DEVICE — LIGHT HANDLE COVER SLEEVE DISP BLUE STELLAR

## (undated) DEVICE — FIBER STD QUANTA 550 MICRON

## (undated) DEVICE — NEEDLE 25G X 1 1/2

## (undated) DEVICE — GUIDEWIRE STRGHT TIP 0.035 IN  SOLO PLUS

## (undated) DEVICE — TROCAR: Brand: KII® SLEEVE

## (undated) DEVICE — 3M™ STERI-STRIP™ REINFORCED ADHESIVE SKIN CLOSURES, R1547, 1/2 IN X 4 IN (12 MM X 100 MM), 6 STRIPS/ENVELOPE: Brand: 3M™ STERI-STRIP™

## (undated) DEVICE — GLOVE INDICATOR PI UNDERGLOVE SZ 7.5 BLUE

## (undated) DEVICE — GLOVE SRG BIOGEL ECLIPSE 7.5

## (undated) DEVICE — GLOVE INDICATOR PI UNDERGLOVE SZ 7 BLUE

## (undated) DEVICE — 2, DISPOSABLE SUCTION/IRRIGATOR WITHOUT DISPOSABLE TIP: Brand: STRYKEFLOW

## (undated) DEVICE — TISSUE RETRIEVAL SYSTEM: Brand: INZII RETRIEVAL SYSTEM

## (undated) DEVICE — 3M™ TEGADERM™ TRANSPARENT FILM DRESSING FRAME STYLE, 1626W, 4 IN X 4-3/4 IN (10 CM X 12 CM), 50/CT 4CT/CASE: Brand: 3M™ TEGADERM™

## (undated) DEVICE — PDS II VLT 0 107CM AG ST3: Brand: ENDOLOOP

## (undated) DEVICE — TROCAR: Brand: KII FIOS FIRST ENTRY

## (undated) DEVICE — TOWEL SURG XR DETECT GREEN STRL RFD

## (undated) DEVICE — HYDROPHILIC WOUND DRESSING WITH ZINC PLUS VITAMINS A AND B6.: Brand: DERMAGRAN®-B

## (undated) DEVICE — BETHLEHEM UNIVERSAL MINOR GEN: Brand: CARDINAL HEALTH

## (undated) DEVICE — CYSTO TUBING TUR Y IRRIGATION

## (undated) DEVICE — PAD GROUNDING DUAL ADULT

## (undated) DEVICE — PAD GROUNDING ADULT

## (undated) DEVICE — LUBRICANT JELLY SURGILUBE TUBE 4OZ FLIP TOP

## (undated) DEVICE — GLOVE SRG BIOGEL 7

## (undated) DEVICE — CHLORAPREP HI-LITE 26ML ORANGE

## (undated) DEVICE — CONTAINER TISSUE F/PIRANHA

## (undated) DEVICE — SUT VICRYL 4-0 PS-2 27 IN J426H

## (undated) DEVICE — CATH FOLEY COUDE HEMATURIA 22FR 30ML 3 WAY LUBRICATH

## (undated) DEVICE — ANTI-FOG SOLUTION WITH FOAM PAD: Brand: DEVON

## (undated) DEVICE — CHLORHEXIDINE 4PCT 4 OZ

## (undated) DEVICE — ROTATIONS-MORCELLATOR Ø 4.8MM WL 335MM  FOR MORCESCOPE, FOR MORCELLATION FOLLOWING LASER PROSTATE ENUCLEATION, STERILE: Brand: PIRANHA

## (undated) DEVICE — NEPTUNE E-SEP SMOKE EVACUATION PENCIL, COATED, 70MM BLADE, PUSH BUTTON SWITCH: Brand: NEPTUNE E-SEP

## (undated) DEVICE — SUT VICRYL 0 UR-6 27 IN J603H

## (undated) DEVICE — GLOVE SRG BIOGEL 7.5

## (undated) DEVICE — DRAPE EQUIPMENT RF WAND

## (undated) DEVICE — UROLOGIC DRAIN BAG: Brand: UNBRANDED

## (undated) DEVICE — ELECTRODE LAP L WIRE E-Z CLEAN 33CM -0100

## (undated) DEVICE — INVIEW CLEAR LEGGINGS: Brand: CONVERTORS

## (undated) DEVICE — METZENBAUM ADTEC SINGLE USE DISSECTING SCISSORS, SHAFT ONLY, MONOPOLAR, CURVED TO LEFT, WORKING LENGTH: 12 1/4", (310 MM), DIAM. 5 MM, INSULATED, DOUBLE ACTION, STERILE, DISPOSABLE, PACKAGE OF 10 PIECES: Brand: AESCULAP

## (undated) DEVICE — GAUZE SPONGES,16 PLY: Brand: CURITY